# Patient Record
Sex: FEMALE | Race: WHITE | NOT HISPANIC OR LATINO
[De-identification: names, ages, dates, MRNs, and addresses within clinical notes are randomized per-mention and may not be internally consistent; named-entity substitution may affect disease eponyms.]

---

## 2017-02-07 ENCOUNTER — APPOINTMENT (OUTPATIENT)
Dept: ORTHOPEDIC SURGERY | Facility: CLINIC | Age: 62
End: 2017-02-07

## 2017-02-07 VITALS
SYSTOLIC BLOOD PRESSURE: 142 MMHG | HEIGHT: 62 IN | WEIGHT: 250 LBS | BODY MASS INDEX: 46.01 KG/M2 | HEART RATE: 75 BPM | DIASTOLIC BLOOD PRESSURE: 80 MMHG

## 2017-02-07 DIAGNOSIS — M72.2 PLANTAR FASCIAL FIBROMATOSIS: ICD-10-CM

## 2017-02-07 DIAGNOSIS — M76.829 POSTERIOR TIBIAL TENDINITIS, UNSPECIFIED LEG: ICD-10-CM

## 2019-12-31 ENCOUNTER — APPOINTMENT (OUTPATIENT)
Dept: ORTHOPEDIC SURGERY | Facility: CLINIC | Age: 64
End: 2019-12-31
Payer: COMMERCIAL

## 2019-12-31 VITALS
HEIGHT: 62 IN | BODY MASS INDEX: 53.73 KG/M2 | SYSTOLIC BLOOD PRESSURE: 143 MMHG | WEIGHT: 292 LBS | HEART RATE: 78 BPM | DIASTOLIC BLOOD PRESSURE: 74 MMHG

## 2019-12-31 DIAGNOSIS — M16.12 UNILATERAL PRIMARY OSTEOARTHRITIS, LEFT HIP: ICD-10-CM

## 2019-12-31 DIAGNOSIS — M16.11 UNILATERAL PRIMARY OSTEOARTHRITIS, RIGHT HIP: ICD-10-CM

## 2019-12-31 DIAGNOSIS — I87.2 VENOUS INSUFFICIENCY (CHRONIC) (PERIPHERAL): ICD-10-CM

## 2019-12-31 DIAGNOSIS — M25.552 PAIN IN LEFT HIP: ICD-10-CM

## 2019-12-31 PROCEDURE — 73521 X-RAY EXAM HIPS BI 2 VIEWS: CPT

## 2019-12-31 PROCEDURE — 99214 OFFICE O/P EST MOD 30 MIN: CPT

## 2019-12-31 RX ORDER — CALCIUM CARBONATE/VITAMIN D3 600 MG-10
TABLET ORAL
Refills: 0 | Status: ACTIVE | COMMUNITY

## 2019-12-31 RX ORDER — AMLODIPINE BESYLATE 5 MG/1
TABLET ORAL
Refills: 0 | Status: ACTIVE | COMMUNITY

## 2019-12-31 RX ORDER — CHROMIUM 200 MCG
TABLET ORAL
Refills: 0 | Status: ACTIVE | COMMUNITY

## 2020-01-21 ENCOUNTER — OUTPATIENT (OUTPATIENT)
Dept: OUTPATIENT SERVICES | Facility: HOSPITAL | Age: 65
LOS: 1 days | Discharge: HOME | End: 2020-01-21
Payer: COMMERCIAL

## 2020-01-21 ENCOUNTER — OUTPATIENT (OUTPATIENT)
Dept: OUTPATIENT SERVICES | Facility: HOSPITAL | Age: 65
LOS: 1 days | End: 2020-01-21
Payer: COMMERCIAL

## 2020-01-21 VITALS
SYSTOLIC BLOOD PRESSURE: 130 MMHG | DIASTOLIC BLOOD PRESSURE: 70 MMHG | HEART RATE: 58 BPM | WEIGHT: 283.96 LBS | OXYGEN SATURATION: 99 % | RESPIRATION RATE: 14 BRPM | TEMPERATURE: 98 F | HEIGHT: 62 IN

## 2020-01-21 DIAGNOSIS — D24.2 BENIGN NEOPLASM OF LEFT BREAST: Chronic | ICD-10-CM

## 2020-01-21 DIAGNOSIS — Z98.890 OTHER SPECIFIED POSTPROCEDURAL STATES: Chronic | ICD-10-CM

## 2020-01-21 DIAGNOSIS — K11.6 MUCOCELE OF SALIVARY GLAND: Chronic | ICD-10-CM

## 2020-01-21 DIAGNOSIS — M19.90 UNSPECIFIED OSTEOARTHRITIS, UNSPECIFIED SITE: ICD-10-CM

## 2020-01-21 DIAGNOSIS — M16.12 UNILATERAL PRIMARY OSTEOARTHRITIS, LEFT HIP: ICD-10-CM

## 2020-01-21 DIAGNOSIS — Z90.710 ACQUIRED ABSENCE OF BOTH CERVIX AND UTERUS: Chronic | ICD-10-CM

## 2020-01-21 DIAGNOSIS — Z01.818 ENCOUNTER FOR OTHER PREPROCEDURAL EXAMINATION: ICD-10-CM

## 2020-01-21 DIAGNOSIS — S86.119A STRAIN OF OTHER MUSCLE(S) AND TENDON(S) OF POSTERIOR MUSCLE GROUP AT LOWER LEG LEVEL, UNSPECIFIED LEG, INITIAL ENCOUNTER: Chronic | ICD-10-CM

## 2020-01-21 LAB
ALBUMIN SERPL ELPH-MCNC: 3.8 G/DL — SIGNIFICANT CHANGE UP (ref 3.3–5)
ALP SERPL-CCNC: 125 U/L — HIGH (ref 30–120)
ALT FLD-CCNC: 85 U/L DA — HIGH (ref 10–60)
ANION GAP SERPL CALC-SCNC: 4 MMOL/L — LOW (ref 5–17)
APTT BLD: 32.4 SEC — SIGNIFICANT CHANGE UP (ref 28.5–37)
AST SERPL-CCNC: 38 U/L — SIGNIFICANT CHANGE UP (ref 10–40)
BILIRUB SERPL-MCNC: 0.6 MG/DL — SIGNIFICANT CHANGE UP (ref 0.2–1.2)
BLD GP AB SCN SERPL QL: SIGNIFICANT CHANGE UP
BUN SERPL-MCNC: 17 MG/DL — SIGNIFICANT CHANGE UP (ref 7–23)
CALCIUM SERPL-MCNC: 11 MG/DL — HIGH (ref 8.4–10.5)
CHLORIDE SERPL-SCNC: 105 MMOL/L — SIGNIFICANT CHANGE UP (ref 96–108)
CO2 SERPL-SCNC: 32 MMOL/L — HIGH (ref 22–31)
CREAT SERPL-MCNC: 0.75 MG/DL — SIGNIFICANT CHANGE UP (ref 0.5–1.3)
GLUCOSE SERPL-MCNC: 107 MG/DL — HIGH (ref 70–99)
HCT VFR BLD CALC: 42.3 % — SIGNIFICANT CHANGE UP (ref 34.5–45)
HGB BLD-MCNC: 14.1 G/DL — SIGNIFICANT CHANGE UP (ref 11.5–15.5)
INR BLD: 1.1 RATIO — SIGNIFICANT CHANGE UP (ref 0.88–1.16)
MCHC RBC-ENTMCNC: 29.8 PG — SIGNIFICANT CHANGE UP (ref 27–34)
MCHC RBC-ENTMCNC: 33.3 GM/DL — SIGNIFICANT CHANGE UP (ref 32–36)
MCV RBC AUTO: 89.4 FL — SIGNIFICANT CHANGE UP (ref 80–100)
MRSA PCR RESULT.: SIGNIFICANT CHANGE UP
NRBC # BLD: 0 /100 WBCS — SIGNIFICANT CHANGE UP (ref 0–0)
PLATELET # BLD AUTO: 366 K/UL — SIGNIFICANT CHANGE UP (ref 150–400)
POTASSIUM SERPL-MCNC: 4.1 MMOL/L — SIGNIFICANT CHANGE UP (ref 3.5–5.3)
POTASSIUM SERPL-SCNC: 4.1 MMOL/L — SIGNIFICANT CHANGE UP (ref 3.5–5.3)
PROT SERPL-MCNC: 7.6 G/DL — SIGNIFICANT CHANGE UP (ref 6–8.3)
PROTHROM AB SERPL-ACNC: 12.1 SEC — SIGNIFICANT CHANGE UP (ref 10–12.9)
RBC # BLD: 4.73 M/UL — SIGNIFICANT CHANGE UP (ref 3.8–5.2)
RBC # FLD: 13 % — SIGNIFICANT CHANGE UP (ref 10.3–14.5)
S AUREUS DNA NOSE QL NAA+PROBE: SIGNIFICANT CHANGE UP
SODIUM SERPL-SCNC: 141 MMOL/L — SIGNIFICANT CHANGE UP (ref 135–145)
WBC # BLD: 9.08 K/UL — SIGNIFICANT CHANGE UP (ref 3.8–10.5)
WBC # FLD AUTO: 9.08 K/UL — SIGNIFICANT CHANGE UP (ref 3.8–10.5)

## 2020-01-21 PROCEDURE — 77262 THER RADIOLOGY TX PLNG INTRM: CPT

## 2020-01-21 PROCEDURE — G0463: CPT

## 2020-01-21 RX ORDER — IRBESARTAN 75 MG/1
1 TABLET ORAL
Qty: 0 | Refills: 0 | DISCHARGE

## 2020-01-21 NOTE — H&P PST ADULT - HISTORY OF PRESENT ILLNESS
This is a 65 y/o female who presents with 5 year history of progressively worsening bilateral hip pain, clicking , snapping and difficulty ambulation worse in left hip . Reports constant pain and worse pain with walking , and activities pain scale 9/10 ,Ambulates with walker for the last t 2 weeks . MRI was done which showed severe OA in both hips . scheduled for left hip replacement anterior approach on 2/5/20

## 2020-01-21 NOTE — H&P PST ADULT - NSANTHOSAYNRD_GEN_A_CORE
No. JOSE RAUL screening performed.  STOP BANG Legend: 0-2 = LOW Risk; 3-4 = INTERMEDIATE Risk; 5-8 = HIGH Risk

## 2020-01-21 NOTE — H&P PST ADULT - NSICDXPASTSURGICALHX_GEN_ALL_CORE_FT
PAST SURGICAL HISTORY:  Adenoma of left breast excision 1997    Cyst of salivary gland excision 1989    History of pterygium excision left eye 2005    S/P ankle ligament repair left ankle 1985    S/P carpal tunnel release right 2009    S/P excision of neuroma left ankle 1988    S/P hysterectomy 2002    Status post trigger finger release right thumb and middle finger 2011    Tibialis posterior tendon rupture repair and foot surgery  left 2012

## 2020-01-21 NOTE — H&P PST ADULT - NSICDXPASTMEDICALHX_GEN_ALL_CORE_FT
PAST MEDICAL HISTORY:  Allergic rhinitis     Chronic venous insufficiency     GERD (gastroesophageal reflux disease)     Glaucoma     Hashimoto's thyroiditis     HTN (hypertension)     Hyperparathyroidism being monitored by endocrinologist    Migraines     Morbid obesity with BMI of 50.0-59.9, adult BMI 51.9    Osteoarthritis, hip, bilateral     Raynauds disease     Rosacea     Vertigo

## 2020-01-21 NOTE — H&P PST ADULT - DOCUMENT STATUS
Calorie Counts  Intake recorded for: 6/15                  Kcals: 0                       Protein: 0g  # Meals Recorded: no meal ordered from kitchen, no food intake recorded   # Supplements Recorded: none recorded      Authored by Resident/PA/NP

## 2020-01-21 NOTE — H&P PST ADULT - NSICDXFAMILYHX_GEN_ALL_CORE_FT
FAMILY HISTORY:  Family history of breast cancer in sister, HTN  Family history of colon cancer in father, prostate cancer , HTN  Family history of early CAD, CABG father  Family history of pheochromocytoma, sister  FH: HTN (hypertension), sisters  FH: HTN (hypertension), TIA; Mother

## 2020-01-21 NOTE — H&P PST ADULT - RS GEN PE MLT RESP DETAILS PC
respirations non-labored/no rhonchi/no subcutaneous emphysema/clear to auscultation bilaterally/no wheezes/no rales

## 2020-01-21 NOTE — H&P PST ADULT - NSICDXPROBLEM_GEN_ALL_CORE_FT
PROBLEM DIAGNOSES  Problem: Osteoarthritis  Assessment and Plan: left hip replacement   Medical , cardiac , vascular and endocrine clearance   Pre op instructions   Pharmacy consult

## 2020-01-22 ENCOUNTER — APPOINTMENT (OUTPATIENT)
Dept: RADIATION ONCOLOGY | Facility: CLINIC | Age: 65
End: 2020-01-22
Payer: COMMERCIAL

## 2020-01-22 PROBLEM — G43.909 MIGRAINE, UNSPECIFIED, NOT INTRACTABLE, WITHOUT STATUS MIGRAINOSUS: Chronic | Status: ACTIVE | Noted: 2020-01-21

## 2020-01-22 PROBLEM — I10 ESSENTIAL (PRIMARY) HYPERTENSION: Chronic | Status: ACTIVE | Noted: 2020-01-21

## 2020-01-22 PROBLEM — L71.9 ROSACEA, UNSPECIFIED: Chronic | Status: ACTIVE | Noted: 2020-01-21

## 2020-01-22 PROBLEM — J30.9 ALLERGIC RHINITIS, UNSPECIFIED: Chronic | Status: ACTIVE | Noted: 2020-01-21

## 2020-01-22 PROBLEM — I87.2 VENOUS INSUFFICIENCY (CHRONIC) (PERIPHERAL): Chronic | Status: ACTIVE | Noted: 2020-01-21

## 2020-01-22 PROBLEM — K21.9 GASTRO-ESOPHAGEAL REFLUX DISEASE WITHOUT ESOPHAGITIS: Chronic | Status: ACTIVE | Noted: 2020-01-21

## 2020-01-22 PROBLEM — E21.3 HYPERPARATHYROIDISM, UNSPECIFIED: Chronic | Status: ACTIVE | Noted: 2020-01-21

## 2020-01-22 PROBLEM — H40.9 UNSPECIFIED GLAUCOMA: Chronic | Status: ACTIVE | Noted: 2020-01-21

## 2020-01-22 PROBLEM — I73.00 RAYNAUD'S SYNDROME WITHOUT GANGRENE: Chronic | Status: ACTIVE | Noted: 2020-01-21

## 2020-01-22 PROBLEM — E06.3 AUTOIMMUNE THYROIDITIS: Chronic | Status: ACTIVE | Noted: 2020-01-21

## 2020-01-22 PROBLEM — R42 DIZZINESS AND GIDDINESS: Chronic | Status: ACTIVE | Noted: 2020-01-21

## 2020-01-22 PROCEDURE — 99202 OFFICE O/P NEW SF 15 MIN: CPT | Mod: 25

## 2020-01-22 NOTE — REASON FOR VISIT
[Consideration of Therapy for Benign Disease] : consideration of therapy for benign disease [Other: ___] : [unfilled]

## 2020-01-22 NOTE — PHYSICAL EXAM
[Obese] : obese [Normal] : normal heart rate and rhythm, normal S1 and S2, and no murmurs present [de-identified] : walker-dependent [de-identified] : unable to walk with indepenednet weight-bearing, uses 4-point walker- no muscle atrophy, good strength ,marked limitation ROM both hip/thighs

## 2020-01-22 NOTE — HISTORY OF PRESENT ILLNESS
[FreeTextEntry1] : Gretchen Hatfield is a  62 y/o Female who presents prior to planned left THR with Dr. Harris for worsening Bilateral buttock & groin hip pain with walking & daily activities, worsening over 2+ years.  The left hip is worse than the right with imaging demonstrating bone on bone arthritis. \par Imaging: Radiographs of the pelvis and [LEft] hip were performed today in the [place] office.\par Grade 4 bone-on-bone cartilaginous wear in the F- A joint.\par Subchondral sclerosis noted on both sides the F-A joint. AVN with superior collapse of the Left femoral head.\par Superior and inferior rim osteophytes are noted.\par The [ Left ] lesser trochanter aligns with the [ right ] on Shenton's line.\par There is disuse osteopenia of the [ Right ] proximal femur.\par \par MRI report Left Hip 12/17/19  Radiology:\par Advanced right and left hip osteoarthritis with bone-on-bone apposition, morbid joint effusion and synovitis, progressed since September 2019, avascular necrosis of the left femoral head, subchondral sclerosis and cysts.\par She has established severe DJD of both hips. Currently, Left side more painful than Right, 10/10 when standing. She had Work-up for Lower back & bilat. buttock pain with walking, limp, & ADLs, Had Spine W/U Raphael Geronimo MD including LSS MRI, had OA on X-Ray 2 Y ago. Presents with X-ray images & reports for Pelvis,Hip X-ray [facility] Radiology 9/17/2019. Also presents with Left hip MRI report and images  Radiology 12/17/19. She had course of bilat. hip steroid injections early Nov 2019 & 2nd course steroid injections both hips Z Radiology End Mod Nov 2019, both with min - no relief. Had short course OP PT 2017 & 2019 with no relief and unable to tolerate.\par \par Currently walking with cane for more than 15 yrs. Taking Advil 2x/day x 2 months. Had short course Tramadol from Spine MD ended Nov 2019.\par States had Rx NSAID 4 weeks for hip pain 2017 had urinary bleeding and stopped NSAID. Told no NSAID per Urologist at [facility]. Left hip replacement is scheduled for 2/5/2020.\par

## 2020-01-29 NOTE — PHARMACOTHERAPY INTERVENTION NOTE - COMMENTS
Presurgical evaluation for postoperative medication management. Team emailed. Note placed in [place].

## 2020-01-29 NOTE — PROGRESS NOTE ADULT - ASSESSMENT
Presurgical evaluation:  1.	IV TXA  2.	Note allergy to adhesive tape  3.	Acetaminophen for pain management. Please repeat LFTs POD1.  4.	Patient would like to take Celecoxib 100mg q12h x 2-3 weeks only for multimodal pain management.  5.	Patient receiving preop RT for HO prevention  6.	VTE prophylaxis per Caprini score  7.	Nutrition copied for food allergies

## 2020-01-29 NOTE — PROGRESS NOTE ADULT - SUBJECTIVE AND OBJECTIVE BOX
Admission medication reconciliation/Presurgical evaluation:    Allergies:  Cardizem: Rash  Levaquin: Rash  fish: Rash (halibut,sole, lobster)  adhesives: Rash  Flonase: face swelling  NSAIDs: hematuria with chronic use    Home Medications:   · 	Norvasc 5 mg once a day  · 	Toprol- mg 2 times a day AM&PM  · 	latanoprost 0.005% 1 drop to each affected eye once a day (in the evening)  · 	Pataday 0.2% 1 drop to each affected eye once a day, As Needed  · 	Synthroid 50 mcg once a day for 6  days   · 	Synthroid 100 mcg once a week 7th day   · 	Pepcid 20 mg once a day QPM  · 	ProAir HFA 90 mcg/inh 2 puffs inhaled 4 times a day, As Needed  · 	ZyrTEC 10 mg once a day  · 	Avapro 300 mg once a day    PAST MEDICAL HISTORY:  Allergic rhinitis   Chronic venous insufficiency   GERD (gastroesophageal reflux disease)   Glaucoma   Hashimoto's thyroiditis   HTN (hypertension)   Hyperparathyroidism being monitored by endocrinologist  Migraines   Morbid obesity with BMI of 50.0-59.9, adult BMI 51.9  Osteoarthritis, hip, bilateral   Raynauds disease   Rosacea   Vertigo     PAST SURGICAL HISTORY:  Adenoma of left breast excision 1997  Cyst of salivary gland excision 1989  History of pterygium excision left eye 2005  S/P ankle ligament repair left ankle 1985  S/P carpal tunnel release right 2009  S/P excision of neuroma left ankle 1988  S/P hysterectomy 2002  Status post trigger finger release right thumb and middle finger 2011  Tibialis posterior tendon rupture repair and foot surgery  left 2012    PST values of interest:  CO2 32 (­)  Ca 11.0 (­)  Alk phos 125 (­)  ALT 85 (­)  AST 38 (WNL)  SCr 0.75 mg/dL (WNL)

## 2020-02-04 PROCEDURE — 77300 RADIATION THERAPY DOSE PLAN: CPT | Mod: 26

## 2020-02-04 PROCEDURE — 77431 RADIATION THERAPY MANAGEMENT: CPT

## 2020-02-04 PROCEDURE — 77280 THER RAD SIMULAJ FIELD SMPL: CPT | Mod: 26

## 2020-02-05 ENCOUNTER — INPATIENT (INPATIENT)
Facility: HOSPITAL | Age: 65
LOS: 4 days | Discharge: HOME | DRG: 470 | End: 2020-02-10
Attending: ORTHOPAEDIC SURGERY | Admitting: ORTHOPAEDIC SURGERY
Payer: COMMERCIAL

## 2020-02-05 ENCOUNTER — APPOINTMENT (OUTPATIENT)
Dept: ORTHOPEDIC SURGERY | Facility: HOSPITAL | Age: 65
End: 2020-02-05

## 2020-02-05 ENCOUNTER — TRANSCRIPTION ENCOUNTER (OUTPATIENT)
Age: 65
End: 2020-02-05

## 2020-02-05 ENCOUNTER — RESULT REVIEW (OUTPATIENT)
Age: 65
End: 2020-02-05

## 2020-02-05 VITALS
SYSTOLIC BLOOD PRESSURE: 161 MMHG | TEMPERATURE: 99 F | RESPIRATION RATE: 18 BRPM | WEIGHT: 283.73 LBS | HEIGHT: 61.5 IN | HEART RATE: 76 BPM | OXYGEN SATURATION: 95 % | DIASTOLIC BLOOD PRESSURE: 78 MMHG

## 2020-02-05 DIAGNOSIS — S86.119A STRAIN OF OTHER MUSCLE(S) AND TENDON(S) OF POSTERIOR MUSCLE GROUP AT LOWER LEG LEVEL, UNSPECIFIED LEG, INITIAL ENCOUNTER: Chronic | ICD-10-CM

## 2020-02-05 DIAGNOSIS — Z98.890 OTHER SPECIFIED POSTPROCEDURAL STATES: Chronic | ICD-10-CM

## 2020-02-05 DIAGNOSIS — E66.01 MORBID (SEVERE) OBESITY DUE TO EXCESS CALORIES: ICD-10-CM

## 2020-02-05 DIAGNOSIS — Z90.710 ACQUIRED ABSENCE OF BOTH CERVIX AND UTERUS: Chronic | ICD-10-CM

## 2020-02-05 DIAGNOSIS — M16.12 UNILATERAL PRIMARY OSTEOARTHRITIS, LEFT HIP: ICD-10-CM

## 2020-02-05 DIAGNOSIS — D24.2 BENIGN NEOPLASM OF LEFT BREAST: Chronic | ICD-10-CM

## 2020-02-05 DIAGNOSIS — K11.6 MUCOCELE OF SALIVARY GLAND: Chronic | ICD-10-CM

## 2020-02-05 DIAGNOSIS — Z01.818 ENCOUNTER FOR OTHER PREPROCEDURAL EXAMINATION: ICD-10-CM

## 2020-02-05 LAB
ANION GAP SERPL CALC-SCNC: 7 MMOL/L — SIGNIFICANT CHANGE UP (ref 5–17)
BUN SERPL-MCNC: 13 MG/DL — SIGNIFICANT CHANGE UP (ref 7–23)
CALCIUM SERPL-MCNC: 10.5 MG/DL — SIGNIFICANT CHANGE UP (ref 8.4–10.5)
CHLORIDE SERPL-SCNC: 103 MMOL/L — SIGNIFICANT CHANGE UP (ref 96–108)
CO2 SERPL-SCNC: 28 MMOL/L — SIGNIFICANT CHANGE UP (ref 22–31)
CREAT SERPL-MCNC: 0.67 MG/DL — SIGNIFICANT CHANGE UP (ref 0.5–1.3)
GLUCOSE SERPL-MCNC: 159 MG/DL — HIGH (ref 70–99)
HCT VFR BLD CALC: 36.4 % — SIGNIFICANT CHANGE UP (ref 34.5–45)
HGB BLD-MCNC: 12.3 G/DL — SIGNIFICANT CHANGE UP (ref 11.5–15.5)
POTASSIUM SERPL-MCNC: 4.4 MMOL/L — SIGNIFICANT CHANGE UP (ref 3.5–5.3)
POTASSIUM SERPL-SCNC: 4.4 MMOL/L — SIGNIFICANT CHANGE UP (ref 3.5–5.3)
SODIUM SERPL-SCNC: 138 MMOL/L — SIGNIFICANT CHANGE UP (ref 135–145)

## 2020-02-05 PROCEDURE — 99223 1ST HOSP IP/OBS HIGH 75: CPT

## 2020-02-05 PROCEDURE — 88311 DECALCIFY TISSUE: CPT | Mod: 26

## 2020-02-05 PROCEDURE — 27130 TOTAL HIP ARTHROPLASTY: CPT | Mod: LT

## 2020-02-05 PROCEDURE — 88305 TISSUE EXAM BY PATHOLOGIST: CPT | Mod: 26

## 2020-02-05 RX ORDER — HYDROMORPHONE HYDROCHLORIDE 2 MG/ML
0.5 INJECTION INTRAMUSCULAR; INTRAVENOUS; SUBCUTANEOUS
Refills: 0 | Status: DISCONTINUED | OUTPATIENT
Start: 2020-02-05 | End: 2020-02-10

## 2020-02-05 RX ORDER — ALBUTEROL 90 UG/1
2 AEROSOL, METERED ORAL EVERY 4 HOURS
Refills: 0 | Status: DISCONTINUED | OUTPATIENT
Start: 2020-02-05 | End: 2020-02-10

## 2020-02-05 RX ORDER — TRANEXAMIC ACID 100 MG/ML
1000 INJECTION, SOLUTION INTRAVENOUS ONCE
Refills: 0 | Status: COMPLETED | OUTPATIENT
Start: 2020-02-05 | End: 2020-02-05

## 2020-02-05 RX ORDER — AMLODIPINE BESYLATE 2.5 MG/1
5 TABLET ORAL DAILY
Refills: 0 | Status: DISCONTINUED | OUTPATIENT
Start: 2020-02-07 | End: 2020-02-10

## 2020-02-05 RX ORDER — CELECOXIB 200 MG/1
1 CAPSULE ORAL
Qty: 60 | Refills: 0
Start: 2020-02-05 | End: 2020-03-05

## 2020-02-05 RX ORDER — PANTOPRAZOLE SODIUM 20 MG/1
1 TABLET, DELAYED RELEASE ORAL
Qty: 30 | Refills: 1
Start: 2020-02-05 | End: 2020-04-04

## 2020-02-05 RX ORDER — ACETAMINOPHEN 500 MG
1000 TABLET ORAL ONCE
Refills: 0 | Status: COMPLETED | OUTPATIENT
Start: 2020-02-05 | End: 2020-02-05

## 2020-02-05 RX ORDER — LEVOTHYROXINE SODIUM 125 MCG
50 TABLET ORAL
Refills: 0 | Status: DISCONTINUED | OUTPATIENT
Start: 2020-02-05 | End: 2020-02-10

## 2020-02-05 RX ORDER — LORATADINE 10 MG/1
10 TABLET ORAL DAILY
Refills: 0 | Status: DISCONTINUED | OUTPATIENT
Start: 2020-02-05 | End: 2020-02-10

## 2020-02-05 RX ORDER — APIXABAN 2.5 MG/1
2.5 TABLET, FILM COATED ORAL EVERY 12 HOURS
Refills: 0 | Status: DISCONTINUED | OUTPATIENT
Start: 2020-02-06 | End: 2020-02-10

## 2020-02-05 RX ORDER — OXYCODONE HYDROCHLORIDE 5 MG/1
10 TABLET ORAL
Refills: 0 | Status: DISCONTINUED | OUTPATIENT
Start: 2020-02-05 | End: 2020-02-10

## 2020-02-05 RX ORDER — SENNA PLUS 8.6 MG/1
2 TABLET ORAL AT BEDTIME
Refills: 0 | Status: DISCONTINUED | OUTPATIENT
Start: 2020-02-05 | End: 2020-02-10

## 2020-02-05 RX ORDER — CEFAZOLIN SODIUM 1 G
3000 VIAL (EA) INJECTION EVERY 8 HOURS
Refills: 0 | Status: COMPLETED | OUTPATIENT
Start: 2020-02-05 | End: 2020-02-06

## 2020-02-05 RX ORDER — ACETAMINOPHEN 500 MG
1000 TABLET ORAL EVERY 6 HOURS
Refills: 0 | Status: COMPLETED | OUTPATIENT
Start: 2020-02-05 | End: 2020-02-06

## 2020-02-05 RX ORDER — SODIUM CHLORIDE 9 MG/ML
1000 INJECTION, SOLUTION INTRAVENOUS
Refills: 0 | Status: DISCONTINUED | OUTPATIENT
Start: 2020-02-05 | End: 2020-02-05

## 2020-02-05 RX ORDER — APIXABAN 2.5 MG/1
1 TABLET, FILM COATED ORAL
Qty: 7 | Refills: 0
Start: 2020-02-05 | End: 2020-02-08

## 2020-02-05 RX ORDER — ACETAMINOPHEN 500 MG
1000 TABLET ORAL EVERY 8 HOURS
Refills: 0 | Status: DISCONTINUED | OUTPATIENT
Start: 2020-02-06 | End: 2020-02-10

## 2020-02-05 RX ORDER — POLYETHYLENE GLYCOL 3350 17 G/17G
17 POWDER, FOR SOLUTION ORAL
Qty: 0 | Refills: 0 | DISCHARGE
Start: 2020-02-05

## 2020-02-05 RX ORDER — LORATADINE 10 MG/1
1 TABLET ORAL
Qty: 0 | Refills: 0 | DISCHARGE
Start: 2020-02-05

## 2020-02-05 RX ORDER — ONDANSETRON 8 MG/1
4 TABLET, FILM COATED ORAL EVERY 6 HOURS
Refills: 0 | Status: DISCONTINUED | OUTPATIENT
Start: 2020-02-05 | End: 2020-02-10

## 2020-02-05 RX ORDER — APREPITANT 80 MG/1
40 CAPSULE ORAL ONCE
Refills: 0 | Status: COMPLETED | OUTPATIENT
Start: 2020-02-05 | End: 2020-02-05

## 2020-02-05 RX ORDER — CELECOXIB 200 MG/1
100 CAPSULE ORAL EVERY 12 HOURS
Refills: 0 | Status: DISCONTINUED | OUTPATIENT
Start: 2020-02-05 | End: 2020-02-10

## 2020-02-05 RX ORDER — LEVOTHYROXINE SODIUM 125 MCG
100 TABLET ORAL
Refills: 0 | Status: DISCONTINUED | OUTPATIENT
Start: 2020-02-05 | End: 2020-02-10

## 2020-02-05 RX ORDER — CHLORHEXIDINE GLUCONATE 213 G/1000ML
1 SOLUTION TOPICAL ONCE
Refills: 0 | Status: COMPLETED | OUTPATIENT
Start: 2020-02-05 | End: 2020-02-05

## 2020-02-05 RX ORDER — ONDANSETRON 8 MG/1
4 TABLET, FILM COATED ORAL ONCE
Refills: 0 | Status: COMPLETED | OUTPATIENT
Start: 2020-02-05 | End: 2020-02-05

## 2020-02-05 RX ORDER — ACETAMINOPHEN 500 MG
2 TABLET ORAL
Qty: 0 | Refills: 0 | DISCHARGE
Start: 2020-02-05 | End: 2020-02-19

## 2020-02-05 RX ORDER — MAGNESIUM HYDROXIDE 400 MG/1
30 TABLET, CHEWABLE ORAL DAILY
Refills: 0 | Status: DISCONTINUED | OUTPATIENT
Start: 2020-02-05 | End: 2020-02-10

## 2020-02-05 RX ORDER — SODIUM CHLORIDE 9 MG/ML
1000 INJECTION, SOLUTION INTRAVENOUS
Refills: 0 | Status: DISCONTINUED | OUTPATIENT
Start: 2020-02-05 | End: 2020-02-08

## 2020-02-05 RX ORDER — LATANOPROST 0.05 MG/ML
1 SOLUTION/ DROPS OPHTHALMIC; TOPICAL AT BEDTIME
Refills: 0 | Status: DISCONTINUED | OUTPATIENT
Start: 2020-02-05 | End: 2020-02-10

## 2020-02-05 RX ORDER — METOPROLOL TARTRATE 50 MG
100 TABLET ORAL EVERY 12 HOURS
Refills: 0 | Status: DISCONTINUED | OUTPATIENT
Start: 2020-02-05 | End: 2020-02-10

## 2020-02-05 RX ORDER — SENNA PLUS 8.6 MG/1
2 TABLET ORAL
Qty: 0 | Refills: 0 | DISCHARGE
Start: 2020-02-05

## 2020-02-05 RX ORDER — POLYETHYLENE GLYCOL 3350 17 G/17G
17 POWDER, FOR SOLUTION ORAL DAILY
Refills: 0 | Status: DISCONTINUED | OUTPATIENT
Start: 2020-02-05 | End: 2020-02-10

## 2020-02-05 RX ORDER — LOSARTAN POTASSIUM 100 MG/1
100 TABLET, FILM COATED ORAL DAILY
Refills: 0 | Status: DISCONTINUED | OUTPATIENT
Start: 2020-02-07 | End: 2020-02-10

## 2020-02-05 RX ORDER — HYDROMORPHONE HYDROCHLORIDE 2 MG/ML
0.5 INJECTION INTRAMUSCULAR; INTRAVENOUS; SUBCUTANEOUS
Refills: 0 | Status: DISCONTINUED | OUTPATIENT
Start: 2020-02-05 | End: 2020-02-05

## 2020-02-05 RX ORDER — CEFAZOLIN SODIUM 1 G
3000 VIAL (EA) INJECTION ONCE
Refills: 0 | Status: COMPLETED | OUTPATIENT
Start: 2020-02-05 | End: 2020-02-05

## 2020-02-05 RX ORDER — OLOPATADINE HYDROCHLORIDE 1 MG/ML
1 SOLUTION/ DROPS OPHTHALMIC
Qty: 0 | Refills: 0 | DISCHARGE

## 2020-02-05 RX ORDER — APIXABAN 2.5 MG/1
1 TABLET, FILM COATED ORAL
Qty: 60 | Refills: 0
Start: 2020-02-05 | End: 2020-03-05

## 2020-02-05 RX ORDER — PANTOPRAZOLE SODIUM 20 MG/1
40 TABLET, DELAYED RELEASE ORAL
Refills: 0 | Status: DISCONTINUED | OUTPATIENT
Start: 2020-02-05 | End: 2020-02-10

## 2020-02-05 RX ORDER — OXYCODONE HYDROCHLORIDE 5 MG/1
5 TABLET ORAL
Refills: 0 | Status: DISCONTINUED | OUTPATIENT
Start: 2020-02-05 | End: 2020-02-10

## 2020-02-05 RX ADMIN — HYDROMORPHONE HYDROCHLORIDE 0.5 MILLIGRAM(S): 2 INJECTION INTRAMUSCULAR; INTRAVENOUS; SUBCUTANEOUS at 13:00

## 2020-02-05 RX ADMIN — SENNA PLUS 2 TABLET(S): 8.6 TABLET ORAL at 21:21

## 2020-02-05 RX ADMIN — CELECOXIB 100 MILLIGRAM(S): 200 CAPSULE ORAL at 21:21

## 2020-02-05 RX ADMIN — HYDROMORPHONE HYDROCHLORIDE 0.5 MILLIGRAM(S): 2 INJECTION INTRAMUSCULAR; INTRAVENOUS; SUBCUTANEOUS at 14:59

## 2020-02-05 RX ADMIN — HYDROMORPHONE HYDROCHLORIDE 0.5 MILLIGRAM(S): 2 INJECTION INTRAMUSCULAR; INTRAVENOUS; SUBCUTANEOUS at 11:41

## 2020-02-05 RX ADMIN — APREPITANT 40 MILLIGRAM(S): 80 CAPSULE ORAL at 07:17

## 2020-02-05 RX ADMIN — Medication 400 MILLIGRAM(S): at 15:54

## 2020-02-05 RX ADMIN — HYDROMORPHONE HYDROCHLORIDE 0.5 MILLIGRAM(S): 2 INJECTION INTRAMUSCULAR; INTRAVENOUS; SUBCUTANEOUS at 15:15

## 2020-02-05 RX ADMIN — CHLORHEXIDINE GLUCONATE 1 APPLICATION(S): 213 SOLUTION TOPICAL at 07:16

## 2020-02-05 RX ADMIN — Medication 1000 MILLIGRAM(S): at 16:30

## 2020-02-05 RX ADMIN — HYDROMORPHONE HYDROCHLORIDE 0.5 MILLIGRAM(S): 2 INJECTION INTRAMUSCULAR; INTRAVENOUS; SUBCUTANEOUS at 13:15

## 2020-02-05 RX ADMIN — ONDANSETRON 4 MILLIGRAM(S): 8 TABLET, FILM COATED ORAL at 12:00

## 2020-02-05 RX ADMIN — Medication 1000 MILLIGRAM(S): at 21:32

## 2020-02-05 RX ADMIN — Medication 400 MILLIGRAM(S): at 21:21

## 2020-02-05 RX ADMIN — HYDROMORPHONE HYDROCHLORIDE 0.5 MILLIGRAM(S): 2 INJECTION INTRAMUSCULAR; INTRAVENOUS; SUBCUTANEOUS at 11:55

## 2020-02-05 RX ADMIN — Medication 200 MILLIGRAM(S): at 17:48

## 2020-02-05 RX ADMIN — SODIUM CHLORIDE 100 MILLILITER(S): 9 INJECTION, SOLUTION INTRAVENOUS at 11:30

## 2020-02-05 RX ADMIN — CELECOXIB 100 MILLIGRAM(S): 200 CAPSULE ORAL at 21:32

## 2020-02-05 RX ADMIN — SODIUM CHLORIDE 100 MILLILITER(S): 9 INJECTION, SOLUTION INTRAVENOUS at 15:54

## 2020-02-05 RX ADMIN — LATANOPROST 1 DROP(S): 0.05 SOLUTION/ DROPS OPHTHALMIC; TOPICAL at 21:22

## 2020-02-05 NOTE — BRIEF OPERATIVE NOTE - NSICDXBRIEFPOSTOP_GEN_ALL_CORE_FT
POST-OP DIAGNOSIS:  Primary localized osteoarthritis of left hip 05-Feb-2020 10:43:06  Gurjit Mccormick

## 2020-02-05 NOTE — DISCHARGE NOTE PROVIDER - INSTRUCTIONS
DASH diet  Pain medicine has been prescribed for you, as needed, and it often causes constipation.  Take docusate sodium (Colace) 100mg 3x daily, while taking narcotic pain medication.   For Constipation :   • Increase your water intake. Drink at least 8 glasses of water daily.  • Try adding fiber to your diet by eating fruits, vegetables and foods that are rich in grains.  • If you do experience constipation, you may take an over-the-counter laxative such as, Senokot, Miralax or  Milk of Magnesia.

## 2020-02-05 NOTE — OCCUPATIONAL THERAPY INITIAL EVALUATION ADULT - PERSONAL SAFETY AND JUDGMENT, REHAB EVAL
Obtained ABG and gave results to Dr. Cristi Barrios. No changes at this time. Will continue to monitor. intact

## 2020-02-05 NOTE — PROGRESS NOTE ADULT - SUBJECTIVE AND OBJECTIVE BOX
ORTHOPEDIC PA PROGRESS NOTE  HARIS NOEL      64y Female                                                                                                                               POD # 0  Post Op check    STATUS POST:               Pre-Op Dx: Primary localized osteoarthritis of left hip    Post-Op Dx:  Primary localized osteoarthritis of left hip    Procedure: Total hip arthroplasty: Left , Anterior                                                Pain (0-10): 4  Current Pain Management:  [ ] PCA   [ x] Po Analgesics [x ] IM /IV Anagesics   c/o left hip pain(just medicated by PACU nurse)  Denies SOB, Chest pain, N/V  T(F): 98.3  HR: 56  BP: 143/71  RR: 20  SpO2: 93%                          Physical Exam :    -   Dressing C/D/I.  NUBIA in place  -   Distal Neurvascular status intact grossly.   -   Warm well perfused; capillary refill <3 seconds   -   (+)EHL/FHL 5/5  -   (+) Sensation to light touch  -   (-) Calf tenderness Bilaterally    A/P: 64y Female s/p Primary localized osteoarthritis of left hip    -   Ortho Stable  -   Pain control   -   Medicine to follow  -   DVT ppx:     [x ]SCDs     [ ] ASA     [ ] Eliquis     [ ] Lovenox  -   Weight bearing status:  WBAT [x ]        PWB    [ ]     TTWB  [ ]      NWB  [ ]   -  Dispo:     Home [ ]     Acute Rehab [ ]     COURTNEY [ ]     TBD [x ]

## 2020-02-05 NOTE — DISCHARGE NOTE PROVIDER - NSDCCPTREATMENT_GEN_ALL_CORE_FT
PRINCIPAL PROCEDURE  Procedure: Total replacement of left hip joint by anterior approach  Findings and Treatment: Severe DJD left hip

## 2020-02-05 NOTE — BRIEF OPERATIVE NOTE - NSICDXBRIEFPREOP_GEN_ALL_CORE_FT
PRE-OP DIAGNOSIS:  Primary localized osteoarthritis of left hip 05-Feb-2020 10:42:48  Gurjit Mccormick

## 2020-02-05 NOTE — DISCHARGE NOTE PROVIDER - CARE PROVIDERS DIRECT ADDRESSES
,[email or web address]

## 2020-02-05 NOTE — DISCHARGE NOTE PROVIDER - NSDCMRMEDTOKEN_GEN_ALL_CORE_FT
Avapro 300 mg oral tablet: 1 tab(s) orally once a day  latanoprost 0.005% ophthalmic solution: 1 drop(s) to each affected eye once a day (in the evening)  Norvasc 5 mg oral tablet: 1 tab(s) orally once a day  Pepcid 20 mg oral tablet: orally once a day  ProAir HFA 90 mcg/inh inhalation aerosol: 2 puff(s) inhaled 4 times a day, As Needed  Synthroid 100 mcg (0.1 mg) oral tablet: 1 tab(s) orally once a week 7th day   Synthroid 50 mcg (0.05 mg) oral tablet: 1 tab(s) orally once a day for 6  days   Toprol- mg oral tablet, extended release: orally 2 times a day  ZyrTEC 10 mg oral tablet: 1 tab(s) orally once a day acetaminophen 500 mg oral tablet: 2 tab(s) orally every 8 hours  apixaban 2.5 mg oral tablet: 1 tab orally every 12 hours  apixaban 2.5 mg oral tablet: 1 tab orally every 12 hours  Avapro 300 mg oral tablet: 1 tab(s) orally once a day  celecoxib 100 mg oral capsule: 1 cap orally every 12 hours  latanoprost 0.005% ophthalmic solution: 1 drop(s) to each affected eye once a day (in the evening)  loratadine 10 mg oral tablet: 1 tab(s) orally once a day  Norvasc 5 mg oral tablet: 1 tab(s) orally once a day  pantoprazole 40 mg oral delayed release tablet: 1 tab(s) orally once a day (before a meal)  Pepcid 20 mg oral tablet: orally once a day  polyethylene glycol 3350 oral powder for reconstitution: 17 gram(s) orally once a day  ProAir HFA 90 mcg/inh inhalation aerosol: 2 puff(s) inhaled 4 times a day, As Needed  senna oral tablet: 2 tab(s) orally once a day (at bedtime)  Synthroid 100 mcg (0.1 mg) oral tablet: 1 tab(s) orally once a week 7th day   Synthroid 50 mcg (0.05 mg) oral tablet: 1 tab(s) orally once a day for 6  days   Toprol- mg oral tablet, extended release: orally 2 times a day  ZyrTEC 10 mg oral tablet: 1 tab(s) orally once a day 3:1 Commode: To assist with ADLs s/p left anterior total hip replacement  acetaminophen 500 mg oral tablet: 2 tab(s) orally every 8 hours  apixaban 2.5 mg oral tablet: 1 tab orally every 12 hours  apixaban 2.5 mg oral tablet: 1 tab orally every 12 hours  Avapro 300 mg oral tablet: 1 tab(s) orally once a day  celecoxib 100 mg oral capsule: 1 cap orally every 12 hours  latanoprost 0.005% ophthalmic solution: 1 drop(s) to each affected eye once a day (in the evening)  Norvasc 5 mg oral tablet: 1 tab(s) orally once a day  oxyCODONE 5 mg oral tablet: 1 tab(s) orally every 4 hours, As Needed -Mild Pain (1 - 3) MDD:6  pantoprazole 40 mg oral delayed release tablet: 1 tab(s) orally once a day (before a meal)  polyethylene glycol 3350 oral powder for reconstitution: 17 gram(s) orally once a day  ProAir HFA 90 mcg/inh inhalation aerosol: 2 puff(s) inhaled 4 times a day, As Needed  Rolling Walker with 5 inch Wheels: To assist with ambulation s/p left anterior total hip replacement  senna oral tablet: 2 tab(s) orally once a day (at bedtime)  Synthroid 100 mcg (0.1 mg) oral tablet: 1 tab(s) orally once a week 7th day   Synthroid 50 mcg (0.05 mg) oral tablet: 1 tab(s) orally once a day for 6  days   Toprol- mg oral tablet, extended release: orally 2 times a day  ZyrTEC 10 mg oral tablet: 1 tab(s) orally once a day 3:1 Commode: To assist with ADLs s/p left anterior total hip replacement  acetaminophen 500 mg oral tablet: 2 tab(s) orally every 8 hours  apixaban 2.5 mg oral tablet: 1 tab orally every 12 hours  apixaban 2.5 mg oral tablet: 1 tab orally every 12 hours  Avapro 300 mg oral tablet: 1 tab(s) orally once a day  celecoxib 100 mg oral capsule: 1 cap orally every 12 hours  latanoprost 0.005% ophthalmic solution: 1 drop(s) to each affected eye once a day (in the evening)  Norvasc 5 mg oral tablet: 1 tab(s) orally once a day  oxyCODONE 5 mg oral tablet: 1 tab(s) orally every 4 hours, As Needed -Mild Pain (1 - 3) MDD:6  pantoprazole 40 mg oral delayed release tablet: 1 tab(s) orally once a day (before a meal)  polyethylene glycol 3350 oral powder for reconstitution: 17 gram(s) orally once a day  ProAir HFA 90 mcg/inh inhalation aerosol: 2 puff(s) inhaled 4 times a day, As Needed  Rolling Walker with 5 inch Wheels: To assist with ambulation s/p left anterior total hip replacement  senna oral tablet: 2 tab(s) orally once a day (at bedtime)  sulfamethoxazole-trimethoprim 800 mg-160 mg oral tablet: 1 tab orally every 12 hours.  take with probiotics  Synthroid 100 mcg (0.1 mg) oral tablet: 1 tab(s) orally once a week 7th day   Synthroid 50 mcg (0.05 mg) oral tablet: 1 tab(s) orally once a day for 6  days   Toprol- mg oral tablet, extended release: orally 2 times a day  ZyrTEC 10 mg oral tablet: 1 tab(s) orally once a day

## 2020-02-05 NOTE — PHYSICAL THERAPY INITIAL EVALUATION ADULT - ADDITIONAL COMMENTS
Lives in house with adult children, 3 steps to enter no handrail. Will stay on 1st floor. Bed/bath with tub on 1st floor. Owns a RW and cane. Adult children to assist pt upon d/c.

## 2020-02-05 NOTE — DISCHARGE NOTE PROVIDER - NSDCHHNEEDSERVICE_GEN_ALL_CORE
Rehabilitation services/Medication teaching and assessment/Observation and assessment/Teaching and training/Wound care and assessment

## 2020-02-05 NOTE — DISCHARGE NOTE PROVIDER - NSDCFUSCHEDAPPT_GEN_ALL_CORE_FT
HARIS COHEN ; 02/21/2020 ; [address]  HARIS COHEN ; 04/14/2020 ; [facility] [address] [place] [address] HARIS COHEN ; 02/21/2020 ; [address]  HARIS COHEN ; 04/14/2020 ; [facility] [address] [place] [address] HARIS COHEN ; 02/21/2020 ; [address]  HARIS COHEN ; 04/14/2020 ; [facility] [address] [place] [address] HARIS COHEN ; 02/21/2020 ; [address]  HARIS COHEN ; 04/14/2020 ; [facility] Marty [address] [place] [address] HARIS COHEN ; 02/21/2020 ; [address]  HARIS COHEN ; 04/14/2020 ; \[facility]"" Marty [address] [place] [address] HARIS COHEN ; 02/21/2020 ; [address]  HARIS COHEN ; 04/14/2020 ; [facility] Marty [address] [place] [address] HARIS COHEN ; 02/21/2020 ; [address]  HARIS COHEN ; 04/14/2020 ; [facility] [address] [place] [address] HARIS COHEN ; 02/21/2020 ; [address]  HARIS COHEN ; 04/14/2020 ; \[facility]"" Marty [address] [place] [address] HARIS COHEN ; 02/21/2020 ; [address]  HARIS COHEN ; 04/14/2020 ; [facility] [address] [place] [address] HARIS COHEN ; 02/21/2020 ; [address]  HARIS COHEN ; 04/14/2020 ; [facility] Marty [address] [place] [address]

## 2020-02-05 NOTE — CONSULT NOTE ADULT - SUBJECTIVE AND OBJECTIVE BOX
Date/Time Patient Seen:  		  Referring MD:   Data Reviewed	       Patient is a 64y old  Female who presents with a chief complaint of left hip pain (05 Feb 2020 11:19)      Subjective/HPI    in bed  seen and examined  vs and meds reviewed    Pre-Op Diagnosis, Post-Op Diagnosis and Procedure:    Pre-Op, Post-Op and Procedure Selector:  ·  PRE-OP DIAGNOSIS:  Primary localized osteoarthritis of left hip 05-Feb-2020 10:42:48  Gurjit Mccormick  ·  POST-OP DIAGNOSIS:  Primary localized osteoarthritis of left hip 05-Feb-2020 10:43:06  Gurjit Mccormick  ·  PROCEDURES:  Total hip arthroplasty 05-Feb-2020 10:42:18 Left , Anterior Gurjit Mccormick    63yo F admitted s/p LEFT THR today.  Has had approx 5 years of progressively worsening b/l hip pain L>>R, accompanied by difficulty with ambulation requiring walker recently, constant pain, sharp, radiation into lower back, tried outpatient PT with minimal relief, tried NSAIDS with some relief but developed hematuria in 2017 and NSAIDS were stopped.  Pain is 0-4/10 at rest, up to 8/10 with activity.    SOCIAL HISTORY:  Lives: with sone  Smoking Hx: none  ETOH consumption: none  Illicit Drug Use:  None    Allergies    adhesives (Rash)  Cardizem (Rash)  fish (Rash)  Flonase (Swelling)  Levaquin (Rash)    · Primary Care Provider	Dr Alexandrea Eduardo    Dr Hernadez vascular   · Care Providers for Follow up (PCP/Outpatient Provider)	Dr Kaba  cardiologist, Dr Perlman ( endocrinologist) [phone],    Social History:  · Marital Status	  · Occupation	Dietician at [employer]  · Notes	son     Substance Use History:  · Substance Use	caffeine  · Caffeine Type	coffee  · Caffeine Amount/Frequency	1-2 cups/cans per day     Alcohol Use History:  · Have you ever consumed alcohol	never     Tobacco Usage:  · Tobacco Usage: Never smoker     Passive Smoke Exposure:  · Passive Smoke Exposure	No    Presurgical Screening:    Cardiovascular:  · Activity	limited due to pain ,Ambulates with  walker  · Energy expenditure (mets)	4 mets  · Symptoms	none     Cardiac Tests:  · Last Echocardiogram	1/2020  · Last Stress Test	2019 No ischemia      FAMILY HX - HTN ASHD OBESITY       PAST MEDICAL & SURGICAL HISTORY:  Morbid obesity with BMI of 50.0-59.9, adult: BMI 51.9  Chronic venous insufficiency  Glaucoma  Hyperparathyroidism: being monitored by endocrinologist  Rosacea  Migraines  Allergic rhinitis  GERD (gastroesophageal reflux disease)  Raynauds disease  Hashimoto's thyroiditis  Vertigo  Hypothyroidism  HTN (hypertension)  Osteoarthritis, hip, bilateral  Osteoarthritis: bila  History of pterygium excision: left eye 2005  Tibialis posterior tendon rupture: repair and foot surgery  left 2012  S/P hysterectomy: 2002  Status post trigger finger release: right thumb and middle finger 2011  S/P carpal tunnel release: right 2009  Cyst of salivary gland: excision 1989  Adenoma of left breast: excision 1997  S/P excision of neuroma: left ankle 1988  S/P ankle ligament repair: left ankle 1985        Medication list         MEDICATIONS  (STANDING):  acetaminophen  IVPB .. 1000 milliGRAM(s) IV Intermittent every 6 hours  lactated ringers. 1000 milliLiter(s) (100 mL/Hr) IV Continuous <Continuous>    MEDICATIONS  (PRN):  HYDROmorphone  Injectable 0.5 milliGRAM(s) IV Push every 10 minutes PRN Moderate Pain (4 - 6)         Vitals log        ICU Vital Signs Last 24 Hrs  T(C): 36.8 (05 Feb 2020 11:06), Max: 37.1 (05 Feb 2020 07:11)  T(F): 98.3 (05 Feb 2020 11:06), Max: 98.7 (05 Feb 2020 07:11)  HR: 55 (05 Feb 2020 13:30) (55 - 76)  BP: 125/54 (05 Feb 2020 13:30) (113/59 - 161/78)  BP(mean): --  ABP: --  ABP(mean): --  RR: 18 (05 Feb 2020 13:30) (16 - 22)  SpO2: 92% (05 Feb 2020 13:30) (92% - 96%)           Input and Output:  I&O's Detail    05 Feb 2020 07:01  -  05 Feb 2020 14:00  --------------------------------------------------------  IN:    lactated ringers.: 1200 mL  Total IN: 1200 mL    OUT:    Estimated Blood Loss: 250 mL  Total OUT: 250 mL    Total NET: 950 mL          Lab Data                  Review of Systems	  snores at night time -     Objective     Physical Examination    head at  heart s1s2  lung dc bs  abd dec BS  obese  head nc  cn grossly int      Pertinent Lab findings & Imaging      Deandra:  NO   Adequate UO     I&O's Detail    05 Feb 2020 07:01  -  05 Feb 2020 14:00  --------------------------------------------------------  IN:    lactated ringers.: 1200 mL  Total IN: 1200 mL    OUT:    Estimated Blood Loss: 250 mL  Total OUT: 250 mL    Total NET: 950 mL               Discussed with:     Cultures:	        Radiology          EXAM:  FLOURO IN O.R. 1HR [id number]                                  PROCEDURE DATE:  02/05/2020          INTERPRETATION:  Imaging guidance was provided by the Department of Radiology for a procedure performed by a physician from another clinical department. This study was performed and will be interpreted by that physician and therefore the department of radiology will not render an interpretation of these images.    This report was generated by an  in the department of radiology.                  DEPARTMENT RADIOLOGY   This document has been electronically signed. Feb 5 2020 11:11AM

## 2020-02-05 NOTE — OCCUPATIONAL THERAPY INITIAL EVALUATION ADULT - ADDITIONAL COMMENTS
Lives with adult children. Was using a RW/Cane past few weeks and ambulating short distances. Has 3 steps to enter. 13 inside. Tub with showerchair however states its broken. No toilet DME in place at this time

## 2020-02-05 NOTE — DISCHARGE NOTE PROVIDER - NSDCCPCAREPLAN_GEN_ALL_CORE_FT
PRINCIPAL DISCHARGE DIAGNOSIS  Diagnosis: Primary osteoarthritis of left hip  Assessment and Plan of Treatment: You have had an Anterior Total Hip Replacement. Follow instructions given to you by your surgeon.   PT/OT Total Hip Protocol; Ambulation, transfers, stairs, & ADLs  Full weight bearing both legs; Walker/cane use as instructed by PT/OT  Anterior THR precautions for 4 weeks: No straight leg raise; No external rotation of hip when extended-standing or lying flat; No hyperextension of hip when standing (kickback)  Ice packs to hip  Suture removal on/near after Post Op Day # 14.  See PCP in office approximately 2-3 weeks from discharge for exam/labwork. PRINCIPAL DISCHARGE DIAGNOSIS  Diagnosis: Primary osteoarthritis of left hip  Assessment and Plan of Treatment: You have had an Anterior Total Hip Replacement. Follow instructions given to you by your surgeon.   PT/OT Total Hip Protocol; Ambulation, transfers, stairs, & ADLs  Full weight bearing both legs; Walker/cane use as instructed by PT/OT  Anterior THR precautions for 4 weeks: No straight leg raise; No external rotation of hip when extended-standing or lying flat; No hyperextension of hip when standing (kickback)  Ice packs to hip  Suture removal on/near after Post Op Day # 14.  See PCP in office approximately 2-3 weeks from discharge for exam/labwork.  Keep NUBIA dressing clean/dry/intact.  You may shower with NUBIA dressing on, disconnect battery pack prior to going in the shower and reconnect after.    Keep NUBIA dressing on for a week, after approx one week battery will die, at this time you may remove NUBIA dressing.  If wound is still draining or if skin fold is causing incision to rub on itself you may recover with clean sterile dressing. yes

## 2020-02-05 NOTE — CONSULT NOTE ADULT - PROBLEM SELECTOR RECOMMENDATION 9
post op care  I sanchez  remote tele monitoring  julieta suspected - morbid obesity  will check TSH and VBG  empiric setting CPAP night time - 6 cm water pressure - nasal pillows mask -   discussed plan of care with patient   dvt p  I sanchez  pain regimen - caution with Opioids -   bowel regimen  will follow  plan of care discussed with RT and RN and Family and Patient

## 2020-02-05 NOTE — DISCHARGE NOTE PROVIDER - HOSPITAL COURSE
This patient was admitted to [facility] with a history of severe degenerative joint disease of the left hip.  Patient went to Pre-Surgical Testing at [facility] and was medically cleared to undergo elective procedure. Patient underwent Left Anterior THR by Dr. Mari Harris on 2/5/20. Procedure was well tolerated.  No operative or serge-operative complications arose during patients hospital course.  Patient received antibiotic according to SCIP guidelines for infection prevention.  Eliquis 2.5mg q 12h was given for DVT prophylaxis, in addition to the use of SCDs.  Anesthesia, Medical Hospitalist, Physical Therapy and Occupational Therapy were consulted. Patient is stable for discharge with a good prognosis.  Appropriate discharge instructions and medications are provided in this document. This patient was admitted to [facility] with a history of severe degenerative joint disease of the left hip.  Patient went to Pre-Surgical Testing at [facility] and was medically cleared to undergo elective procedure. Patient underwent Left Anterior THR by Dr. Mari Harris on 2/5/20. Procedure was well tolerated.  No operative or serge-operative complications arose during patients hospital course.  Patient received antibiotic according to SCIP guidelines for infection prevention.  Eliquis 2.5mg q 12h was given for DVT prophylaxis, in addition to the use of SCDs.  Anesthesia, Medical Hospitalist, Physical Therapy and Occupational Therapy were consulted. Patient is stable for discharge with a good prognosis.  Appropriate discharge instructions and medications are provided in this document.    possible JOSE RAUL , started on CPAP after SX. needs follow up with Dr Whatley pulmonalogist as outpatient for sleep study test. This patient was admitted to [facility] with a history of severe degenerative joint disease of the left hip.  Patient went to Pre-Surgical Testing at [facility] and was medically cleared to undergo elective procedure. Patient underwent Left Anterior THR by Dr. Mari Harris on 2/5/20. Procedure was well tolerated.  No operative or serge-operative complications arose during patients hospital course.  Patient received antibiotic according to SCIP guidelines for infection prevention.  Eliquis 2.5mg q 12h was given for DVT prophylaxis, in addition to the use of SCDs.  Anesthesia, Medical Hospitalist, Physical Therapy and Occupational Therapy were consulted. Patient with mild erythema and swelling left lower extremity on 2/10/20. She was therefore put on Bactrim prophylactically per Dr. Harris, because of prior h/o cellulitis.  Patient is stable for discharge with a good prognosis.  Appropriate discharge instructions and medications are provided in this document.    possible JOSE RAUL , started on CPAP after SX. needs follow up with Dr Whatley pulmonalogist as outpatient for sleep study test. This patient was admitted to [facility] with a history of severe degenerative joint disease of the left hip.  Patient went to Pre-Surgical Testing at [facility] and was medically cleared to undergo elective procedure. Patient underwent Left Anterior THR by Dr. Mari Harris on 2/5/20. Procedure was well tolerated.  No operative or serge-operative complications arose during patients hospital course.  Patient received antibiotic according to SCIP guidelines for infection prevention.  Eliquis 2.5mg q 12h was given for DVT prophylaxis, in addition to the use of SCDs.  Anesthesia, Medical Hospitalist, Physical Therapy and Occupational Therapy were consulted. Patient with mild erythema and swelling left lower extremity on 2/10/20. She was therefore put on Bactrim prophylactically per Dr. Harris, because of prior h/o cellulitis.  Patient is stable for discharge with a good prognosis.  Appropriate discharge instructions and medications are provided in this document.    possible JOSE RAUL , started on CPAP after SX.

## 2020-02-05 NOTE — CONSULT NOTE ADULT - SUBJECTIVE AND OBJECTIVE BOX
Information Obtained from:  EHR, Physical Chart, Patient at bedside (relevant EHR and Chart information verified with patient)    HPI:  65yo F admitted s/p LEFT THR today.  Has had approx 5 years of progressively worsening b/l hip pain L>>R, accompanied by difficulty with ambulation requiring walker recently, constant pain, sharp, radiation into lower back, tried outpatient PT with minimal relief, tried NSAIDS with some relief but developed hematuria in 2017 and NSAIDS were stopped.  Pain is 0-4/10 at rest, up to 8/10 with activity.       REVIEW OF SYSTEMS:  CONSTITUTIONAL: No fever, weight loss, or fatigue  EYES: No eye pain, visual disturbances, or discharge  ENMT:  No difficulty hearing, tinnitus, vertigo; No sinus or throat pain  NECK: No pain or stiffness  BREASTS: No pain, masses, or nipple discharge  RESPIRATORY: No cough, wheezing, chills or hemoptysis; No shortness of breath  CARDIOVASCULAR: No chest pain, palpitations, dizziness, or leg swelling  GASTROINTESTINAL: No abdominal or epigastric pain. No nausea, vomiting, or hematemesis; No diarrhea or constipation. No melena or hematochezia.  GENITOURINARY: No dysuria, frequency, hematuria, or incontinence  NEUROLOGICAL: No headaches, memory loss, loss of strength, numbness, or tremors  SKIN: No itching, burning, rashes, or lesions   LYMPH NODES: No enlarged glands  ENDOCRINE: No heat or cold intolerance; No hair loss  MUSCULOSKELETAL: No myalgias  PSYCHIATRIC: No depression, anxiety, mood swings, or difficulty sleeping  HEME/LYMPH: No easy bruising, or bleeding gums  ALLERGY AND IMMUNOLOGIC: No hives or eczema    PAST MEDICAL & SURGICAL HISTORY:  Morbid obesity with BMI of 50.0-59.9, adult: BMI 51.9  Chronic venous insufficiency  Glaucoma  Hyperparathyroidism: being monitored by endocrinologist  Rosacea  Migraines  Allergic rhinitis  GERD (gastroesophageal reflux disease)  Raynauds disease  Hashimoto's thyroiditis  Vertigo  HTN (hypertension)  Osteoarthritis, hip, bilateral  History of pterygium excision: left eye 2005  Tibialis posterior tendon rupture: repair and foot surgery  left 2012  S/P hysterectomy: 2002  Status post trigger finger release: right thumb and middle finger 2011  S/P carpal tunnel release: right 2009  Cyst of salivary gland: excision 1989  Adenoma of left breast: excision 1997  S/P excision of neuroma: left ankle 1988  S/P ankle ligament repair: left ankle 1985    SOCIAL HISTORY:  Lives: with sone  Smoking Hx: none  ETOH consumption: none  Illicit Drug Use:  None    Allergies    adhesives (Rash)  Cardizem (Rash)  fish (Rash)  Flonase (Swelling)  Levaquin (Rash)    Intolerances    Home Medications:  Avapro 300 mg oral tablet: 1 tab(s) orally once a day (05 Feb 2020 07:09)  latanoprost 0.005% ophthalmic solution: 1 drop(s) to each affected eye once a day (in the evening) (05 Feb 2020 07:09)  Norvasc 5 mg oral tablet: 1 tab(s) orally once a day (05 Feb 2020 07:09)  Pepcid 20 mg oral tablet: orally once a day (05 Feb 2020 07:09)  ProAir HFA 90 mcg/inh inhalation aerosol: 2 puff(s) inhaled 4 times a day, As Needed (05 Feb 2020 07:09)  Synthroid 100 mcg (0.1 mg) oral tablet: 1 tab(s) orally once a week 7th day  (05 Feb 2020 07:09)  Synthroid 50 mcg (0.05 mg) oral tablet: 1 tab(s) orally once a day for 6  days  (05 Feb 2020 07:09)  Toprol- mg oral tablet, extended release: orally 2 times a day (05 Feb 2020 07:09)  ZyrTEC 10 mg oral tablet: 1 tab(s) orally once a day (05 Feb 2020 07:09)    FAMILY HISTORY:  FH: HTN (hypertension): sisters  Family history of pheochromocytoma: sister  Family history of breast cancer in sister: HTN  FH: HTN (hypertension): TIA; Mother  Family history of colon cancer in father: prostate cancer , HTN  Family history of early CAD: CABG father    PHYSICAL EXAM:  Vital Signs Last 24 Hrs  T(C): 37.1 (05 Feb 2020 07:11), Max: 37.1 (05 Feb 2020 07:11)  T(F): 98.7 (05 Feb 2020 07:11), Max: 98.7 (05 Feb 2020 07:11)  HR: 76 (05 Feb 2020 07:11) (76 - 76)  BP: 161/78 (05 Feb 2020 07:11) (161/78 - 161/78)  BP(mean): --  RR: 18 (05 Feb 2020 07:11) (18 - 18)  SpO2: 95% (05 Feb 2020 07:11) (95% - 95%)    GENERAL: NAD, well-groomed, well-developed, awake, alert, oriented x 3, fluent and coherent speech  EYES: EOMI, PERRLA, conjunctiva and sclera clear  ENMT: No tonsillar erythema, exudates, or enlargement; Moist mucous membranes, Good dentition, No lesions  NECK: Supple, No JVD, No Cervical LAD, No thyromegaly, No thyroid nodules  NERVOUS SYSTEM:  Good concentration;  No facial droop  CHEST/LUNG: Clear to auscultation bilaterally; No rales, rhonchi, wheezing, or rubs  HEART: Regular rate and rhythm; No murmurs, rubs, or gallops  ABDOMEN: Soft, Nontender, Nondistended, Bowel sounds present, No palpable masses or organomegaly, No bruits  EXTREMITIES:  2+ Peripheral Pulses, No clubbing, cyanosis, or edema  INCISION:  Dressing clean/dry/intact       EKG (was personally reviewed): Information Obtained from:  EHR, Physical Chart, Patient at bedside (relevant EHR and Chart information verified with patient)    HPI:  65yo F admitted s/p LEFT THR today.  Has had approx 5 years of progressively worsening b/l hip pain L>>R, accompanied by difficulty with ambulation requiring walker recently, constant pain, sharp, radiation into lower back, tried outpatient PT with minimal relief, tried NSAIDS with some relief but developed hematuria in 2017 and NSAIDS were stopped.  Pain is 0-4/10 at rest, up to 8/10 with activity.       REVIEW OF SYSTEMS:  CONSTITUTIONAL: No fever, weight loss, or fatigue  EYES: No eye pain, visual disturbances, or discharge  ENMT:  No difficulty hearing, tinnitus, vertigo; No sinus or throat pain  NECK: No pain or stiffness  BREASTS: No pain, masses, or nipple discharge  RESPIRATORY: No cough, wheezing, chills or hemoptysis; No shortness of breath  CARDIOVASCULAR: No chest pain, palpitations, dizziness, or leg swelling  GASTROINTESTINAL: No abdominal or epigastric pain. No nausea, vomiting, or hematemesis; No diarrhea or constipation. No melena or hematochezia.  GENITOURINARY: No dysuria, frequency, hematuria, or incontinence  NEUROLOGICAL: No headaches, memory loss, loss of strength, numbness, or tremors  SKIN: No itching, burning, rashes, or lesions   LYMPH NODES: No enlarged glands  ENDOCRINE: No heat or cold intolerance; No hair loss  MUSCULOSKELETAL: No myalgias  PSYCHIATRIC: No depression, anxiety, mood swings, or difficulty sleeping  HEME/LYMPH: No easy bruising, or bleeding gums  ALLERGY AND IMMUNOLOGIC: No hives or eczema    PAST MEDICAL & SURGICAL HISTORY:  Morbid obesity with BMI of 50.0-59.9, adult: BMI 51.9  Chronic venous insufficiency  Glaucoma  Hyperparathyroidism: being monitored by endocrinologist  Rosacea  Migraines  Allergic rhinitis  GERD (gastroesophageal reflux disease)  Raynauds disease  Hashimoto's thyroiditis  Vertigo  HTN (hypertension)  Osteoarthritis, hip, bilateral  History of pterygium excision: left eye 2005  Tibialis posterior tendon rupture: repair and foot surgery  left 2012  S/P hysterectomy: 2002  Status post trigger finger release: right thumb and middle finger 2011  S/P carpal tunnel release: right 2009  Cyst of salivary gland: excision 1989  Adenoma of left breast: excision 1997  S/P excision of neuroma: left ankle 1988  S/P ankle ligament repair: left ankle 1985    SOCIAL HISTORY:  Lives: with sone  Smoking Hx: none  ETOH consumption: none  Illicit Drug Use:  None    Allergies    adhesives (Rash)  Cardizem (Rash)  fish (Rash)  Flonase (Swelling)  Levaquin (Rash)    Intolerances    Home Medications:  Avapro 300 mg oral tablet: 1 tab(s) orally once a day (05 Feb 2020 07:09)  latanoprost 0.005% ophthalmic solution: 1 drop(s) to each affected eye once a day (in the evening) (05 Feb 2020 07:09)  Norvasc 5 mg oral tablet: 1 tab(s) orally once a day (05 Feb 2020 07:09)  Pepcid 20 mg oral tablet: orally once a day (05 Feb 2020 07:09)  ProAir HFA 90 mcg/inh inhalation aerosol: 2 puff(s) inhaled 4 times a day, As Needed (05 Feb 2020 07:09)  Synthroid 100 mcg (0.1 mg) oral tablet: 1 tab(s) orally once a week 7th day  (05 Feb 2020 07:09)  Synthroid 50 mcg (0.05 mg) oral tablet: 1 tab(s) orally once a day for 6  days  (05 Feb 2020 07:09)  Toprol- mg oral tablet, extended release: orally 2 times a day (05 Feb 2020 07:09)  ZyrTEC 10 mg oral tablet: 1 tab(s) orally once a day (05 Feb 2020 07:09)    FAMILY HISTORY:  FH: HTN (hypertension): sisters  Family history of pheochromocytoma: sister  Family history of breast cancer in sister: HTN  FH: HTN (hypertension): TIA; Mother  Family history of colon cancer in father: prostate cancer , HTN  Family history of early CAD: CABG father    PHYSICAL EXAM:  Vital Signs Last 24 Hrs  T(C): 37.1 (05 Feb 2020 07:11), Max: 37.1 (05 Feb 2020 07:11)  T(F): 98.7 (05 Feb 2020 07:11), Max: 98.7 (05 Feb 2020 07:11)  HR: 76 (05 Feb 2020 07:11) (76 - 76)  BP: 161/78 (05 Feb 2020 07:11) (161/78 - 161/78)  BP(mean): --  RR: 18 (05 Feb 2020 07:11) (18 - 18)  SpO2: 95% (05 Feb 2020 07:11) (95% - 95%)    GENERAL: NAD, well-groomed, well-developed, awake, alert, oriented x 3, fluent and coherent speech  EYES: EOMI, PERRLA, conjunctiva and sclera clear  ENMT: No tonsillar erythema, exudates, or enlargement; Moist mucous membranes, Good dentition, No lesions  NECK: Supple, No JVD, No Cervical LAD, No thyromegaly, No thyroid nodules  NERVOUS SYSTEM:  Good concentration;  No facial droop  CHEST/LUNG: Clear to auscultation bilaterally; No rales, rhonchi, wheezing, or rubs  HEART: Regular rate and rhythm; No murmurs, rubs, or gallops  ABDOMEN: Soft, Nontender, obese, Bowel sounds present, No palpable masses or organomegaly, No bruits  EXTREMITIES:  2+ Peripheral Pulses, No clubbing, cyanosis, +1 edema b/l LE  INCISION:  Dressing clean/dry/intact       EKG (was personally reviewed):

## 2020-02-05 NOTE — PHYSICAL THERAPY INITIAL EVALUATION ADULT - GAIT TRAINING, PT EVAL
Pt will ambulate 150 feet with RW independently in 1-2 days . Negotiate 15 steps with 1 handrail and cane independently WBAT  LE in 1-2 days.

## 2020-02-05 NOTE — PHYSICAL THERAPY INITIAL EVALUATION ADULT - ACTIVE RANGE OF MOTION EXAMINATION, REHAB EVAL
Left Hip decreased due to sx. Left knee and ankle WNL/RLE Active ROM was WNL (within normal limits)/deficits as listed below/baron. upper extremity Active ROM was WNL (within normal limits)

## 2020-02-05 NOTE — DISCHARGE NOTE PROVIDER - NSDCFUADDAPPT_GEN_ALL_CORE_FT
It is advisable to follow up with your primary care provider within the next 2-3 weeks to ensure your medications are appropriate and there are no underlying problems after your procedure. 2/21/2020 @10:30  Mari Harris MD  [address] [facility],   [address]  [place] [address]  Tel: [phone]  Fax: [phone]     It is advisable to follow up with your primary care provider within the next 2-3 weeks to ensure your medications are appropriate and there are no underlying problems after your procedure.

## 2020-02-05 NOTE — DISCHARGE NOTE PROVIDER - NSDCACTIVITY_GEN_ALL_CORE
Walking - Outdoors allowed/Walking - Indoors allowed/No heavy lifting/straining/Stairs allowed/Do not drive or operate machinery

## 2020-02-05 NOTE — DISCHARGE NOTE PROVIDER - CARE PROVIDER_API CALL
Mari Harris)  Orthopedics  [address] [facility], [address]  [place] [address]  Phone: [phone]  Fax: [phone]  Established Patient  Scheduled Appointment: 02/21/2020 10:30 AM Mari Harris)  Orthopedics  [address] [facility] [address], [address]  [place] [address]  Phone: [phone]  Fax: [phone]  Established Patient  Scheduled Appointment: 02/21/2020 10:30 AM    Yandel Whatley)  Critical Care Medicine; Internal Medicine; Pulmonary Disease  [address] [place] [address]  Phone: [phone]  Fax: [phone]  Follow Up Time: Mari Harris)  Orthopedics  [address] [facility] [address]  [place] [address]  Phone: [phone]  Fax: [phone]  Established Patient  Scheduled Appointment: 02/21/2020 10:30 AM    Page Barroso)  Critical Care Medicine; Internal Medicine; Pulmonary Disease  [address] [facility], [address]  [place] [address]  Phone: [phone]  Fax: [phone]  Follow Up Time:

## 2020-02-05 NOTE — DISCHARGE NOTE PROVIDER - PROVIDER TOKENS
PROVIDER:[[id number]],SCHEDULEDAPPT:[02/21/2020],SCHEDULEDAPPTTIME:[10:30 AM],ESTABLISHEDPATIENT:[T]] PROVIDER:[[id number]],SCHEDULEDAPPT:[02/21/2020],SCHEDULEDAPPTTIME:[10:30 AM],ESTABLISHEDPATIENT:[T]],PROVIDER:[TOKEN:[[id number]]] PROVIDER:[TOKEN:[[id number]],SCHEDULEDAPPT:[02/21/2020],SCHEDULEDAPPTTIME:[10:30 AM],ESTABLISHEDPATIENT:[T]],PROVIDER:[TOKEN:[[id number]]]

## 2020-02-05 NOTE — CONSULT NOTE ADULT - ASSESSMENT
POD#0 s/p LEFT THR  - Pain control  - Bowel regimen  - PT/OT  - VTE PPx - Eliquis    HTN  -     Allergic Rhinitis  -     Glaucoma  - continue latanoprost    Hyperparathyroidism  - followed by Dr. Perlman - Endocrinologist  - explains mild elevation in Calcium on pre-op labs    Mild LFT Elev on preop labs  - repeat in AM POD#0 s/p LEFT THR  - Pain control  - Bowel regimen  - PT/OT  - VTE PPx - Eliquis    HTN  - continue toprol  - resume norvasc on POD#2  - resume ARB on POD#2    Allergic Rhinitis  - continue proair PRN  - can use loratadine instead of zyrtec    Glaucoma  - continue latanoprost    Hyperparathyroidism, subcentimeter thyroid nodules  - followed by Dr. Perlman - Endocrinologist  - explains mild elevation in Calcium on pre-op labs  - being managed conservatively for now    Mild LFT Elev on preop labs  - repeat in AM    Likely has undiagnosed JOSE RAUL  - reports loud snoring and unrestful sleep  - never tested for JOSE RAUL  - had desaturations down to 78% during surgery and in PACU  - monitor on tele  - Pulm Eval requested, may need CPAP starting tonight    B/L LE Edema  - cardiac clearance reviewed in chart  - echo showed normal EF, abnormal relaxation  - vascular clearance reviewed - has venous HTN in b/l legs, had LE dopplers on 1/20/20 which were neg for DVT

## 2020-02-06 ENCOUNTER — TRANSCRIPTION ENCOUNTER (OUTPATIENT)
Age: 65
End: 2020-02-06

## 2020-02-06 LAB
ALBUMIN SERPL ELPH-MCNC: 2.6 G/DL — LOW (ref 3.3–5)
ALP SERPL-CCNC: 80 U/L — SIGNIFICANT CHANGE UP (ref 30–120)
ALT FLD-CCNC: 44 U/L DA — SIGNIFICANT CHANGE UP (ref 10–60)
ANION GAP SERPL CALC-SCNC: 6 MMOL/L — SIGNIFICANT CHANGE UP (ref 5–17)
AST SERPL-CCNC: 21 U/L — SIGNIFICANT CHANGE UP (ref 10–40)
BASE EXCESS BLDV CALC-SCNC: 5.1 MMOL/L — HIGH (ref -2–2)
BILIRUB DIRECT SERPL-MCNC: 0.1 MG/DL — SIGNIFICANT CHANGE UP (ref 0–0.2)
BILIRUB INDIRECT FLD-MCNC: 0.4 MG/DL — SIGNIFICANT CHANGE UP (ref 0.2–1)
BILIRUB SERPL-MCNC: 0.5 MG/DL — SIGNIFICANT CHANGE UP (ref 0.2–1.2)
BUN SERPL-MCNC: 12 MG/DL — SIGNIFICANT CHANGE UP (ref 7–23)
CALCIUM SERPL-MCNC: 10.5 MG/DL — SIGNIFICANT CHANGE UP (ref 8.4–10.5)
CHLORIDE SERPL-SCNC: 104 MMOL/L — SIGNIFICANT CHANGE UP (ref 96–108)
CO2 SERPL-SCNC: 29 MMOL/L — SIGNIFICANT CHANGE UP (ref 22–31)
CREAT SERPL-MCNC: 0.65 MG/DL — SIGNIFICANT CHANGE UP (ref 0.5–1.3)
GAS PNL BLDV: SIGNIFICANT CHANGE UP
GLUCOSE SERPL-MCNC: 113 MG/DL — HIGH (ref 70–99)
HCO3 BLDV-SCNC: 28 MMOL/L — SIGNIFICANT CHANGE UP (ref 21–29)
HCT VFR BLD CALC: 33.7 % — LOW (ref 34.5–45)
HGB BLD-MCNC: 11 G/DL — LOW (ref 11.5–15.5)
HOROWITZ INDEX BLDV+IHG-RTO: 21 — SIGNIFICANT CHANGE UP
MCHC RBC-ENTMCNC: 29.8 PG — SIGNIFICANT CHANGE UP (ref 27–34)
MCHC RBC-ENTMCNC: 32.6 GM/DL — SIGNIFICANT CHANGE UP (ref 32–36)
MCV RBC AUTO: 91.3 FL — SIGNIFICANT CHANGE UP (ref 80–100)
NRBC # BLD: 0 /100 WBCS — SIGNIFICANT CHANGE UP (ref 0–0)
PCO2 BLDV: 53 MMHG — HIGH (ref 35–50)
PH BLDV: 7.37 — SIGNIFICANT CHANGE UP (ref 7.35–7.45)
PLATELET # BLD AUTO: 310 K/UL — SIGNIFICANT CHANGE UP (ref 150–400)
PO2 BLDV: 39 MMHG — SIGNIFICANT CHANGE UP (ref 25–45)
POTASSIUM SERPL-MCNC: 4.1 MMOL/L — SIGNIFICANT CHANGE UP (ref 3.5–5.3)
POTASSIUM SERPL-SCNC: 4.1 MMOL/L — SIGNIFICANT CHANGE UP (ref 3.5–5.3)
PROT SERPL-MCNC: 5.7 G/DL — LOW (ref 6–8.3)
RBC # BLD: 3.69 M/UL — LOW (ref 3.8–5.2)
RBC # FLD: 12.9 % — SIGNIFICANT CHANGE UP (ref 10.3–14.5)
SAO2 % BLDV: 70 % — SIGNIFICANT CHANGE UP (ref 67–88)
SODIUM SERPL-SCNC: 139 MMOL/L — SIGNIFICANT CHANGE UP (ref 135–145)
TSH SERPL-MCNC: 1.04 UIU/ML — SIGNIFICANT CHANGE UP (ref 0.27–4.2)
WBC # BLD: 11.97 K/UL — HIGH (ref 3.8–10.5)
WBC # FLD AUTO: 11.97 K/UL — HIGH (ref 3.8–10.5)

## 2020-02-06 PROCEDURE — 99233 SBSQ HOSP IP/OBS HIGH 50: CPT

## 2020-02-06 RX ADMIN — OXYCODONE HYDROCHLORIDE 5 MILLIGRAM(S): 5 TABLET ORAL at 21:21

## 2020-02-06 RX ADMIN — SODIUM CHLORIDE 100 MILLILITER(S): 9 INJECTION, SOLUTION INTRAVENOUS at 01:07

## 2020-02-06 RX ADMIN — OXYCODONE HYDROCHLORIDE 5 MILLIGRAM(S): 5 TABLET ORAL at 09:00

## 2020-02-06 RX ADMIN — OXYCODONE HYDROCHLORIDE 5 MILLIGRAM(S): 5 TABLET ORAL at 17:22

## 2020-02-06 RX ADMIN — OXYCODONE HYDROCHLORIDE 5 MILLIGRAM(S): 5 TABLET ORAL at 04:49

## 2020-02-06 RX ADMIN — Medication 400 MILLIGRAM(S): at 03:53

## 2020-02-06 RX ADMIN — SENNA PLUS 2 TABLET(S): 8.6 TABLET ORAL at 21:21

## 2020-02-06 RX ADMIN — Medication 1000 MILLIGRAM(S): at 08:25

## 2020-02-06 RX ADMIN — OXYCODONE HYDROCHLORIDE 5 MILLIGRAM(S): 5 TABLET ORAL at 08:24

## 2020-02-06 RX ADMIN — APIXABAN 2.5 MILLIGRAM(S): 2.5 TABLET, FILM COATED ORAL at 21:21

## 2020-02-06 RX ADMIN — OXYCODONE HYDROCHLORIDE 5 MILLIGRAM(S): 5 TABLET ORAL at 21:51

## 2020-02-06 RX ADMIN — Medication 1000 MILLIGRAM(S): at 17:20

## 2020-02-06 RX ADMIN — Medication 50 MICROGRAM(S): at 10:35

## 2020-02-06 RX ADMIN — OXYCODONE HYDROCHLORIDE 5 MILLIGRAM(S): 5 TABLET ORAL at 18:00

## 2020-02-06 RX ADMIN — CELECOXIB 100 MILLIGRAM(S): 200 CAPSULE ORAL at 08:25

## 2020-02-06 RX ADMIN — Medication 100 MILLIGRAM(S): at 05:36

## 2020-02-06 RX ADMIN — POLYETHYLENE GLYCOL 3350 17 GRAM(S): 17 POWDER, FOR SOLUTION ORAL at 08:28

## 2020-02-06 RX ADMIN — OXYCODONE HYDROCHLORIDE 5 MILLIGRAM(S): 5 TABLET ORAL at 04:19

## 2020-02-06 RX ADMIN — Medication 1000 MILLIGRAM(S): at 09:00

## 2020-02-06 RX ADMIN — OXYCODONE HYDROCHLORIDE 5 MILLIGRAM(S): 5 TABLET ORAL at 12:46

## 2020-02-06 RX ADMIN — OXYCODONE HYDROCHLORIDE 5 MILLIGRAM(S): 5 TABLET ORAL at 13:20

## 2020-02-06 RX ADMIN — Medication 1000 MILLIGRAM(S): at 04:00

## 2020-02-06 RX ADMIN — Medication 1000 MILLIGRAM(S): at 17:23

## 2020-02-06 RX ADMIN — CELECOXIB 100 MILLIGRAM(S): 200 CAPSULE ORAL at 22:00

## 2020-02-06 RX ADMIN — LATANOPROST 1 DROP(S): 0.05 SOLUTION/ DROPS OPHTHALMIC; TOPICAL at 21:21

## 2020-02-06 RX ADMIN — APIXABAN 2.5 MILLIGRAM(S): 2.5 TABLET, FILM COATED ORAL at 08:26

## 2020-02-06 RX ADMIN — CELECOXIB 100 MILLIGRAM(S): 200 CAPSULE ORAL at 21:21

## 2020-02-06 RX ADMIN — PANTOPRAZOLE SODIUM 40 MILLIGRAM(S): 20 TABLET, DELAYED RELEASE ORAL at 05:36

## 2020-02-06 RX ADMIN — CELECOXIB 100 MILLIGRAM(S): 200 CAPSULE ORAL at 08:27

## 2020-02-06 RX ADMIN — Medication 200 MILLIGRAM(S): at 01:06

## 2020-02-06 NOTE — PROGRESS NOTE ADULT - SUBJECTIVE AND OBJECTIVE BOX
INTERVAL HPI/OVERNIGHT EVENTS:   Patient seen and examined.  Tolerated CPAP overnight.  No fevers, chills, sweats, dizziness, HA, changes in vision, cp, palpitations, sob, persistent cough, n/v/d, abd pain, dysuria, focal weakness, or calf pain.   Eating, voiding, no BM yet.      REVIEW OF SYSTEMS:  See HPI,  all others negative    PHYSICAL EXAM:  Vital Signs Last 24 Hrs  T(C): 37.2 (2020 07:41), Max: 37.2 (2020 07:41)  T(F): 99 (2020 07:41), Max: 99 (2020 07:41)  HR: 54 (2020 07:41) (52 - 74)  BP: 137/65 (2020 07:41) (113/59 - 149/76)  BP(mean): --  RR: 17 (2020 07:41) (12 - 22)  SpO2: 92% (2020 07:41) (92% - 98%)    GENERAL: NAD, well-groomed, well-developed, awake, alert, oriented x 3, fluent and coherent speech  EYES: EOMI, PERRLA, conjunctiva and sclera clear  ENMT: No tonsillar erythema, exudates, or enlargement; Moist mucous membranes   NECK: Supple, No JVD, No Cervical LAD, No thyromegaly, No thyroid nodules felt  NERVOUS SYSTEM:  Good concentration; Moving all 4 extremities against gravity and resistance; No gross sensory deficits, No facial droop  CHEST/LUNG: Clear to auscultation bilaterally; No rales, rhonchi, wheezing, or rubs  HEART: Regular rate and rhythm; No murmurs, rubs, or gallops  ABDOMEN: Soft, Nontender, obese, Bowel sounds present, No palpable masses or organomegaly (though limited exam due to body habitus), No bruits  EXTREMITIES:  2+ Peripheral Pulses, No clubbing, cyanosis, trace b/l LE edema, no calf tenderness in either leg       Diagnostic Testin.0   11.97 )-----------( 310      ( 2020 07:22 )             33.7     2020 07:18    139    |  104    |  12     ----------------------------<  113    4.1     |  29     |  0.65     Ca    10.5       2020 07:18    TPro  5.7    /  Alb  2.6    /  TBili  0.5    /  DBili  0.1    /  AST  21     /  ALT  44     /  AlkPhos  80     2020 07:18

## 2020-02-06 NOTE — DISCHARGE NOTE NURSING/CASE MANAGEMENT/SOCIAL WORK - PATIENT PORTAL LINK FT
You can access the FollowMyHealth Patient Portal offered by [facility] by registering at the following website: [email or web address]. By joining [employer]’s FollowMyHealth portal, you will also be able to view your health information using other applications (apps) compatible with our system.

## 2020-02-06 NOTE — PROGRESS NOTE ADULT - SUBJECTIVE AND OBJECTIVE BOX
Date/Time Patient Seen:  		  Referring MD:   Data Reviewed	       Patient is a 64y old  Female who presents with a chief complaint of Left Anterior THR for severe left hip OA (05 Feb 2020 16:12)      Subjective/HPI     PAST MEDICAL & SURGICAL HISTORY:  Morbid obesity with BMI of 50.0-59.9, adult: BMI 51.9  Chronic venous insufficiency  Glaucoma  Hyperparathyroidism: being monitored by endocrinologist  Rosacea  Migraines  Allergic rhinitis  GERD (gastroesophageal reflux disease)  Raynauds disease  Hashimoto's thyroiditis  Vertigo  Hypothyroidism  HTN (hypertension)  Osteoarthritis, hip, bilateral  Osteoarthritis: bila  History of pterygium excision: left eye 2005  Tibialis posterior tendon rupture: repair and foot surgery  left 2012  S/P hysterectomy: 2002  Status post trigger finger release: right thumb and middle finger 2011  S/P carpal tunnel release: right 2009  Cyst of salivary gland: excision 1989  Adenoma of left breast: excision 1997  S/P excision of neuroma: left ankle 1988  S/P ankle ligament repair: left ankle 1985        Medication list         MEDICATIONS  (STANDING):  acetaminophen   Tablet .. 1000 milliGRAM(s) Oral every 8 hours  apixaban 2.5 milliGRAM(s) Oral every 12 hours  celecoxib 100 milliGRAM(s) Oral every 12 hours  lactated ringers. 1000 milliLiter(s) (100 mL/Hr) IV Continuous <Continuous>  latanoprost 0.005% Ophthalmic Solution 1 Drop(s) Both EYES at bedtime  levothyroxine 50 MICROGram(s) Oral <User Schedule>  levothyroxine 100 MICROGram(s) Oral <User Schedule>  loratadine 10 milliGRAM(s) Oral daily  metoprolol succinate  milliGRAM(s) Oral every 12 hours  pantoprazole    Tablet 40 milliGRAM(s) Oral before breakfast  polyethylene glycol 3350 17 Gram(s) Oral daily  senna 2 Tablet(s) Oral at bedtime    MEDICATIONS  (PRN):  ALBUTerol    90 MICROgram(s) HFA Inhaler 2 Puff(s) Inhalation every 4 hours PRN Shortness of Breath and/or Wheezing  aluminum hydroxide/magnesium hydroxide/simethicone Suspension 30 milliLiter(s) Oral four times a day PRN Indigestion  HYDROmorphone  Injectable 0.5 milliGRAM(s) IV Push every 3 hours PRN Severe Pain (7 - 10)  magnesium hydroxide Suspension 30 milliLiter(s) Oral daily PRN Constipation  ondansetron Injectable 4 milliGRAM(s) IV Push every 6 hours PRN Nausea and/or Vomiting  oxyCODONE    IR 5 milliGRAM(s) Oral every 3 hours PRN Mild Pain (1 - 3)  oxyCODONE    IR 10 milliGRAM(s) Oral every 3 hours PRN Moderate Pain (4 - 6)         Vitals log        ICU Vital Signs Last 24 Hrs  T(C): 37.2 (06 Feb 2020 07:41), Max: 37.2 (06 Feb 2020 07:41)  T(F): 99 (06 Feb 2020 07:41), Max: 99 (06 Feb 2020 07:41)  HR: 54 (06 Feb 2020 07:41) (52 - 74)  BP: 137/65 (06 Feb 2020 07:41) (113/59 - 149/76)  BP(mean): --  ABP: --  ABP(mean): --  RR: 17 (06 Feb 2020 07:41) (12 - 22)  SpO2: 92% (06 Feb 2020 07:41) (92% - 98%)           Input and Output:  I&O's Detail    05 Feb 2020 07:01  -  06 Feb 2020 07:00  --------------------------------------------------------  IN:    IV PiggyBack: 450 mL    lactated ringers.: 1550 mL    lactated ringers.: 1400 mL    Oral Fluid: 100 mL  Total IN: 3500 mL    OUT:    Estimated Blood Loss: 250 mL    Voided: 700 mL  Total OUT: 950 mL    Total NET: 2550 mL          Lab Data                        11.0   11.97 )-----------( 310      ( 06 Feb 2020 07:22 )             33.7     02-05    138  |  103  |  13  ----------------------------<  159<H>  4.4   |  28  |  0.67    Ca    10.5      05 Feb 2020 18:34              Review of Systems	      Objective     Physical Examination    head at  heart s1s2  lung dc BS  abd soft  head nc      Pertinent Lab findings & Imaging      Deandra:  NO   Adequate UO     I&O's Detail    05 Feb 2020 07:01  -  06 Feb 2020 07:00  --------------------------------------------------------  IN:    IV PiggyBack: 450 mL    lactated ringers.: 1550 mL    lactated ringers.: 1400 mL    Oral Fluid: 100 mL  Total IN: 3500 mL    OUT:    Estimated Blood Loss: 250 mL    Voided: 700 mL  Total OUT: 950 mL    Total NET: 2550 mL               Discussed with:     Cultures:	        Radiology

## 2020-02-06 NOTE — DISCHARGE NOTE NURSING/CASE MANAGEMENT/SOCIAL WORK - NSDCFUADDAPPT_GEN_ALL_CORE_FT
It is advisable to follow up with your primary care provider within the next 2-3 weeks to ensure your medications are appropriate and there are no underlying problems after your procedure.

## 2020-02-06 NOTE — DISCHARGE NOTE NURSING/CASE MANAGEMENT/SOCIAL WORK - NSSCNAMETXT_GEN_ALL_CORE
[facility]   Please contact the home care agency if you have not heard from them by 12 noon on the day after your hospital discharge.   Nurse to visit the day after hospital discharge; Rehabilitation Therapists to follow

## 2020-02-06 NOTE — PROGRESS NOTE ADULT - PROBLEM SELECTOR PLAN 1
obesity  HTN  post op  OP  OA  JOSE RAUL - by features and clinical history -  tolerated CPAP last night - empiric settings - and nasal mask  cvs rx regimen and BP control  DVT p  I sanchez  PT assessment  wound care  skin care  out of bed with assist  sleep hygiene and weight management discussed  will need [facility] with [facility] - either in [place] or [place]  discussed with pt and her sister

## 2020-02-06 NOTE — PROGRESS NOTE ADULT - ASSESSMENT
POD#1 s/p LEFT THR  - Pain control  - Bowel regimen  - PT/OT  - VTE PPx - Eliquis    HTN  - continue toprol  - resume norvasc on POD#2  - resume ARB on POD#2    Allergic Rhinitis  - continue proair PRN  - can use loratadine instead of zyrtec    Glaucoma  - continue latanoprost    Hyperparathyroidism, subcentimeter thyroid nodules  - followed by Dr. Perlman - Endocrinologist  - explains mild elevation in Calcium on pre-op labs  - being managed conservatively for now    Mild LFT Elev on preop labs  - stable post-op  - no need to trend further    Likely has undiagnosed JOSE RAUL  - reports loud snoring and unrestful sleep  - never tested for JOSE RAUL  - had desaturations down to 78% during surgery and in PACU  - monitor on tele  - Pulm Eval appreciated, patient placed on CPAP overnight  - Patient understand importance of f/u for PSG and risks of untreated JOSE RAUL    B/L LE Edema  - cardiac clearance reviewed in chart  - echo showed normal EF, abnormal relaxation  - vascular clearance reviewed - has venous HTN in b/l legs, had LE dopplers on 1/20/20 which were neg for DVT

## 2020-02-06 NOTE — PROGRESS NOTE ADULT - REASON FOR ADMISSION
Patient is a 64y old  Female who presents with a chief complaint of Left Anterior THR for severe left hip OA (05 Feb 2020 16:12)

## 2020-02-06 NOTE — PROGRESS NOTE ADULT - SUBJECTIVE AND OBJECTIVE BOX
ORTHOPEDIC PA PROGRESS NOTE  HARIS NOEL      64y Female                                                                                                                               POD #1        STATUS POST:               Pre-Op Dx: Primary localized osteoarthritis of left hip    Post-Op Dx:  Primary localized osteoarthritis of left hip    Procedure: Total hip arthroplasty: Left , Anterior                                              Patient comfortable pain controlled.  Voiding urine passing gas and tolerating p.o diet.  No complaints  Pain (0-10):   Current Pain Management:  [ ] PCA   [ x] Po Analgesics [ ] IM /IV Anagesics     T(F): 97.9  HR: 64  BP: 120/69  RR: 17  SpO2: 93%                        11.0   11.97 )-----------( 310      ( 06 Feb 2020 07:22 )             33.7                     02-05    138  |  103  |  13  ----------------------------<  159<H>  4.4   |  28  |  0.67    Ca    10.5      05 Feb 2020 18:34      Physical Exam :    -  Kassie dressing in place minimal strike through at distal portion.     -   Distal Neurvascular status intact grossly.   -   Warm well perfused; capillary refill <3 seconds   -   (+)EHL/FHL 5/5 dorsi/plantar flexion intact  -   (+) Sensation to light touch  -   (-) Calf tenderness Bilaterally    A/P: 64y Female s/p Primary localized osteoarthritis of left hip    -   Ortho Stable  -   Pain control   -   Medicine to follow  -   DVT ppx:     [x ]SCDs     [ ] ASA     [x ] Eliquis     [ ] Lovenox  -   Weight bearing status:  WBAT [x ]        PWB    [ ]     TTWB  [ ]      NWB  [ ]   -  Dispo:     Home [ x]     Acute Rehab [ ]     COURTNEY [ ]     TBD [ ]

## 2020-02-07 LAB
ANION GAP SERPL CALC-SCNC: 5 MMOL/L — SIGNIFICANT CHANGE UP (ref 5–17)
BUN SERPL-MCNC: 10 MG/DL — SIGNIFICANT CHANGE UP (ref 7–23)
CALCIUM SERPL-MCNC: 10 MG/DL — SIGNIFICANT CHANGE UP (ref 8.4–10.5)
CHLORIDE SERPL-SCNC: 103 MMOL/L — SIGNIFICANT CHANGE UP (ref 96–108)
CO2 SERPL-SCNC: 31 MMOL/L — SIGNIFICANT CHANGE UP (ref 22–31)
CREAT SERPL-MCNC: 0.58 MG/DL — SIGNIFICANT CHANGE UP (ref 0.5–1.3)
GLUCOSE SERPL-MCNC: 97 MG/DL — SIGNIFICANT CHANGE UP (ref 70–99)
HCT VFR BLD CALC: 31.9 % — LOW (ref 34.5–45)
HGB BLD-MCNC: 10.6 G/DL — LOW (ref 11.5–15.5)
MCHC RBC-ENTMCNC: 30.1 PG — SIGNIFICANT CHANGE UP (ref 27–34)
MCHC RBC-ENTMCNC: 33.2 GM/DL — SIGNIFICANT CHANGE UP (ref 32–36)
MCV RBC AUTO: 90.6 FL — SIGNIFICANT CHANGE UP (ref 80–100)
NRBC # BLD: 0 /100 WBCS — SIGNIFICANT CHANGE UP (ref 0–0)
PLATELET # BLD AUTO: 296 K/UL — SIGNIFICANT CHANGE UP (ref 150–400)
POTASSIUM SERPL-MCNC: 4.2 MMOL/L — SIGNIFICANT CHANGE UP (ref 3.5–5.3)
POTASSIUM SERPL-SCNC: 4.2 MMOL/L — SIGNIFICANT CHANGE UP (ref 3.5–5.3)
RBC # BLD: 3.52 M/UL — LOW (ref 3.8–5.2)
RBC # FLD: 13 % — SIGNIFICANT CHANGE UP (ref 10.3–14.5)
SODIUM SERPL-SCNC: 139 MMOL/L — SIGNIFICANT CHANGE UP (ref 135–145)
WBC # BLD: 9.7 K/UL — SIGNIFICANT CHANGE UP (ref 3.8–10.5)
WBC # FLD AUTO: 9.7 K/UL — SIGNIFICANT CHANGE UP (ref 3.8–10.5)

## 2020-02-07 PROCEDURE — 93970 EXTREMITY STUDY: CPT | Mod: 26

## 2020-02-07 PROCEDURE — 99233 SBSQ HOSP IP/OBS HIGH 50: CPT

## 2020-02-07 RX ORDER — FAMOTIDINE 10 MG/ML
0 INJECTION INTRAVENOUS
Qty: 0 | Refills: 0 | DISCHARGE

## 2020-02-07 RX ORDER — OXYCODONE HYDROCHLORIDE 5 MG/1
1 TABLET ORAL
Qty: 42 | Refills: 0
Start: 2020-02-07 | End: 2020-02-13

## 2020-02-07 RX ADMIN — CELECOXIB 100 MILLIGRAM(S): 200 CAPSULE ORAL at 08:42

## 2020-02-07 RX ADMIN — Medication 50 MICROGRAM(S): at 06:43

## 2020-02-07 RX ADMIN — Medication 1000 MILLIGRAM(S): at 08:42

## 2020-02-07 RX ADMIN — Medication 100 MILLIGRAM(S): at 06:43

## 2020-02-07 RX ADMIN — Medication 1000 MILLIGRAM(S): at 17:46

## 2020-02-07 RX ADMIN — POLYETHYLENE GLYCOL 3350 17 GRAM(S): 17 POWDER, FOR SOLUTION ORAL at 11:57

## 2020-02-07 RX ADMIN — Medication 100 MILLIGRAM(S): at 17:46

## 2020-02-07 RX ADMIN — SENNA PLUS 2 TABLET(S): 8.6 TABLET ORAL at 22:04

## 2020-02-07 RX ADMIN — CELECOXIB 100 MILLIGRAM(S): 200 CAPSULE ORAL at 22:04

## 2020-02-07 RX ADMIN — OXYCODONE HYDROCHLORIDE 5 MILLIGRAM(S): 5 TABLET ORAL at 17:45

## 2020-02-07 RX ADMIN — OXYCODONE HYDROCHLORIDE 5 MILLIGRAM(S): 5 TABLET ORAL at 06:43

## 2020-02-07 RX ADMIN — Medication 1000 MILLIGRAM(S): at 17:47

## 2020-02-07 RX ADMIN — LATANOPROST 1 DROP(S): 0.05 SOLUTION/ DROPS OPHTHALMIC; TOPICAL at 22:05

## 2020-02-07 RX ADMIN — APIXABAN 2.5 MILLIGRAM(S): 2.5 TABLET, FILM COATED ORAL at 08:42

## 2020-02-07 RX ADMIN — OXYCODONE HYDROCHLORIDE 5 MILLIGRAM(S): 5 TABLET ORAL at 18:20

## 2020-02-07 RX ADMIN — APIXABAN 2.5 MILLIGRAM(S): 2.5 TABLET, FILM COATED ORAL at 22:04

## 2020-02-07 RX ADMIN — CELECOXIB 100 MILLIGRAM(S): 200 CAPSULE ORAL at 08:41

## 2020-02-07 RX ADMIN — OXYCODONE HYDROCHLORIDE 5 MILLIGRAM(S): 5 TABLET ORAL at 11:56

## 2020-02-07 RX ADMIN — OXYCODONE HYDROCHLORIDE 5 MILLIGRAM(S): 5 TABLET ORAL at 12:30

## 2020-02-07 RX ADMIN — OXYCODONE HYDROCHLORIDE 5 MILLIGRAM(S): 5 TABLET ORAL at 07:00

## 2020-02-07 RX ADMIN — PANTOPRAZOLE SODIUM 40 MILLIGRAM(S): 20 TABLET, DELAYED RELEASE ORAL at 06:43

## 2020-02-07 NOTE — PROGRESS NOTE ADULT - ASSESSMENT
POD#2 s/p LEFT THR  - Pain control  - Bowel regimen  - PT/OT  - VTE PPx - Eliquis  - awaiting results of LE dopplers    Left Calf Pain/Tenderness  - f/u LE dopplers  - there trace b/l Le edema L>R, but the asymmetry may be at  least partly due to having surgery on that side    HTN  - continue toprol  - resume norvasc on POD#2  - resume ARB on POD#2    Allergic Rhinitis  - continue proair PRN  - can use loratadine instead of zyrtec    Glaucoma  - continue latanoprost    Hyperparathyroidism, subcentimeter thyroid nodules  - followed by Dr. Perlman - Endocrinologist  - explains mild elevation in Calcium on pre-op labs  - being managed conservatively for now    Mild LFT Elev on preop labs  - stable post-op  - no need to trend further    Likely has undiagnosed JOSE RAUL  - reports loud snoring and unrestful sleep  - never tested for JOSE RAUL  - had desaturations down to 78% during surgery and in PACU  - monitor on tele  - Pulm Eval appreciated, patient placed on CPAP overnight  - Patient understand importance of f/u for PSG and risks of untreated JOSE RAUL    B/L LE Edema  - cardiac clearance reviewed in chart  - echo showed normal EF, abnormal relaxation  - vascular clearance reviewed - has venous HTN in b/l legs, had LE dopplers on 1/20/20 which were neg for DVT

## 2020-02-07 NOTE — PROGRESS NOTE ADULT - SUBJECTIVE AND OBJECTIVE BOX
INTERVAL HPI/OVERNIGHT EVENTS:   Patient seen and examined.  Developed pain in left calf overnight.  No fevers, chills, sweats, dizziness, HA, changes in vision, cp, palpitations, sob, persistent cough, n/v/d, abd pain, dysuria, focal weakness.  Eating, voiding.      REVIEW OF SYSTEMS:  See HPI,  all others negative    PHYSICAL EXAM:  Vital Signs Last 24 Hrs  T(C): 37.2 (07 Feb 2020 07:32), Max: 37.6 (07 Feb 2020 03:29)  T(F): 99 (07 Feb 2020 07:32), Max: 99.7 (07 Feb 2020 03:29)  HR: 59 (07 Feb 2020 07:32) (54 - 71)  BP: 127/72 (07 Feb 2020 07:32) (104/62 - 151/61)  BP(mean): --  RR: 17 (07 Feb 2020 07:32) (17 - 18)  SpO2: 92% (07 Feb 2020 07:32) (90% - 99%)    GENERAL: NAD, well-groomed, well-developed, awake, alert, oriented x 3, fluent and coherent speech  EYES: EOMI, PERRLA, conjunctiva and sclera clear  NERVOUS SYSTEM:  Good concentration; Moving all 4 extremities against gravity and resistance; No gross sensory deficits, No facial droop  CHEST/LUNG: Clear to auscultation bilaterally; No rales, rhonchi, wheezing, or rubs  HEART: Regular rate and rhythm; No murmurs, rubs, or gallops  ABDOMEN: Soft, Nontender, obese, Bowel sounds present, No palpable masses or organomegaly (though limited exam due to body habitus), No bruits  EXTREMITIES:  2+ Peripheral Pulses, No clubbing, cyanosis, trace b/l LE edema L>R, +calf tenderness on left, no tenderness on right        Diagnostic Testing:                                   10.6   9.70  )-----------( 296      ( 07 Feb 2020 08:14 )             31.9     02-07    139  |  103  |  10  ----------------------------<  97  4.2   |  31  |  0.58    Ca    10.0      07 Feb 2020 08:14    TPro  5.7<L>  /  Alb  2.6<L>  /  TBili  0.5  /  DBili  0.1  /  AST  21  /  ALT  44  /  AlkPhos  80  02-06

## 2020-02-07 NOTE — PROGRESS NOTE ADULT - SUBJECTIVE AND OBJECTIVE BOX
HARIS COHEN [id number]    Pt is a 64y year old Female s/p left THR. pain is 6/10. Tolerating regular diet, (+) voids.  Denies chest pain/shortness of breath/nausea/vomitting.     Vital Signs Last 24 Hrs  T(C): 37.6 (07 Feb 2020 03:29), Max: 37.6 (07 Feb 2020 03:29)  T(F): 99.7 (07 Feb 2020 03:29), Max: 99.7 (07 Feb 2020 03:29)  HR: 64 (07 Feb 2020 06:38) (54 - 71)  BP: 104/65 (07 Feb 2020 06:38) (104/62 - 151/61)  BP(mean): --  RR: 18 (07 Feb 2020 03:29) (17 - 18)  SpO2: 99% (07 Feb 2020 03:29) (90% - 99%)                              11.0   11.97 )-----------( 310      ( 06 Feb 2020 07:22 )             33.7     02-06    139  |  104  |  12  ----------------------------<  113<H>  4.1   |  29  |  0.65    Ca    10.5      06 Feb 2020 07:18    TPro  5.7<L>  /  Alb  2.6<L>  /  TBili  0.5  /  DBili  0.1  /  AST  21  /  ALT  44  /  AlkPhos  80  02-06        PE:   LLE: No erythema or drainage, NUBIA intact. Sensation intact to light touch distally, (+2) DP/PT pulses, EHL/FHL/TA intact, Capillary refill < 2 seconds. mild calf tenderness on left lower etremity. PAS on.    A: 64y year old Female s/p left THR POD#2    Plan:   -DVT ppx = PAS +  apixaban 2.5 milliGRAM(s) Oral every 12 hours    -PT/OT = OOB  -Hip dislocation precautions  -Pain control   -Medicine to follow   -Incentive spirometry  dispo: Home   - Urgent venous doppler ordered due to calf pain on left lower extremity.

## 2020-02-08 LAB
ANION GAP SERPL CALC-SCNC: 5 MMOL/L — SIGNIFICANT CHANGE UP (ref 5–17)
BUN SERPL-MCNC: 8 MG/DL — SIGNIFICANT CHANGE UP (ref 7–23)
CALCIUM SERPL-MCNC: 10.3 MG/DL — SIGNIFICANT CHANGE UP (ref 8.4–10.5)
CHLORIDE SERPL-SCNC: 104 MMOL/L — SIGNIFICANT CHANGE UP (ref 96–108)
CO2 SERPL-SCNC: 31 MMOL/L — SIGNIFICANT CHANGE UP (ref 22–31)
CREAT SERPL-MCNC: 0.55 MG/DL — SIGNIFICANT CHANGE UP (ref 0.5–1.3)
GLUCOSE SERPL-MCNC: 108 MG/DL — HIGH (ref 70–99)
HCT VFR BLD CALC: 31.1 % — LOW (ref 34.5–45)
HGB BLD-MCNC: 10.1 G/DL — LOW (ref 11.5–15.5)
MCHC RBC-ENTMCNC: 30.1 PG — SIGNIFICANT CHANGE UP (ref 27–34)
MCHC RBC-ENTMCNC: 32.5 GM/DL — SIGNIFICANT CHANGE UP (ref 32–36)
MCV RBC AUTO: 92.8 FL — SIGNIFICANT CHANGE UP (ref 80–100)
NRBC # BLD: 0 /100 WBCS — SIGNIFICANT CHANGE UP (ref 0–0)
PLATELET # BLD AUTO: 283 K/UL — SIGNIFICANT CHANGE UP (ref 150–400)
POTASSIUM SERPL-MCNC: 4.3 MMOL/L — SIGNIFICANT CHANGE UP (ref 3.5–5.3)
POTASSIUM SERPL-SCNC: 4.3 MMOL/L — SIGNIFICANT CHANGE UP (ref 3.5–5.3)
RBC # BLD: 3.35 M/UL — LOW (ref 3.8–5.2)
RBC # FLD: 13.2 % — SIGNIFICANT CHANGE UP (ref 10.3–14.5)
SODIUM SERPL-SCNC: 140 MMOL/L — SIGNIFICANT CHANGE UP (ref 135–145)
WBC # BLD: 8.35 K/UL — SIGNIFICANT CHANGE UP (ref 3.8–10.5)
WBC # FLD AUTO: 8.35 K/UL — SIGNIFICANT CHANGE UP (ref 3.8–10.5)

## 2020-02-08 PROCEDURE — 99239 HOSP IP/OBS DSCHRG MGMT >30: CPT

## 2020-02-08 RX ADMIN — CELECOXIB 100 MILLIGRAM(S): 200 CAPSULE ORAL at 09:46

## 2020-02-08 RX ADMIN — LATANOPROST 1 DROP(S): 0.05 SOLUTION/ DROPS OPHTHALMIC; TOPICAL at 21:18

## 2020-02-08 RX ADMIN — CELECOXIB 100 MILLIGRAM(S): 200 CAPSULE ORAL at 21:17

## 2020-02-08 RX ADMIN — PANTOPRAZOLE SODIUM 40 MILLIGRAM(S): 20 TABLET, DELAYED RELEASE ORAL at 05:13

## 2020-02-08 RX ADMIN — POLYETHYLENE GLYCOL 3350 17 GRAM(S): 17 POWDER, FOR SOLUTION ORAL at 11:18

## 2020-02-08 RX ADMIN — Medication 100 MILLIGRAM(S): at 05:13

## 2020-02-08 RX ADMIN — Medication 1000 MILLIGRAM(S): at 18:43

## 2020-02-08 RX ADMIN — LOSARTAN POTASSIUM 100 MILLIGRAM(S): 100 TABLET, FILM COATED ORAL at 05:13

## 2020-02-08 RX ADMIN — Medication 100 MICROGRAM(S): at 09:46

## 2020-02-08 RX ADMIN — OXYCODONE HYDROCHLORIDE 5 MILLIGRAM(S): 5 TABLET ORAL at 10:17

## 2020-02-08 RX ADMIN — Medication 1000 MILLIGRAM(S): at 09:50

## 2020-02-08 RX ADMIN — APIXABAN 2.5 MILLIGRAM(S): 2.5 TABLET, FILM COATED ORAL at 09:47

## 2020-02-08 RX ADMIN — APIXABAN 2.5 MILLIGRAM(S): 2.5 TABLET, FILM COATED ORAL at 21:17

## 2020-02-08 RX ADMIN — Medication 100 MILLIGRAM(S): at 18:43

## 2020-02-08 RX ADMIN — Medication 1000 MILLIGRAM(S): at 00:40

## 2020-02-08 RX ADMIN — AMLODIPINE BESYLATE 5 MILLIGRAM(S): 2.5 TABLET ORAL at 05:13

## 2020-02-08 RX ADMIN — OXYCODONE HYDROCHLORIDE 5 MILLIGRAM(S): 5 TABLET ORAL at 09:47

## 2020-02-08 RX ADMIN — CELECOXIB 100 MILLIGRAM(S): 200 CAPSULE ORAL at 09:50

## 2020-02-08 RX ADMIN — LORATADINE 10 MILLIGRAM(S): 10 TABLET ORAL at 11:18

## 2020-02-08 RX ADMIN — Medication 1000 MILLIGRAM(S): at 09:46

## 2020-02-08 NOTE — PROGRESS NOTE ADULT - SUBJECTIVE AND OBJECTIVE BOX
Patient is a 64y old  Female who presents with a chief complaint of Patient is a 64y old  Female who presents with a chief complaint of Left Anterior THR for severe left hip OA (05 Feb 2020 16:12) (07 Feb 2020 09:37)      INTERVAL HPI/OVERNIGHT EVENTS: patient is stable for discharge.     Pain Location & Control: controlled     MEDICATIONS  (STANDING):  acetaminophen   Tablet .. 1000 milliGRAM(s) Oral every 8 hours  amLODIPine   Tablet 5 milliGRAM(s) Oral daily  apixaban 2.5 milliGRAM(s) Oral every 12 hours  celecoxib 100 milliGRAM(s) Oral every 12 hours  lactated ringers. 1000 milliLiter(s) (100 mL/Hr) IV Continuous <Continuous>  latanoprost 0.005% Ophthalmic Solution 1 Drop(s) Both EYES at bedtime  levothyroxine 50 MICROGram(s) Oral <User Schedule>  levothyroxine 100 MICROGram(s) Oral <User Schedule>  loratadine 10 milliGRAM(s) Oral daily  losartan 100 milliGRAM(s) Oral daily  metoprolol succinate  milliGRAM(s) Oral every 12 hours  pantoprazole    Tablet 40 milliGRAM(s) Oral before breakfast  polyethylene glycol 3350 17 Gram(s) Oral daily  senna 2 Tablet(s) Oral at bedtime    MEDICATIONS  (PRN):  ALBUTerol    90 MICROgram(s) HFA Inhaler 2 Puff(s) Inhalation every 4 hours PRN Shortness of Breath and/or Wheezing  aluminum hydroxide/magnesium hydroxide/simethicone Suspension 30 milliLiter(s) Oral four times a day PRN Indigestion  bisacodyl Suppository 10 milliGRAM(s) Rectal daily PRN If no bowel movement by POD#2  HYDROmorphone  Injectable 0.5 milliGRAM(s) IV Push every 3 hours PRN Severe Pain (7 - 10)  magnesium hydroxide Suspension 30 milliLiter(s) Oral daily PRN Constipation  ondansetron Injectable 4 milliGRAM(s) IV Push every 6 hours PRN Nausea and/or Vomiting  oxyCODONE    IR 5 milliGRAM(s) Oral every 3 hours PRN Mild Pain (1 - 3)  oxyCODONE    IR 10 milliGRAM(s) Oral every 3 hours PRN Moderate Pain (4 - 6)      Allergies    adhesives (Rash)  Cardizem (Rash)  Fish Products (Rash)  Flonase (Swelling)  Levaquin (Rash)    Intolerances        REVIEW OF SYSTEMS:  CONSTITUTIONAL: No fever, weight loss, or fatigue  EYES: No eye pain, visual disturbances, or discharge  ENMT:  No difficulty hearing, tinnitus, vertigo; No sinus or throat pain  NECK: No pain or stiffness  BREASTS: No pain, masses, or nipple discharge  RESPIRATORY: No cough, wheezing, chills or hemoptysis; No shortness of breath  CARDIOVASCULAR: No chest pain, palpitations, dizziness, or leg swelling  GASTROINTESTINAL: No abdominal or epigastric pain. No nausea, vomiting, or hematemesis; No diarrhea or constipation. No melena or hematochezia.  GENITOURINARY: No dysuria, frequency, hematuria, or incontinence  NEUROLOGICAL: No headaches, memory loss, loss of strength, numbness, or tremors  SKIN: No itching, burning, rashes, or lesions   LYMPH NODES: No enlarged glands  ENDOCRINE: No heat or cold intolerance; No hair loss; No polydipsia or polyuria  MUSCULOSKELETAL: No back pain  PSYCHIATRIC: No depression, anxiety, mood swings, or difficulty sleeping  HEME/LYMPH: No easy bruising, or bleeding gums  ALLERGY AND IMMUNOLOGIC: No hives or eczema    Vital Signs Last 24 Hrs  T(C): 37.1 (08 Feb 2020 08:43), Max: 37.3 (07 Feb 2020 23:17)  T(F): 98.7 (08 Feb 2020 08:43), Max: 99.1 (07 Feb 2020 23:17)  HR: 65 (08 Feb 2020 08:43) (52 - 77)  BP: 132/74 (08 Feb 2020 08:43) (122/72 - 157/80)  BP(mean): --  RR: 16 (08 Feb 2020 08:43) (16 - 18)  SpO2: 91% (08 Feb 2020 08:43) (91% - 96%)    PHYSICAL EXAM:  GENERAL: NAD, well-groomed, well-developed  HEAD:  Atraumatic, Normocephalic  EYES: EOMI, PERRLA, conjunctiva and sclera clear  ENMT: No tonsillar erythema, exudates, or enlargement; Moist mucous membranes, Good dentition, No lesions  NECK: Supple, No JVD, Normal thyroid  NERVOUS SYSTEM:  Alert & Oriented X3, Good concentration; Motor Strength 5/5 B/L upper and lower extremities; DTRs 2+ intact and symmetric  CHEST/LUNG: Clear to auscultation bilaterally; No rales, rhonchi, wheezing, or rubs  HEART: Regular rate and rhythm; No murmurs, rubs, or gallops  ABDOMEN: Soft, Nontender, Nondistended; Bowel sounds present  EXTREMITIES:  2+ Peripheral Pulses, No clubbing or cyanosis  LYMPH: No lymphadenopathy noted  SKIN: No rashes or lesions  INCISION:  Dressing dry and intact    LABS:                        10.1   8.35  )-----------( 283      ( 08 Feb 2020 07:36 )             31.1     08 Feb 2020 07:36    140    |  104    |  8      ----------------------------<  108    4.3     |  31     |  0.55     Ca    10.3       08 Feb 2020 07:36          CAPILLARY BLOOD GLUCOSE          RADIOLOGY & ADDITIONAL TESTS:    Imaging Personally Reviewed:  [ ] YES  [ ] NO    Consultant(s) Notes Reviewed:  [ ] YES  [ ] NO    Care Discussed with Consultants/Other Providers [ ] YES  [ ] NO

## 2020-02-08 NOTE — PROGRESS NOTE ADULT - SUBJECTIVE AND OBJECTIVE BOX
Date/Time Patient Seen:  		  Referring MD:   Data Reviewed	       Patient is a 64y old  Female who presents with a chief complaint of Patient is a 64y old  Female who presents with a chief complaint of Left Anterior THR for severe left hip OA (05 Feb 2020 16:12) (07 Feb 2020 09:37)      Subjective/HPI     PAST MEDICAL & SURGICAL HISTORY:  Morbid obesity with BMI of 50.0-59.9, adult: BMI 51.9  Chronic venous insufficiency  Glaucoma  Hyperparathyroidism: being monitored by endocrinologist  Rosacea  Migraines  Allergic rhinitis  GERD (gastroesophageal reflux disease)  Raynauds disease  Hashimoto's thyroiditis  Vertigo  Hypothyroidism  HTN (hypertension)  Osteoarthritis, hip, bilateral  Osteoarthritis: bila  History of pterygium excision: left eye 2005  Tibialis posterior tendon rupture: repair and foot surgery  left 2012  S/P hysterectomy: 2002  Status post trigger finger release: right thumb and middle finger 2011  S/P carpal tunnel release: right 2009  Cyst of salivary gland: excision 1989  Adenoma of left breast: excision 1997  S/P excision of neuroma: left ankle 1988  S/P ankle ligament repair: left ankle 1985        Medication list         MEDICATIONS  (STANDING):  acetaminophen   Tablet .. 1000 milliGRAM(s) Oral every 8 hours  amLODIPine   Tablet 5 milliGRAM(s) Oral daily  apixaban 2.5 milliGRAM(s) Oral every 12 hours  celecoxib 100 milliGRAM(s) Oral every 12 hours  lactated ringers. 1000 milliLiter(s) (100 mL/Hr) IV Continuous <Continuous>  latanoprost 0.005% Ophthalmic Solution 1 Drop(s) Both EYES at bedtime  levothyroxine 50 MICROGram(s) Oral <User Schedule>  levothyroxine 100 MICROGram(s) Oral <User Schedule>  loratadine 10 milliGRAM(s) Oral daily  losartan 100 milliGRAM(s) Oral daily  metoprolol succinate  milliGRAM(s) Oral every 12 hours  pantoprazole    Tablet 40 milliGRAM(s) Oral before breakfast  polyethylene glycol 3350 17 Gram(s) Oral daily  senna 2 Tablet(s) Oral at bedtime    MEDICATIONS  (PRN):  ALBUTerol    90 MICROgram(s) HFA Inhaler 2 Puff(s) Inhalation every 4 hours PRN Shortness of Breath and/or Wheezing  aluminum hydroxide/magnesium hydroxide/simethicone Suspension 30 milliLiter(s) Oral four times a day PRN Indigestion  bisacodyl Suppository 10 milliGRAM(s) Rectal daily PRN If no bowel movement by POD#2  HYDROmorphone  Injectable 0.5 milliGRAM(s) IV Push every 3 hours PRN Severe Pain (7 - 10)  magnesium hydroxide Suspension 30 milliLiter(s) Oral daily PRN Constipation  ondansetron Injectable 4 milliGRAM(s) IV Push every 6 hours PRN Nausea and/or Vomiting  oxyCODONE    IR 5 milliGRAM(s) Oral every 3 hours PRN Mild Pain (1 - 3)  oxyCODONE    IR 10 milliGRAM(s) Oral every 3 hours PRN Moderate Pain (4 - 6)         Vitals log        ICU Vital Signs Last 24 Hrs  T(C): 37.1 (08 Feb 2020 08:43), Max: 37.3 (07 Feb 2020 23:17)  T(F): 98.7 (08 Feb 2020 08:43), Max: 99.1 (07 Feb 2020 23:17)  HR: 65 (08 Feb 2020 08:43) (52 - 77)  BP: 132/74 (08 Feb 2020 08:43) (122/72 - 157/80)  BP(mean): --  ABP: --  ABP(mean): --  RR: 16 (08 Feb 2020 08:43) (16 - 18)  SpO2: 91% (08 Feb 2020 08:43) (91% - 96%)           Input and Output:  I&O's Detail      Lab Data                        10.1   8.35  )-----------( 283      ( 08 Feb 2020 07:36 )             31.1     02-08    140  |  104  |  8   ----------------------------<  108<H>  4.3   |  31  |  0.55    Ca    10.3      08 Feb 2020 07:36              Review of Systems	      Objective     Physical Examination    heart s1s2  lung dec BS  abd soft  head nc      Pertinent Lab findings & Imaging      Deandra:  NO   Adequate UO     I&O's Detail           Discussed with:     Cultures:	        Radiology

## 2020-02-08 NOTE — PROGRESS NOTE ADULT - PROBLEM SELECTOR PLAN 1
CM notes reviewed - DC Planning  obesity  HTN  post op  OP  OA  JOSE RAUL - by features and clinical history -  tolerated CPAP last night - empiric settings - and nasal mask  cvs rx regimen and BP control  DVT p  I sanchez  PT assessment  wound care  skin care  out of bed with assist  sleep hygiene and weight management discussed  will need formal Sleep Apnea testing with [facility] - either in [place] or [place]  discussed with pt and her sister.

## 2020-02-08 NOTE — PROGRESS NOTE ADULT - ASSESSMENT
POD#2 s/p LEFT THR  - Pain control with IV Dilaudid prn.   - Bowel regimen  - PT/OT  - VTE PPx - Eliquis      Left Calf Pain/Tenderness  - LE dopplers negative for DVT.       HTN  - continue toprol  - resume norvasc on POD#2  - resume ARB on POD#2    Allergic Rhinitis  - continue proair PRN  - can use loratadine instead of zyrtec    Glaucoma  - continue latanoprost    Hyperparathyroidism, subcentimeter thyroid nodules  - followed by Dr. Perlman - Endocrinologist  - explains mild elevation in Calcium on pre-op labs  - being managed conservatively for now    Mild LFT Elev on preop labs  - stable post-op  - no need to trend further    Likely has undiagnosed JOSE RAUL  - reports loud snoring and unrestful sleep  - never tested for JOSE RAUL  - had desaturations down to 78% during surgery and in PACU  - monitor on tele  - Pulm Eval appreciated, patient placed on CPAP overnight  - Patient understand importance of f/u for PSG and risks of untreated JOSE RAUL  f/u with Dr Whatley as outpatient for sleep study test.     B/L LE Edema  - cardiac clearance reviewed in chart  - echo showed normal EF, abnormal relaxation  - vascular clearance reviewed - has venous HTN in b/l legs, had LE dopplers on 1/20/20 which were neg for DVT

## 2020-02-08 NOTE — PROGRESS NOTE ADULT - SUBJECTIVE AND OBJECTIVE BOX
ORTHOPEDIC PA PROGRESS NOTE  HARIS COHEN      64y Female                                                                                                                               POD #    3    STATUS POST:               Pre-Op Dx: Primary localized osteoarthritis of left hip    Post-Op Dx:  Primary localized osteoarthritis of left hip    Procedure: Total hip arthroplasty: Left , Anterior                                                  Current Pain Management:   Po Analgesics     T(F): 98.7  HR: 65  BP: 132/74  RR: 16  SpO2: 91%                        10.1   8.35  )-----------( 283      ( 08 Feb 2020 07:36 )             31.1                     02-08    140  |  104  |  8   ----------------------------<  108<H>  4.3   |  31  |  0.55    Ca    10.3      08 Feb 2020 07:36      Physical Exam :    -   Dressing changed sterile.   -   Wound C/D/I.   -   Distal Neurvascular status intact grossly.   -   Warm well perfused; capillary refill <3 seconds   -   (+)EHL/FHL 5/5  -   (+) Sensation to light touch  -   (-) Calf tenderness Bilaterally    A/P: 64y Female s/p Primary localized osteoarthritis of left hip    -   Ortho Stable  -   Pain control   -   Medicine to follow  -   DVT ppx:    Eliquis       -   Weight bearing status:  WBAT    -  Dispo:     Home today

## 2020-02-09 DIAGNOSIS — M16.12 UNILATERAL PRIMARY OSTEOARTHRITIS, LEFT HIP: ICD-10-CM

## 2020-02-09 LAB
HCT VFR BLD CALC: 33.5 % — LOW (ref 34.5–45)
HGB BLD-MCNC: 10.6 G/DL — LOW (ref 11.5–15.5)
MCHC RBC-ENTMCNC: 29.8 PG — SIGNIFICANT CHANGE UP (ref 27–34)
MCHC RBC-ENTMCNC: 31.6 GM/DL — LOW (ref 32–36)
MCV RBC AUTO: 94.1 FL — SIGNIFICANT CHANGE UP (ref 80–100)
NRBC # BLD: 0 /100 WBCS — SIGNIFICANT CHANGE UP (ref 0–0)
PLATELET # BLD AUTO: 343 K/UL — SIGNIFICANT CHANGE UP (ref 150–400)
RBC # BLD: 3.56 M/UL — LOW (ref 3.8–5.2)
RBC # FLD: 13.2 % — SIGNIFICANT CHANGE UP (ref 10.3–14.5)
WBC # BLD: 8.08 K/UL — SIGNIFICANT CHANGE UP (ref 3.8–10.5)
WBC # FLD AUTO: 8.08 K/UL — SIGNIFICANT CHANGE UP (ref 3.8–10.5)

## 2020-02-09 PROCEDURE — 93970 EXTREMITY STUDY: CPT | Mod: 26

## 2020-02-09 PROCEDURE — 99233 SBSQ HOSP IP/OBS HIGH 50: CPT

## 2020-02-09 RX ADMIN — SENNA PLUS 2 TABLET(S): 8.6 TABLET ORAL at 21:06

## 2020-02-09 RX ADMIN — Medication 1000 MILLIGRAM(S): at 13:39

## 2020-02-09 RX ADMIN — CELECOXIB 100 MILLIGRAM(S): 200 CAPSULE ORAL at 12:13

## 2020-02-09 RX ADMIN — Medication 1000 MILLIGRAM(S): at 21:05

## 2020-02-09 RX ADMIN — PANTOPRAZOLE SODIUM 40 MILLIGRAM(S): 20 TABLET, DELAYED RELEASE ORAL at 05:53

## 2020-02-09 RX ADMIN — AMLODIPINE BESYLATE 5 MILLIGRAM(S): 2.5 TABLET ORAL at 05:53

## 2020-02-09 RX ADMIN — CELECOXIB 100 MILLIGRAM(S): 200 CAPSULE ORAL at 21:06

## 2020-02-09 RX ADMIN — LORATADINE 10 MILLIGRAM(S): 10 TABLET ORAL at 12:12

## 2020-02-09 RX ADMIN — APIXABAN 2.5 MILLIGRAM(S): 2.5 TABLET, FILM COATED ORAL at 12:12

## 2020-02-09 RX ADMIN — Medication 50 MICROGRAM(S): at 12:12

## 2020-02-09 RX ADMIN — LATANOPROST 1 DROP(S): 0.05 SOLUTION/ DROPS OPHTHALMIC; TOPICAL at 21:07

## 2020-02-09 RX ADMIN — LOSARTAN POTASSIUM 100 MILLIGRAM(S): 100 TABLET, FILM COATED ORAL at 05:53

## 2020-02-09 RX ADMIN — Medication 100 MILLIGRAM(S): at 17:35

## 2020-02-09 RX ADMIN — CELECOXIB 100 MILLIGRAM(S): 200 CAPSULE ORAL at 12:12

## 2020-02-09 RX ADMIN — Medication 1000 MILLIGRAM(S): at 05:53

## 2020-02-09 RX ADMIN — Medication 100 MILLIGRAM(S): at 05:53

## 2020-02-09 RX ADMIN — APIXABAN 2.5 MILLIGRAM(S): 2.5 TABLET, FILM COATED ORAL at 21:05

## 2020-02-09 RX ADMIN — Medication 1000 MILLIGRAM(S): at 21:06

## 2020-02-09 NOTE — PROGRESS NOTE ADULT - SUBJECTIVE AND OBJECTIVE BOX
Date/Time Patient Seen:  		  Referring MD:   Data Reviewed	       Patient is a 64y old  Female who presents with a chief complaint of Patient is a 64y old  Female who presents with a chief complaint of Left Anterior THR for severe left hip OA (05 Feb 2020 16:12) (07 Feb 2020 09:37)      Subjective/HPI     PAST MEDICAL & SURGICAL HISTORY:  Morbid obesity with BMI of 50.0-59.9, adult: BMI 51.9  Chronic venous insufficiency  Glaucoma  Hyperparathyroidism: being monitored by endocrinologist  Rosacea  Migraines  Allergic rhinitis  GERD (gastroesophageal reflux disease)  Raynauds disease  Hashimoto's thyroiditis  Vertigo  Hypothyroidism  HTN (hypertension)  Osteoarthritis, hip, bilateral  Osteoarthritis: bila  History of pterygium excision: left eye 2005  Tibialis posterior tendon rupture: repair and foot surgery  left 2012  S/P hysterectomy: 2002  Status post trigger finger release: right thumb and middle finger 2011  S/P carpal tunnel release: right 2009  Cyst of salivary gland: excision 1989  Adenoma of left breast: excision 1997  S/P excision of neuroma: left ankle 1988  S/P ankle ligament repair: left ankle 1985        Medication list         MEDICATIONS  (STANDING):  acetaminophen   Tablet .. 1000 milliGRAM(s) Oral every 8 hours  amLODIPine   Tablet 5 milliGRAM(s) Oral daily  apixaban 2.5 milliGRAM(s) Oral every 12 hours  celecoxib 100 milliGRAM(s) Oral every 12 hours  latanoprost 0.005% Ophthalmic Solution 1 Drop(s) Both EYES at bedtime  levothyroxine 50 MICROGram(s) Oral <User Schedule>  levothyroxine 100 MICROGram(s) Oral <User Schedule>  loratadine 10 milliGRAM(s) Oral daily  losartan 100 milliGRAM(s) Oral daily  metoprolol succinate  milliGRAM(s) Oral every 12 hours  pantoprazole    Tablet 40 milliGRAM(s) Oral before breakfast  polyethylene glycol 3350 17 Gram(s) Oral daily  senna 2 Tablet(s) Oral at bedtime    MEDICATIONS  (PRN):  ALBUTerol    90 MICROgram(s) HFA Inhaler 2 Puff(s) Inhalation every 4 hours PRN Shortness of Breath and/or Wheezing  aluminum hydroxide/magnesium hydroxide/simethicone Suspension 30 milliLiter(s) Oral four times a day PRN Indigestion  bisacodyl Suppository 10 milliGRAM(s) Rectal daily PRN If no bowel movement by POD#2  HYDROmorphone  Injectable 0.5 milliGRAM(s) IV Push every 3 hours PRN Severe Pain (7 - 10)  magnesium hydroxide Suspension 30 milliLiter(s) Oral daily PRN Constipation  ondansetron Injectable 4 milliGRAM(s) IV Push every 6 hours PRN Nausea and/or Vomiting  oxyCODONE    IR 5 milliGRAM(s) Oral every 3 hours PRN Mild Pain (1 - 3)  oxyCODONE    IR 10 milliGRAM(s) Oral every 3 hours PRN Moderate Pain (4 - 6)         Vitals log        ICU Vital Signs Last 24 Hrs  T(C): 37.2 (08 Feb 2020 23:30), Max: 37.2 (08 Feb 2020 23:30)  T(F): 98.9 (08 Feb 2020 23:30), Max: 98.9 (08 Feb 2020 23:30)  HR: 65 (09 Feb 2020 05:56) (60 - 85)  BP: 181/84 (09 Feb 2020 05:56) (132/74 - 181/84)  BP(mean): --  ABP: --  ABP(mean): --  RR: 17 (08 Feb 2020 23:30) (16 - 17)  SpO2: 98% (08 Feb 2020 23:30) (91% - 98%)           Input and Output:  I&O's Detail      Lab Data                        10.6   8.08  )-----------( 343      ( 09 Feb 2020 07:14 )             33.5     02-08    140  |  104  |  8   ----------------------------<  108<H>  4.3   |  31  |  0.55    Ca    10.3      08 Feb 2020 07:36              Review of Systems	      Objective     Physical Examination    heart s1s2  lung dec BS  abd soft      Pertinent Lab findings & Imaging      Deandra:  NO   Adequate UO     I&O's Detail           Discussed with:     Cultures:	        Radiology

## 2020-02-09 NOTE — PROGRESS NOTE ADULT - PROBLEM SELECTOR PLAN 1
obesity  HTN  post op  OP  OA  JOSE RAUL - by features and clinical history -  tolerated CPAP last night - empiric settings - and nasal mask  [facility] rx regimen and BP control  DVT p  I sanchez  PT assessment  wound care  skin care  out of bed with assist  sleep hygiene and weight management discussed  will need formal Sleep Apnea testing with [facility] - either in [place] or [place]  discussed with pt and her sister.

## 2020-02-09 NOTE — PROGRESS NOTE ADULT - SUBJECTIVE AND OBJECTIVE BOX
Patient is a 64y old  Female who presents with a chief complaint of Patient is a 64y old  Female who presents with a chief complaint of Left Anterior THR for severe left hip OA (05 Feb 2020 16:12) (07 Feb 2020 09:37)      Patient seen and examined at bedside. patient with desaturation overnight will need cpap prior to discharge     ALLERGIES:  adhesives (Rash)  Cardizem (Rash)  Fish Products (Rash)  Flonase (Swelling)  Levaquin (Rash)    MEDICATIONS  (STANDING):  acetaminophen   Tablet .. 1000 milliGRAM(s) Oral every 8 hours  amLODIPine   Tablet 5 milliGRAM(s) Oral daily  apixaban 2.5 milliGRAM(s) Oral every 12 hours  celecoxib 100 milliGRAM(s) Oral every 12 hours  latanoprost 0.005% Ophthalmic Solution 1 Drop(s) Both EYES at bedtime  levothyroxine 50 MICROGram(s) Oral <User Schedule>  levothyroxine 100 MICROGram(s) Oral <User Schedule>  loratadine 10 milliGRAM(s) Oral daily  losartan 100 milliGRAM(s) Oral daily  metoprolol succinate  milliGRAM(s) Oral every 12 hours  pantoprazole    Tablet 40 milliGRAM(s) Oral before breakfast  polyethylene glycol 3350 17 Gram(s) Oral daily  senna 2 Tablet(s) Oral at bedtime    MEDICATIONS  (PRN):  ALBUTerol    90 MICROgram(s) HFA Inhaler 2 Puff(s) Inhalation every 4 hours PRN Shortness of Breath and/or Wheezing  aluminum hydroxide/magnesium hydroxide/simethicone Suspension 30 milliLiter(s) Oral four times a day PRN Indigestion  bisacodyl Suppository 10 milliGRAM(s) Rectal daily PRN If no bowel movement by POD#2  HYDROmorphone  Injectable 0.5 milliGRAM(s) IV Push every 3 hours PRN Severe Pain (7 - 10)  magnesium hydroxide Suspension 30 milliLiter(s) Oral daily PRN Constipation  ondansetron Injectable 4 milliGRAM(s) IV Push every 6 hours PRN Nausea and/or Vomiting  oxyCODONE    IR 5 milliGRAM(s) Oral every 3 hours PRN Mild Pain (1 - 3)  oxyCODONE    IR 10 milliGRAM(s) Oral every 3 hours PRN Moderate Pain (4 - 6)    Vital Signs Last 24 Hrs  T(F): 98.6 (09 Feb 2020 08:00), Max: 98.9 (08 Feb 2020 23:30)  HR: 60 (09 Feb 2020 08:00) (60 - 85)  BP: 154/68 (09 Feb 2020 08:00) (134/80 - 181/84)  RR: 17 (09 Feb 2020 08:00) (17 - 17)  SpO2: 98% (09 Feb 2020 08:00) (91% - 98%)  I&O's Summary    PHYSICAL EXAM:  General: NAD, A/O x 3; obese  ENT: MMM, no thrush  Neck: Supple, No JVD  Lungs: Clear to auscultation bilaterally, good air entry, non-labored breathing  Cardio: +s1/s2  Abdomen: Soft, Nontender, Nondistended; Bowel sounds present  Extremities: No calf tenderness    LABS:                        10.6   8.08  )-----------( 343      ( 09 Feb 2020 07:14 )             33.5     02-08    140  |  104  |  8   ----------------------------<  108  4.3   |  31  |  0.55    Ca    10.3      08 Feb 2020 07:36    eGFR if Non African American: 98 mL/min/1.73M2 (02-08-20 @ 07:36)  eGFR if : 113 mL/min/1.73M2 (02-08-20 @ 07:36)    TSH 1.04   TSH with FT4 reflex --  Total T3 --    RADIOLOGY & ADDITIONAL TESTS:  no new tests Patient is a 64y old  Female who presents with a chief complaint of Patient is a 64y old  Female who presents with a chief complaint of Left Anterior THR for severe left hip OA (05 Feb 2020 16:12) (07 Feb 2020 09:37)      Patient seen and examined at bedside. patient with desaturation overnight will need cpap prior to discharge ; continue to monitor pulse ox     ALLERGIES:  adhesives (Rash)  Cardizem (Rash)  Fish Products (Rash)  Flonase (Swelling)  Levaquin (Rash)    MEDICATIONS  (STANDING):  acetaminophen   Tablet .. 1000 milliGRAM(s) Oral every 8 hours  amLODIPine   Tablet 5 milliGRAM(s) Oral daily  apixaban 2.5 milliGRAM(s) Oral every 12 hours  celecoxib 100 milliGRAM(s) Oral every 12 hours  latanoprost 0.005% Ophthalmic Solution 1 Drop(s) Both EYES at bedtime  levothyroxine 50 MICROGram(s) Oral <User Schedule>  levothyroxine 100 MICROGram(s) Oral <User Schedule>  loratadine 10 milliGRAM(s) Oral daily  losartan 100 milliGRAM(s) Oral daily  metoprolol succinate  milliGRAM(s) Oral every 12 hours  pantoprazole    Tablet 40 milliGRAM(s) Oral before breakfast  polyethylene glycol 3350 17 Gram(s) Oral daily  senna 2 Tablet(s) Oral at bedtime    MEDICATIONS  (PRN):  ALBUTerol    90 MICROgram(s) HFA Inhaler 2 Puff(s) Inhalation every 4 hours PRN Shortness of Breath and/or Wheezing  aluminum hydroxide/magnesium hydroxide/simethicone Suspension 30 milliLiter(s) Oral four times a day PRN Indigestion  bisacodyl Suppository 10 milliGRAM(s) Rectal daily PRN If no bowel movement by POD#2  HYDROmorphone  Injectable 0.5 milliGRAM(s) IV Push every 3 hours PRN Severe Pain (7 - 10)  magnesium hydroxide Suspension 30 milliLiter(s) Oral daily PRN Constipation  ondansetron Injectable 4 milliGRAM(s) IV Push every 6 hours PRN Nausea and/or Vomiting  oxyCODONE    IR 5 milliGRAM(s) Oral every 3 hours PRN Mild Pain (1 - 3)  oxyCODONE    IR 10 milliGRAM(s) Oral every 3 hours PRN Moderate Pain (4 - 6)    Vital Signs Last 24 Hrs  T(F): 98.6 (09 Feb 2020 08:00), Max: 98.9 (08 Feb 2020 23:30)  HR: 60 (09 Feb 2020 08:00) (60 - 85)  BP: 154/68 (09 Feb 2020 08:00) (134/80 - 181/84)  RR: 17 (09 Feb 2020 08:00) (17 - 17)  SpO2: 98% (09 Feb 2020 08:00) (91% - 98%)  I&O's Summary    PHYSICAL EXAM:  General: NAD, A/O x 3; obese  ENT: MMM, no thrush  Neck: Supple, No JVD  Lungs: Clear to auscultation bilaterally, good air entry, non-labored breathing  Cardio: +s1/s2  Abdomen: Soft, Nontender, Nondistended; Bowel sounds present  Extremities: No calf tenderness    LABS:                        10.6   8.08  )-----------( 343      ( 09 Feb 2020 07:14 )             33.5     02-08    140  |  104  |  8   ----------------------------<  108  4.3   |  31  |  0.55    Ca    10.3      08 Feb 2020 07:36    eGFR if Non African American: 98 mL/min/1.73M2 (02-08-20 @ 07:36)  eGFR if : 113 mL/min/1.73M2 (02-08-20 @ 07:36)    TSH 1.04   TSH with FT4 reflex --  Total T3 --    RADIOLOGY & ADDITIONAL TESTS:  no new tests

## 2020-02-09 NOTE — CHART NOTE - NSCHARTNOTEFT_GEN_A_CORE
Called to bedside to evaluate LLE.  Looks swollen and mildly erythematous.  Pt said she has a hx of cellulitis in that leg in the past.  Pt is s/p Left Hip surgery POD #4.  Pt denies any fevers or chills.  Per nursing, she has been afebrile.     Vital Signs Last 24 Hrs  T(C): 36.9 (09 Feb 2020 15:04), Max: 37.2 (08 Feb 2020 23:30)  T(F): 98.5 (09 Feb 2020 15:04), Max: 98.9 (08 Feb 2020 23:30)  HR: 71 (09 Feb 2020 15:04) (60 - 75)  BP: 162/82 (09 Feb 2020 15:04) (135/82 - 181/84)  BP(mean): --  RR: 17 (09 Feb 2020 15:04) (17 - 17)  SpO2: 96% (09 Feb 2020 15:04) (93% - 98%)    GENERAL: NAD with NC  CHEST/LUNG: Clear to percussion bilaterally; No rales, rhonchi, wheezing, or rubs  HEART: Regular rate and rhythm; No murmurs, rubs, or gallops  ABDOMEN: Soft, Nontender, Nondistended; Bowel sounds present  EXTREMITIES: LLE is swelling and mildly erythematous.       A/P  LLE Swelling  obtain venous dopplers b/l.   nocturnist will follow up results.   If negative, perhaps work up for cellulitis.

## 2020-02-09 NOTE — PROGRESS NOTE ADULT - SUBJECTIVE AND OBJECTIVE BOX
ORTHOPEDIC PA PROGRESS NOTE  HARIS NOEL      64y Female                                 SY 2WST 221 02                                                                                                                           POD #    4d    STATUS POST:       Procedure: Total hip arthroplasty: Left , Anterior             Patient seen and examined at bedside.      Current Pain Management:    acetaminophen   Tablet .. 1000 milliGRAM(s) Oral every 8 hours  celecoxib 100 milliGRAM(s) Oral every 12 hours  HYDROmorphone  Injectable 0.5 milliGRAM(s) IV Push every 3 hours PRN  ondansetron Injectable 4 milliGRAM(s) IV Push every 6 hours PRN  oxyCODONE    IR 5 milliGRAM(s) Oral every 3 hours PRN  oxyCODONE    IR 10 milliGRAM(s) Oral every 3 hours PRN      T(F): 98.6  HR: 60  BP: 154/68  RR: 17  SpO2: 98%                         10.6   8.08  )-----------( 343      ( 09 Feb 2020 07:14 )             33.5               Physical Exam :    -   Dressing C/D/I.violet   -   Distal Neurvascular status intact grossly.   -   Warm well perfused; capillary refill <3 seconds   -   (+)EHL/FHL   -   (+) Sensation to light touch  -   (-) Calf tenderness Bilaterally      A/P: 64y Female s/p Total hip arthroplasty: Left , Anterior     -   Ortho Stable  -   Pain control:  acetaminophen   Tablet .. 1000 milliGRAM(s) Oral every 8 hours  celecoxib 100 milliGRAM(s) Oral every 12 hours  HYDROmorphone  Injectable 0.5 milliGRAM(s) IV Push every 3 hours PRN  ondansetron Injectable 4 milliGRAM(s) IV Push every 6 hours PRN  oxyCODONE    IR 5 milliGRAM(s) Oral every 3 hours PRN  oxyCODONE    IR 10 milliGRAM(s) Oral every 3 hours PRN    -   Medicine to follow  -   DVT ppx:    PAS +  apixaban: 2.5 milliGRAM(s) Oral, apixaban: 2.5 milliGRAM(s) Oral  -   PT/OT OOB,  Weight bearing status: WBAT   -  Dispo:  Home Sunday/monday  -   Prescribed Medications:  3:1 Commode: To assist with ADLs s/p left anterior total hip replacement  apixaban 2.5 mg oral tablet: 1 tab orally every 12 hours  apixaban 2.5 mg oral tablet: 1 tab orally every 12 hours  celecoxib 100 mg oral capsule: 1 cap orally every 12 hours  oxyCODONE 5 mg oral tablet: 1 tab(s) orally every 4 hours, As Needed -Mild Pain (1 - 3) MDD:6  pantoprazole 40 mg oral delayed release tablet: 1 tab(s) orally once a day (before a meal)  Rolling Walker with 5 inch Wheels: To assist with ambulation s/p left anterior total hip replacement

## 2020-02-09 NOTE — PROGRESS NOTE ADULT - ASSESSMENT
#s/p LEFT THR  - pain management    - Bowel regimen  - PT  - VTE PPx - Eliquis    #Left Calf Pain/Tenderness  - LE dopplers negative for DVT 2/7       #HTN  - toprol, norvasc, losartan  - monitor blood pressure     #Glaucoma  - latanoprost    #Hyperparathyroidism, subcentimeter thyroid nodules  - followed by Dr. Perlman - Endocrinologist  - explains mild elevation in Calcium on pre-op labs  - being managed conservatively for now    Mild LFT Elev on preop labs  - stable post-op  - no need to trend further    #Likely has undiagnosed JOSE RAUL  - reports loud snoring and unrestful sleep  - never tested for JOSE RAUL  - had desaturations down to 78% during surgery and in PACU; also had desaturations overnight  - will need formal testing once d/c  - pulm consult appreciated  - will need cpap prior to d/c   - monitor pulse ox    #B/L LE Edema  - cardiac clearance reviewed in chart  - echo showed normal EF, abnormal relaxation  - vascular clearance reviewed - has venous HTN in b/l legs, had LE dopplers on 1/20/20 which were neg for DVT    #DVT prophylaxis  - eliquis #s/p LEFT THR  - pain management    - Bowel regimen  - PT  - VTE PPx - Eliquis    #HTN  - toprol, norvasc, losartan  - monitor blood pressure     #Glaucoma  - latanoprost    #Hyperparathyroidism, subcentimeter thyroid nodules  - followed by Dr. Perlman - Endocrinologist  - explains mild elevation in Calcium on pre-op labs  - being managed conservatively for now    Mild LFT Elev on preop labs  - stable post-op  - no need to trend further    #Likely has undiagnosed JOSE RAUL  - reports loud snoring and unrestful sleep  - never tested for JOSE RAUL  - had desaturations down to 78% during surgery and in PACU; also had desaturations overnight  - will need formal testing once d/c  - pulm consult appreciated  - will need cpap prior to d/c   - monitor pulse ox    #B/L LE Edema  - cardiac clearance reviewed in chart  - echo showed normal EF, abnormal relaxation  - vascular clearance reviewed - has venous HTN in b/l legs, had LE dopplers on 1/20/20 which were neg for DVT    #DVT prophylaxis  - eliquis

## 2020-02-10 VITALS
OXYGEN SATURATION: 95 % | SYSTOLIC BLOOD PRESSURE: 163 MMHG | TEMPERATURE: 98 F | HEART RATE: 72 BPM | RESPIRATION RATE: 17 BRPM | DIASTOLIC BLOOD PRESSURE: 76 MMHG

## 2020-02-10 PROCEDURE — 85018 HEMOGLOBIN: CPT

## 2020-02-10 PROCEDURE — 94660 CPAP INITIATION&MGMT: CPT

## 2020-02-10 PROCEDURE — 82803 BLOOD GASES ANY COMBINATION: CPT

## 2020-02-10 PROCEDURE — 76000 FLUOROSCOPY <1 HR PHYS/QHP: CPT

## 2020-02-10 PROCEDURE — 80076 HEPATIC FUNCTION PANEL: CPT

## 2020-02-10 PROCEDURE — 85027 COMPLETE CBC AUTOMATED: CPT

## 2020-02-10 PROCEDURE — C1776: CPT

## 2020-02-10 PROCEDURE — 88311 DECALCIFY TISSUE: CPT

## 2020-02-10 PROCEDURE — 93970 EXTREMITY STUDY: CPT

## 2020-02-10 PROCEDURE — 85014 HEMATOCRIT: CPT

## 2020-02-10 PROCEDURE — 97161 PT EVAL LOW COMPLEX 20 MIN: CPT

## 2020-02-10 PROCEDURE — 88305 TISSUE EXAM BY PATHOLOGIST: CPT

## 2020-02-10 PROCEDURE — 80048 BASIC METABOLIC PNL TOTAL CA: CPT

## 2020-02-10 PROCEDURE — 97530 THERAPEUTIC ACTIVITIES: CPT

## 2020-02-10 PROCEDURE — 36415 COLL VENOUS BLD VENIPUNCTURE: CPT

## 2020-02-10 PROCEDURE — 84443 ASSAY THYROID STIM HORMONE: CPT

## 2020-02-10 PROCEDURE — C1713: CPT

## 2020-02-10 PROCEDURE — 97116 GAIT TRAINING THERAPY: CPT

## 2020-02-10 PROCEDURE — 97535 SELF CARE MNGMENT TRAINING: CPT

## 2020-02-10 PROCEDURE — 97110 THERAPEUTIC EXERCISES: CPT

## 2020-02-10 PROCEDURE — 99238 HOSP IP/OBS DSCHRG MGMT 30/<: CPT

## 2020-02-10 PROCEDURE — 97165 OT EVAL LOW COMPLEX 30 MIN: CPT

## 2020-02-10 RX ADMIN — Medication 1000 MILLIGRAM(S): at 06:33

## 2020-02-10 RX ADMIN — LOSARTAN POTASSIUM 100 MILLIGRAM(S): 100 TABLET, FILM COATED ORAL at 06:33

## 2020-02-10 RX ADMIN — Medication 1000 MILLIGRAM(S): at 14:27

## 2020-02-10 RX ADMIN — Medication 100 MILLIGRAM(S): at 06:33

## 2020-02-10 RX ADMIN — Medication 1000 MILLIGRAM(S): at 14:26

## 2020-02-10 RX ADMIN — LORATADINE 10 MILLIGRAM(S): 10 TABLET ORAL at 14:26

## 2020-02-10 RX ADMIN — Medication 50 MICROGRAM(S): at 09:11

## 2020-02-10 RX ADMIN — APIXABAN 2.5 MILLIGRAM(S): 2.5 TABLET, FILM COATED ORAL at 09:11

## 2020-02-10 RX ADMIN — AMLODIPINE BESYLATE 5 MILLIGRAM(S): 2.5 TABLET ORAL at 06:33

## 2020-02-10 RX ADMIN — CELECOXIB 100 MILLIGRAM(S): 200 CAPSULE ORAL at 09:11

## 2020-02-10 RX ADMIN — PANTOPRAZOLE SODIUM 40 MILLIGRAM(S): 20 TABLET, DELAYED RELEASE ORAL at 06:33

## 2020-02-10 NOTE — PROGRESS NOTE ADULT - ASSESSMENT
64y old  Female who presents with a chief complaint of Left Anterior THR for severe left hip , now admitted to  for aftercare following surgery      Osteoarthritis of left HIP now #s/p LEFT THR  - pain management    - Bowel regimen  - PT  - VTE PPx - Eliquis    Intermittment periods of desaturation likely due to JOSE RAUL  never tested for JOSE RAUL  reports loud snoring and unrestful sleep  initially patient had desaturations down to 78% during surgery and in PACU; also had desaturations overnight  now on room air  patient does not like  CPAP  pulm consult appreciated  Medical Team has given patient referral  to outpatient pulmonlogist for sleep referral  can discharge patient without CPAP    left lower extremity swelling - concern for cellulitis  Doppler negative  patient started empirically on bactrim  leg is non tender, non erthyamateous.   don't think this is cellulitis. although with hx . safe to start empirically resume    #HTN  - toprol, norvasc, losartan    #Glaucoma  - latanoprost    #Hyperparathyroidism, subcentimeter thyroid nodules  - followed by Dr. Perlman - Endocrinologist  - explains mild elevation in Calcium on pre-op labs  - being managed conservatively for now    Mild LFT Elev on preop labs  - stable post-op  - no need to trend further      #DVT prophylaxis  - eliquis      At this time patient is medically stable for discharge. She is refusing cpap. On room air. ambulating well. she can be discharged with outpatient pulmonary followup

## 2020-02-10 NOTE — PROGRESS NOTE ADULT - SUBJECTIVE AND OBJECTIVE BOX
Date/Time Patient Seen:  		  Referring MD:   Data Reviewed	       Patient is a 64y old  Female who presents with a chief complaint of Total hip replacement, left anterior (10 Feb 2020 07:13)      Subjective/HPI     PAST MEDICAL & SURGICAL HISTORY:  Morbid obesity with BMI of 50.0-59.9, adult: BMI 51.9  Chronic venous insufficiency  Glaucoma  Hyperparathyroidism: being monitored by endocrinologist  Rosacea  Migraines  Allergic rhinitis  GERD (gastroesophageal reflux disease)  Raynauds disease  Hashimoto's thyroiditis  Vertigo  Hypothyroidism  HTN (hypertension)  Osteoarthritis, hip, bilateral  Osteoarthritis: bila  History of pterygium excision: left eye 2005  Tibialis posterior tendon rupture: repair and foot surgery  left 2012  S/P hysterectomy: 2002  Status post trigger finger release: right thumb and middle finger 2011  S/P carpal tunnel release: right 2009  Cyst of salivary gland: excision 1989  Adenoma of left breast: excision 1997  S/P excision of neuroma: left ankle 1988  S/P ankle ligament repair: left ankle 1985        Medication list         MEDICATIONS  (STANDING):  acetaminophen   Tablet .. 1000 milliGRAM(s) Oral every 8 hours  amLODIPine   Tablet 5 milliGRAM(s) Oral daily  apixaban 2.5 milliGRAM(s) Oral every 12 hours  celecoxib 100 milliGRAM(s) Oral every 12 hours  latanoprost 0.005% Ophthalmic Solution 1 Drop(s) Both EYES at bedtime  levothyroxine 50 MICROGram(s) Oral <User Schedule>  levothyroxine 100 MICROGram(s) Oral <User Schedule>  loratadine 10 milliGRAM(s) Oral daily  losartan 100 milliGRAM(s) Oral daily  metoprolol succinate  milliGRAM(s) Oral every 12 hours  pantoprazole    Tablet 40 milliGRAM(s) Oral before breakfast  polyethylene glycol 3350 17 Gram(s) Oral daily  senna 2 Tablet(s) Oral at bedtime    MEDICATIONS  (PRN):  ALBUTerol    90 MICROgram(s) HFA Inhaler 2 Puff(s) Inhalation every 4 hours PRN Shortness of Breath and/or Wheezing  aluminum hydroxide/magnesium hydroxide/simethicone Suspension 30 milliLiter(s) Oral four times a day PRN Indigestion  bisacodyl Suppository 10 milliGRAM(s) Rectal daily PRN If no bowel movement by POD#2  HYDROmorphone  Injectable 0.5 milliGRAM(s) IV Push every 3 hours PRN Severe Pain (7 - 10)  magnesium hydroxide Suspension 30 milliLiter(s) Oral daily PRN Constipation  ondansetron Injectable 4 milliGRAM(s) IV Push every 6 hours PRN Nausea and/or Vomiting  oxyCODONE    IR 5 milliGRAM(s) Oral every 3 hours PRN Mild Pain (1 - 3)  oxyCODONE    IR 10 milliGRAM(s) Oral every 3 hours PRN Moderate Pain (4 - 6)         Vitals log        ICU Vital Signs Last 24 Hrs  T(C): 36.8 (10 Feb 2020 07:32), Max: 36.9 (09 Feb 2020 15:04)  T(F): 98.3 (10 Feb 2020 07:32), Max: 98.5 (09 Feb 2020 15:04)  HR: 62 (10 Feb 2020 07:32) (60 - 71)  BP: 169/82 (10 Feb 2020 07:32) (146/78 - 169/82)  BP(mean): --  ABP: --  ABP(mean): --  RR: 16 (10 Feb 2020 07:32) (16 - 17)  SpO2: 93% (10 Feb 2020 07:32) (93% - 97%)           Input and Output:  I&O's Detail      Lab Data                        10.6   8.08  )-----------( 343      ( 09 Feb 2020 07:14 )             33.5                   Review of Systems	      Objective     Physical Examination    heart s1s2  lung dc BS  abd soft      Pertinent Lab findings & Imaging      Liriano:  NO   Adequate UO     I&O's Detail           Discussed with:     Cultures:	        Radiology

## 2020-02-10 NOTE — PROGRESS NOTE ADULT - SUBJECTIVE AND OBJECTIVE BOX
As per Dr. Harris, due to patients history of cellulitis of the left lower extremity we will prophylactically place her on Bactrim  BID x10 days.  If there are any worsening symptoms of cellulitis while patient is at home she has been instructed to call the office immediately.  I have discussed signs and symptoms of cellulitis with the patient

## 2020-02-10 NOTE — PROGRESS NOTE ADULT - SUBJECTIVE AND OBJECTIVE BOX
Patient seen and examined at bedside. reports she feels well. denies chest pain shortness of breath cough.   reports she does not like the cpap machine. feels like she is suffocating. She also reports her left leg still is swollen compared to the right  on bactrim for empiric therapy for LEFT Lower extremity cellulitis    Review of Systems:  General:denies fever chills, headache, weakness  HEENT: denies blurry vision,diffculty swallowing, difficulty hearing, tinnitus  Cardiovascular: denies chest pain  ,palpitations  Pulmonary:denies shortness of breath, cough, wheezing, hemoptysis  Gastrointestinal: denies abdominal pain, constipation, diarrhea,nausea , vomiting, hematochezia  : denies hematuria, dysuria, or incontinence  Neurological: denies weakness, numbness , tingling, dizziness, tremors  MSK: denies muscle pain, difficulty ambulating, ++++left leg swelling, back pain  skin: denies skin rash, itching, burning, or  skin lesions  Psychiatrical: denies mood disturbances, anxierty, feeling depressed, depression , or difficulty sleeping    Objective:  Vitals  T(C): 36.9 (02-10-20 @ 15:23), Max: 36.9 (02-10-20 @ 15:23)  HR: 72 (02-10-20 @ 15:23) (60 - 72)  BP: 163/76 (02-10-20 @ 15:23) (146/78 - 169/82)  RR: 17 (02-10-20 @ 15:23) (16 - 17)  SpO2: 95% (02-10-20 @ 15:23) (93% - 97%)    Physical Exam:  General: comfortable, no acute distress, well nourished  HEENT: Atraumatic, no LAD, trachea midline, PERRLA  Cardiovascular: normal s1s2, no murmurs, gallops or fricition rubs  Pulmonary: decreased, no wheezing , rhonchi  Gastrointestinal: soft non tender non distended, no masses felt, no organomegally  Muscloskeletal: no lower extremity edema, intact bilateral lower extremity pulses  Neurological: CN II-12 intact. No focal weakness  Psychiatrical: normal mood, cooperative  SKIN: no rash, lesions or ulcers

## 2020-02-10 NOTE — PROGRESS NOTE ADULT - SUBJECTIVE AND OBJECTIVE BOX
HARIS COHEN [id number]    Pt is a 64y year old Female s/p left THR. pain is 3/10. Tolerating regular diet, (+) voids.  Denies chest pain/shortness of breath/nausea/vomitting.     Vital Signs Last 24 Hrs  T(C): 36.7 (09 Feb 2020 23:44), Max: 37 (09 Feb 2020 08:00)  T(F): 98 (09 Feb 2020 23:44), Max: 98.6 (09 Feb 2020 08:00)  HR: 61 (10 Feb 2020 06:31) (60 - 71)  BP: 152/75 (10 Feb 2020 06:31) (146/78 - 162/82)  BP(mean): --  RR: 16 (10 Feb 2020 06:31) (16 - 17)  SpO2: 97% (10 Feb 2020 06:31) (96% - 98%)                              10.6   8.08  )-----------( 343      ( 09 Feb 2020 07:14 )             33.5     02-08    140  |  104  |  8   ----------------------------<  108<H>  4.3   |  31  |  0.55    Ca    10.3      08 Feb 2020 07:36          PE:   LLE: NUBIA intact. Sensation intact to light touch distally, (+2) DP/PT pulses, EHL/FHL/TA intact, Capillary refill < 2 seconds. negative calf tenderness. PAS on.  Moderate swelling to left lower extremity, mild amount of erythema on lower leg distal to patella.      A: 64y year old Female s/p left THR POD#5    Plan:   -DVT ppx = PAS +  apixaban 2.5 milliGRAM(s) Oral every 12 hours    -PT/OT=OOB  -Anterior Hip dislocation precautions  -Pain control   -Medicine to follow   -Incentive spirometry  -Doppler was done on Friday, results negative.  Doppler done yesterday evening is waiting for formal reading but preliminary reading is negative.  Patient is concerned about positive cellulitis as she has a history of cellulitis on this extremity.  She is currently afebrile with No WBC elevation.  Will discuss with Doctor Steven today.  dispo: Home pending PT/Medical clearance

## 2020-02-10 NOTE — PROGRESS NOTE ADULT - PROBLEM SELECTOR PLAN 1
LE dopplers - NEG  obesity  HTN  post op  OP  OA  JOSE RAUL - by features and clinical history -  tolerated CPAP last night - empiric settings - and nasal mask  [facility] rx regimen and BP control  DVT p  I sanchez  PT assessment  wound care  skin care  out of bed with assist  sleep hygiene and weight management discussed  will need formal Sleep Apnea testing with [facility] - either in [place] or [place]  discussed with pt and her sister.

## 2020-02-21 ENCOUNTER — APPOINTMENT (OUTPATIENT)
Dept: ORTHOPEDIC SURGERY | Facility: CLINIC | Age: 65
End: 2020-02-21
Payer: COMMERCIAL

## 2020-02-21 VITALS — TEMPERATURE: 98.9 F | HEART RATE: 62 BPM | DIASTOLIC BLOOD PRESSURE: 76 MMHG | SYSTOLIC BLOOD PRESSURE: 147 MMHG

## 2020-02-21 PROCEDURE — 99024 POSTOP FOLLOW-UP VISIT: CPT

## 2020-02-21 PROCEDURE — 73502 X-RAY EXAM HIP UNI 2-3 VIEWS: CPT | Mod: LT

## 2020-02-21 RX ORDER — ACETAMINOPHEN 500 MG/1
500 TABLET ORAL
Refills: 0 | Status: ACTIVE | COMMUNITY

## 2020-03-12 ENCOUNTER — OUTPATIENT (OUTPATIENT)
Dept: OUTPATIENT SERVICES | Facility: HOSPITAL | Age: 65
LOS: 1 days | End: 2020-03-12
Payer: COMMERCIAL

## 2020-03-12 VITALS
RESPIRATION RATE: 15 BRPM | DIASTOLIC BLOOD PRESSURE: 82 MMHG | TEMPERATURE: 98 F | OXYGEN SATURATION: 95 % | SYSTOLIC BLOOD PRESSURE: 160 MMHG | HEART RATE: 56 BPM | HEIGHT: 62 IN | WEIGHT: 285.94 LBS

## 2020-03-12 DIAGNOSIS — Z96.642 PRESENCE OF LEFT ARTIFICIAL HIP JOINT: Chronic | ICD-10-CM

## 2020-03-12 DIAGNOSIS — Z98.890 OTHER SPECIFIED POSTPROCEDURAL STATES: Chronic | ICD-10-CM

## 2020-03-12 DIAGNOSIS — M16.11 UNILATERAL PRIMARY OSTEOARTHRITIS, RIGHT HIP: ICD-10-CM

## 2020-03-12 DIAGNOSIS — S86.119A STRAIN OF OTHER MUSCLE(S) AND TENDON(S) OF POSTERIOR MUSCLE GROUP AT LOWER LEG LEVEL, UNSPECIFIED LEG, INITIAL ENCOUNTER: Chronic | ICD-10-CM

## 2020-03-12 DIAGNOSIS — Z01.818 ENCOUNTER FOR OTHER PREPROCEDURAL EXAMINATION: ICD-10-CM

## 2020-03-12 DIAGNOSIS — K11.6 MUCOCELE OF SALIVARY GLAND: Chronic | ICD-10-CM

## 2020-03-12 DIAGNOSIS — D24.2 BENIGN NEOPLASM OF LEFT BREAST: Chronic | ICD-10-CM

## 2020-03-12 DIAGNOSIS — Z90.710 ACQUIRED ABSENCE OF BOTH CERVIX AND UTERUS: Chronic | ICD-10-CM

## 2020-03-12 LAB
ALBUMIN SERPL ELPH-MCNC: 3.8 G/DL — SIGNIFICANT CHANGE UP (ref 3.3–5)
ALP SERPL-CCNC: 138 U/L — HIGH (ref 30–120)
ALT FLD-CCNC: 54 U/L DA — SIGNIFICANT CHANGE UP (ref 10–60)
ANION GAP SERPL CALC-SCNC: 6 MMOL/L — SIGNIFICANT CHANGE UP (ref 5–17)
APTT BLD: 38.6 SEC — HIGH (ref 28.5–37)
AST SERPL-CCNC: 29 U/L — SIGNIFICANT CHANGE UP (ref 10–40)
BILIRUB SERPL-MCNC: 0.6 MG/DL — SIGNIFICANT CHANGE UP (ref 0.2–1.2)
BLD GP AB SCN SERPL QL: SIGNIFICANT CHANGE UP
BUN SERPL-MCNC: 13 MG/DL — SIGNIFICANT CHANGE UP (ref 7–23)
CALCIUM SERPL-MCNC: 10.7 MG/DL — HIGH (ref 8.4–10.5)
CHLORIDE SERPL-SCNC: 103 MMOL/L — SIGNIFICANT CHANGE UP (ref 96–108)
CO2 SERPL-SCNC: 29 MMOL/L — SIGNIFICANT CHANGE UP (ref 22–31)
CREAT SERPL-MCNC: 0.81 MG/DL — SIGNIFICANT CHANGE UP (ref 0.5–1.3)
GLUCOSE SERPL-MCNC: 114 MG/DL — HIGH (ref 70–99)
HCT VFR BLD CALC: 41.8 % — SIGNIFICANT CHANGE UP (ref 34.5–45)
HGB BLD-MCNC: 13.5 G/DL — SIGNIFICANT CHANGE UP (ref 11.5–15.5)
INR BLD: 1.27 RATIO — HIGH (ref 0.88–1.16)
MCHC RBC-ENTMCNC: 30 PG — SIGNIFICANT CHANGE UP (ref 27–34)
MCHC RBC-ENTMCNC: 32.3 GM/DL — SIGNIFICANT CHANGE UP (ref 32–36)
MCV RBC AUTO: 92.9 FL — SIGNIFICANT CHANGE UP (ref 80–100)
MRSA PCR RESULT.: SIGNIFICANT CHANGE UP
NRBC # BLD: 0 /100 WBCS — SIGNIFICANT CHANGE UP (ref 0–0)
PLATELET # BLD AUTO: 310 K/UL — SIGNIFICANT CHANGE UP (ref 150–400)
POTASSIUM SERPL-MCNC: 4.4 MMOL/L — SIGNIFICANT CHANGE UP (ref 3.5–5.3)
POTASSIUM SERPL-SCNC: 4.4 MMOL/L — SIGNIFICANT CHANGE UP (ref 3.5–5.3)
PROT SERPL-MCNC: 7.7 G/DL — SIGNIFICANT CHANGE UP (ref 6–8.3)
PROTHROM AB SERPL-ACNC: 14.2 SEC — HIGH (ref 10–12.9)
RBC # BLD: 4.5 M/UL — SIGNIFICANT CHANGE UP (ref 3.8–5.2)
RBC # FLD: 13.3 % — SIGNIFICANT CHANGE UP (ref 10.3–14.5)
S AUREUS DNA NOSE QL NAA+PROBE: SIGNIFICANT CHANGE UP
SODIUM SERPL-SCNC: 138 MMOL/L — SIGNIFICANT CHANGE UP (ref 135–145)
WBC # BLD: 8.18 K/UL — SIGNIFICANT CHANGE UP (ref 3.8–10.5)
WBC # FLD AUTO: 8.18 K/UL — SIGNIFICANT CHANGE UP (ref 3.8–10.5)

## 2020-03-12 PROCEDURE — 85610 PROTHROMBIN TIME: CPT

## 2020-03-12 PROCEDURE — 86850 RBC ANTIBODY SCREEN: CPT

## 2020-03-12 PROCEDURE — 93005 ELECTROCARDIOGRAM TRACING: CPT

## 2020-03-12 PROCEDURE — G0463: CPT

## 2020-03-12 PROCEDURE — 85027 COMPLETE CBC AUTOMATED: CPT

## 2020-03-12 PROCEDURE — 85730 THROMBOPLASTIN TIME PARTIAL: CPT

## 2020-03-12 PROCEDURE — 86900 BLOOD TYPING SEROLOGIC ABO: CPT

## 2020-03-12 PROCEDURE — 86901 BLOOD TYPING SEROLOGIC RH(D): CPT

## 2020-03-12 PROCEDURE — 87640 STAPH A DNA AMP PROBE: CPT

## 2020-03-12 PROCEDURE — 93010 ELECTROCARDIOGRAM REPORT: CPT

## 2020-03-12 PROCEDURE — 87641 MR-STAPH DNA AMP PROBE: CPT

## 2020-03-12 PROCEDURE — 80053 COMPREHEN METABOLIC PANEL: CPT

## 2020-03-12 PROCEDURE — 36415 COLL VENOUS BLD VENIPUNCTURE: CPT

## 2020-03-12 RX ORDER — AMLODIPINE BESYLATE 2.5 MG/1
1 TABLET ORAL
Qty: 0 | Refills: 0 | DISCHARGE

## 2020-03-12 RX ORDER — METOPROLOL TARTRATE 50 MG
0 TABLET ORAL
Qty: 0 | Refills: 0 | DISCHARGE

## 2020-03-12 NOTE — H&P PST ADULT - MUSCULOSKELETAL
details… detailed exam decreased ROM due to pain/no joint warmth/no joint erythema/no calf tenderness

## 2020-03-12 NOTE — H&P PST ADULT - NSICDXPASTSURGICALHX_GEN_ALL_CORE_FT
PAST SURGICAL HISTORY:  Adenoma of left breast excision 1997    Cyst of salivary gland excision 1989    History of pterygium excision left eye 2005    History of total hip replacement, left 2020    S/P ankle ligament repair left ankle 1985    S/P carpal tunnel release right 2009    S/P excision of neuroma left ankle 1988    S/P hysterectomy 2002    Status post trigger finger release right thumb and middle finger 2011    Tibialis posterior tendon rupture repair and foot surgery  left 2012

## 2020-03-12 NOTE — H&P PST ADULT - HISTORY OF PRESENT ILLNESS
63 yo female is scheduled for right total hip replacement *anterior approach* on 4/2/2020 with Dr Harris at [facility].  Right hip pain since 2017 with radiation to lower back and groin and front right leg for which she has tried physical therapy without relief. History of left total hip replacement 2/2020 with good results.    Using walker for ambulation; rates pain 10/10.

## 2020-03-12 NOTE — H&P PST ADULT - ASSESSMENT
63 yo female is scheduled for RIGHT total hip replacement *anterior approach* on 4/2/2020 with Dr Harris at [facility].

## 2020-03-12 NOTE — H&P PST ADULT - NSICDXPASTMEDICALHX_GEN_ALL_CORE_FT
PAST MEDICAL HISTORY:  Allergic rhinitis     At risk for sleep apnea     Chronic venous insufficiency     GERD (gastroesophageal reflux disease)     Glaucoma     Hashimoto's thyroiditis     HTN (hypertension)     Hyperparathyroidism being monitored by endocrinologist    Migraines     Morbid obesity with BMI of 50.0-59.9, adult BMI 52.3    Osteoarthritis of right hip     Raynauds disease     Rosacea     Vertigo

## 2020-03-12 NOTE — H&P PST ADULT - NSANTHOSAYNRD_GEN_A_CORE
No. JOSE RAUL screening performed.  STOP BANG Legend: 0-2 = LOW Risk; 3-4 = INTERMEDIATE Risk; 5-8 = HIGH Risk/home tested 3/11/2020

## 2020-03-16 PROCEDURE — 77262 THER RADIOLOGY TX PLNG INTRM: CPT

## 2020-04-14 ENCOUNTER — APPOINTMENT (OUTPATIENT)
Dept: ORTHOPEDIC SURGERY | Facility: CLINIC | Age: 65
End: 2020-04-14

## 2020-04-25 ENCOUNTER — MESSAGE (OUTPATIENT)
Age: 65
End: 2020-04-25

## 2020-04-28 ENCOUNTER — APPOINTMENT (OUTPATIENT)
Dept: ORTHOPEDIC SURGERY | Facility: CLINIC | Age: 65
End: 2020-04-28
Payer: COMMERCIAL

## 2020-04-28 VITALS
DIASTOLIC BLOOD PRESSURE: 64 MMHG | HEART RATE: 75 BPM | BODY MASS INDEX: 53.73 KG/M2 | HEIGHT: 62 IN | SYSTOLIC BLOOD PRESSURE: 174 MMHG | WEIGHT: 292 LBS

## 2020-04-28 VITALS — TEMPERATURE: 97.9 F

## 2020-04-28 DIAGNOSIS — M25.551 PAIN IN RIGHT HIP: ICD-10-CM

## 2020-04-28 PROCEDURE — 73502 X-RAY EXAM HIP UNI 2-3 VIEWS: CPT | Mod: LT

## 2020-04-28 PROCEDURE — 99024 POSTOP FOLLOW-UP VISIT: CPT

## 2020-05-05 ENCOUNTER — FORM ENCOUNTER (OUTPATIENT)
Age: 65
End: 2020-05-05

## 2020-05-12 RX ORDER — MUPIROCIN 20 MG/G
1 OINTMENT TOPICAL
Qty: 1 | Refills: 0
Start: 2020-05-12 | End: 2020-05-16

## 2020-05-13 ENCOUNTER — APPOINTMENT (OUTPATIENT)
Dept: ORTHOPEDIC SURGERY | Facility: HOSPITAL | Age: 65
End: 2020-05-13

## 2020-05-15 VITALS
WEIGHT: 291.45 LBS | HEART RATE: 72 BPM | RESPIRATION RATE: 16 BRPM | OXYGEN SATURATION: 97 % | HEIGHT: 62 IN | DIASTOLIC BLOOD PRESSURE: 80 MMHG | TEMPERATURE: 98 F | SYSTOLIC BLOOD PRESSURE: 175 MMHG

## 2020-05-15 RX ORDER — AMLODIPINE BESYLATE 2.5 MG/1
1 TABLET ORAL
Qty: 0 | Refills: 0 | DISCHARGE

## 2020-05-15 RX ORDER — LEVOTHYROXINE SODIUM 125 MCG
1 TABLET ORAL
Qty: 0 | Refills: 0 | DISCHARGE

## 2020-05-15 NOTE — H&P PST ADULT - NSICDXPASTMEDICALHX_GEN_ALL_CORE_FT
PAST MEDICAL HISTORY:  Allergic rhinitis     Chronic venous insufficiency     GERD (gastroesophageal reflux disease)     Glaucoma     Hashimoto's thyroiditis     HTN (hypertension)     Hyperparathyroidism being monitored by endocrinologist    Migraines     Morbid obesity with BMI of 50.0-59.9, adult BMI 52.3    JOSE RAUL (obstructive sleep apnea) in process of getting cpap machine    Osteoarthritis of right hip     Raynauds disease     Rosacea     Vertigo PAST MEDICAL HISTORY:  Allergic rhinitis     Chronic venous insufficiency     GERD (gastroesophageal reflux disease)     Glaucoma     Hashimoto's thyroiditis     HTN (hypertension)     Hyperparathyroidism being monitored by endocrinologist    Migraines     Morbid obesity with BMI of 50.0-59.9, adult BMI 52.3    JOSE RAUL (obstructive sleep apnea) has not yet started CPAP    Osteoarthritis of right hip     Raynauds disease     Rosacea     Vertigo

## 2020-05-15 NOTE — H&P PST ADULT - NSICDXPROBLEM_GEN_ALL_CORE_FT
PROBLEM DIAGNOSES  Problem: Osteoarthritis  Assessment and Plan: Right total hip replacement   pt will obtain medical , cardaic, and pulmonary clearance   Endocrinology clearance in chart   Labs will be done by PCP  on 5/15/20 and will fax to hospital with clearance   COVID testing on 5/19/20   T&S on admit

## 2020-05-15 NOTE — H&P PST ADULT - ASSESSMENT
65 yo female is scheduled for RIGHT total hip replacement *anterior approach* on 5/20/20 with Dr Harris at [facility].  COVID testing on 5/19/20

## 2020-05-15 NOTE — H&P PST ADULT - RESPIRATORY AND THORAX COMMENTS
h/o bronchitis in the past  3 yrs. ago , , JOSE RAUL recently diagnosed ; in process of getting CPAP machine - continue aspirin, statin, beta blocker

## 2020-05-15 NOTE — H&P PST ADULT - HISTORY OF PRESENT ILLNESS
63 yo female with 3 year history of progressively worsening right hip pain , difficulty walking . Reports constant pain with radiation to lower back/ groin and right leg . Pain is worsened by walking , sitting , getting up from sitting position with pain scale 10/10.  Using walker for ambulation. scheduled for right total hip replacement *anterior approach* on 5/20//2020 with Dr Harris

## 2020-05-19 ENCOUNTER — OUTPATIENT (OUTPATIENT)
Dept: OUTPATIENT SERVICES | Facility: HOSPITAL | Age: 65
LOS: 1 days | End: 2020-05-19
Payer: COMMERCIAL

## 2020-05-19 DIAGNOSIS — Z98.890 OTHER SPECIFIED POSTPROCEDURAL STATES: Chronic | ICD-10-CM

## 2020-05-19 DIAGNOSIS — D24.2 BENIGN NEOPLASM OF LEFT BREAST: Chronic | ICD-10-CM

## 2020-05-19 DIAGNOSIS — K11.6 MUCOCELE OF SALIVARY GLAND: Chronic | ICD-10-CM

## 2020-05-19 DIAGNOSIS — Z01.818 ENCOUNTER FOR OTHER PREPROCEDURAL EXAMINATION: ICD-10-CM

## 2020-05-19 DIAGNOSIS — M16.11 UNILATERAL PRIMARY OSTEOARTHRITIS, RIGHT HIP: ICD-10-CM

## 2020-05-19 DIAGNOSIS — S86.119A STRAIN OF OTHER MUSCLE(S) AND TENDON(S) OF POSTERIOR MUSCLE GROUP AT LOWER LEG LEVEL, UNSPECIFIED LEG, INITIAL ENCOUNTER: Chronic | ICD-10-CM

## 2020-05-19 DIAGNOSIS — Z96.642 PRESENCE OF LEFT ARTIFICIAL HIP JOINT: Chronic | ICD-10-CM

## 2020-05-19 DIAGNOSIS — Z90.710 ACQUIRED ABSENCE OF BOTH CERVIX AND UTERUS: Chronic | ICD-10-CM

## 2020-05-19 DIAGNOSIS — Z11.59 ENCOUNTER FOR SCREENING FOR OTHER VIRAL DISEASES: ICD-10-CM

## 2020-05-19 DIAGNOSIS — M19.90 UNSPECIFIED OSTEOARTHRITIS, UNSPECIFIED SITE: ICD-10-CM

## 2020-05-19 LAB — SARS-COV-2 RNA SPEC QL NAA+PROBE: SIGNIFICANT CHANGE UP

## 2020-05-19 PROCEDURE — 77431 RADIATION THERAPY MANAGEMENT: CPT

## 2020-05-19 PROCEDURE — 77300 RADIATION THERAPY DOSE PLAN: CPT | Mod: 26

## 2020-05-19 PROCEDURE — 87635 SARS-COV-2 COVID-19 AMP PRB: CPT

## 2020-05-19 PROCEDURE — G0463: CPT

## 2020-05-19 PROCEDURE — 77280 THER RAD SIMULAJ FIELD SMPL: CPT | Mod: 26

## 2020-05-19 NOTE — PROGRESS NOTE ADULT - SUBJECTIVE AND OBJECTIVE BOX
Presurgical evaluation:  Allergies:  Cardizem: Rash  Levaquin: Rash  Fish Products: Rash, Only allergic to COD, LOBSTER, and, HALIBUT  adhesives: Rash  Flonase: face swelling    Home Medications:   · 	mupirocin 2% Apply topically to affected area 2 times a day to nostrils  · 	Amlodipine (Norvasc) 5 mg once a day  · 	meclizine 25 mg PRN  · 	latanoprost 0.005% 1 drop(s) to each affected eye once a day (in the evening)  · 	Synthroid 50mcg once a day for 6  days   · 	Synthroid 100 once a week 7th day   · 	Albuterol (ProAir) HFA 90 mcg/inh 2 puff(s) inhaled 4 times a day, As Needed  · 	Cetirizine (ZyrTEC) 10 mg once a day  · 	Avapro 300 mg once a day  · 	metoprolol tartrate 100 mg 2 times a day  · 	Olopatadine (Pataday) 0.2% 1 drop(s) to each affected eye once a day, As Needed    PAST MEDICAL HISTORY:  Allergic rhinitis   Chronic venous insufficiency   GERD (gastroesophageal reflux disease)   Glaucoma   Hashimoto's thyroiditis   Hematuria due to 4 weeks of NSAID  HTN (hypertension)   Hyperparathyroidism being monitored by endocrinologist  Migraines   Morbid obesity with BMI of 50.0-59.9, adult BMI 52.3  JOSE RAUL (obstructive sleep apnea) in process of getting cpap machine  Osteoarthritis of right hip   Raynauds disease   Rosacea   Vertigo     PAST SURGICAL HISTORY:  Adenoma of left breast excision 1997  Cyst of salivary gland excision 1989  History of pterygium excision left eye 2005  History of total hip replacement, left 2020  S/P ankle ligament repair left ankle 1985  S/P carpal tunnel release right 2009  S/P excision of neuroma left ankle 1988  S/P hysterectomy 2002  Status post trigger finger release right thumb and middle finger 2011  Tibialis posterior tendon rupture repair and foot surgery  left 2012     Most recent labs of interest (3/12/2020):  SCr 0.81 mg/dL  PT/INR 14.2/1.27 (­)  aPTT 38.6 (­)  LFTs WNL  QTc 401ms (WNL)

## 2020-05-19 NOTE — PROGRESS NOTE ADULT - ASSESSMENT
Presurgical evaluation:  1.	Allergic to adhesives  2.	IV TXA  3.	Acetaminophen and Celecoxib 100mg q12h for multimodal pain management (patient has history of hematuria but tolerated Celecoxib 100mg q12h x 4 weeks after last joint replacement)  4.	Patient had preop RT for HO prevention  5.	Oxycodone PRN breakthrough pain (same as last surgery)  6.	VTE prophylaxis per Caprini score  7.	Nutrition copied for food allergies

## 2020-05-20 ENCOUNTER — INPATIENT (INPATIENT)
Facility: HOSPITAL | Age: 65
LOS: 1 days | Discharge: HOME | DRG: 470 | End: 2020-05-22
Attending: ORTHOPAEDIC SURGERY | Admitting: ORTHOPAEDIC SURGERY
Payer: COMMERCIAL

## 2020-05-20 ENCOUNTER — RESULT REVIEW (OUTPATIENT)
Age: 65
End: 2020-05-20

## 2020-05-20 ENCOUNTER — APPOINTMENT (OUTPATIENT)
Dept: ORTHOPEDIC SURGERY | Facility: HOSPITAL | Age: 65
End: 2020-05-20

## 2020-05-20 ENCOUNTER — TRANSCRIPTION ENCOUNTER (OUTPATIENT)
Age: 65
End: 2020-05-20

## 2020-05-20 VITALS
HEIGHT: 62 IN | WEIGHT: 291.45 LBS | SYSTOLIC BLOOD PRESSURE: 175 MMHG | TEMPERATURE: 99 F | OXYGEN SATURATION: 97 % | DIASTOLIC BLOOD PRESSURE: 81 MMHG | HEART RATE: 72 BPM | RESPIRATION RATE: 18 BRPM

## 2020-05-20 DIAGNOSIS — Z96.642 PRESENCE OF LEFT ARTIFICIAL HIP JOINT: Chronic | ICD-10-CM

## 2020-05-20 DIAGNOSIS — Z98.890 OTHER SPECIFIED POSTPROCEDURAL STATES: Chronic | ICD-10-CM

## 2020-05-20 DIAGNOSIS — K11.6 MUCOCELE OF SALIVARY GLAND: Chronic | ICD-10-CM

## 2020-05-20 DIAGNOSIS — M16.11 UNILATERAL PRIMARY OSTEOARTHRITIS, RIGHT HIP: ICD-10-CM

## 2020-05-20 DIAGNOSIS — Z90.710 ACQUIRED ABSENCE OF BOTH CERVIX AND UTERUS: Chronic | ICD-10-CM

## 2020-05-20 DIAGNOSIS — D24.2 BENIGN NEOPLASM OF LEFT BREAST: Chronic | ICD-10-CM

## 2020-05-20 DIAGNOSIS — S86.119A STRAIN OF OTHER MUSCLE(S) AND TENDON(S) OF POSTERIOR MUSCLE GROUP AT LOWER LEG LEVEL, UNSPECIFIED LEG, INITIAL ENCOUNTER: Chronic | ICD-10-CM

## 2020-05-20 LAB
ALBUMIN SERPL ELPH-MCNC: 3.7 G/DL — SIGNIFICANT CHANGE UP (ref 3.3–5)
ALP SERPL-CCNC: 125 U/L — HIGH (ref 30–120)
ALT FLD-CCNC: 46 U/L DA — SIGNIFICANT CHANGE UP (ref 10–60)
ANION GAP SERPL CALC-SCNC: 5 MMOL/L — SIGNIFICANT CHANGE UP (ref 5–17)
ANION GAP SERPL CALC-SCNC: 8 MMOL/L — SIGNIFICANT CHANGE UP (ref 5–17)
APTT BLD: 32.2 SEC — SIGNIFICANT CHANGE UP (ref 28.5–37)
AST SERPL-CCNC: 30 U/L — SIGNIFICANT CHANGE UP (ref 10–40)
BILIRUB SERPL-MCNC: 0.4 MG/DL — SIGNIFICANT CHANGE UP (ref 0.2–1.2)
BLD GP AB SCN SERPL QL: SIGNIFICANT CHANGE UP
BUN SERPL-MCNC: 13 MG/DL — SIGNIFICANT CHANGE UP (ref 7–23)
BUN SERPL-MCNC: 16 MG/DL — SIGNIFICANT CHANGE UP (ref 7–23)
CALCIUM SERPL-MCNC: 10.2 MG/DL — SIGNIFICANT CHANGE UP (ref 8.4–10.5)
CALCIUM SERPL-MCNC: 11.4 MG/DL — HIGH (ref 8.4–10.5)
CHLORIDE SERPL-SCNC: 101 MMOL/L — SIGNIFICANT CHANGE UP (ref 96–108)
CHLORIDE SERPL-SCNC: 102 MMOL/L — SIGNIFICANT CHANGE UP (ref 96–108)
CO2 SERPL-SCNC: 28 MMOL/L — SIGNIFICANT CHANGE UP (ref 22–31)
CO2 SERPL-SCNC: 29 MMOL/L — SIGNIFICANT CHANGE UP (ref 22–31)
CREAT SERPL-MCNC: 0.68 MG/DL — SIGNIFICANT CHANGE UP (ref 0.5–1.3)
CREAT SERPL-MCNC: 0.74 MG/DL — SIGNIFICANT CHANGE UP (ref 0.5–1.3)
GLUCOSE SERPL-MCNC: 115 MG/DL — HIGH (ref 70–99)
GLUCOSE SERPL-MCNC: 128 MG/DL — HIGH (ref 70–99)
HCT VFR BLD CALC: 37.9 % — SIGNIFICANT CHANGE UP (ref 34.5–45)
HCT VFR BLD CALC: 41.9 % — SIGNIFICANT CHANGE UP (ref 34.5–45)
HGB BLD-MCNC: 12.2 G/DL — SIGNIFICANT CHANGE UP (ref 11.5–15.5)
HGB BLD-MCNC: 13.6 G/DL — SIGNIFICANT CHANGE UP (ref 11.5–15.5)
INR BLD: 1.11 RATIO — SIGNIFICANT CHANGE UP (ref 0.88–1.16)
MCHC RBC-ENTMCNC: 29.4 PG — SIGNIFICANT CHANGE UP (ref 27–34)
MCHC RBC-ENTMCNC: 32.5 GM/DL — SIGNIFICANT CHANGE UP (ref 32–36)
MCV RBC AUTO: 90.7 FL — SIGNIFICANT CHANGE UP (ref 80–100)
NRBC # BLD: 0 /100 WBCS — SIGNIFICANT CHANGE UP (ref 0–0)
PLATELET # BLD AUTO: 334 K/UL — SIGNIFICANT CHANGE UP (ref 150–400)
POTASSIUM SERPL-MCNC: 4.2 MMOL/L — SIGNIFICANT CHANGE UP (ref 3.5–5.3)
POTASSIUM SERPL-MCNC: 4.3 MMOL/L — SIGNIFICANT CHANGE UP (ref 3.5–5.3)
POTASSIUM SERPL-SCNC: 4.2 MMOL/L — SIGNIFICANT CHANGE UP (ref 3.5–5.3)
POTASSIUM SERPL-SCNC: 4.3 MMOL/L — SIGNIFICANT CHANGE UP (ref 3.5–5.3)
PROT SERPL-MCNC: 8.2 G/DL — SIGNIFICANT CHANGE UP (ref 6–8.3)
PROTHROM AB SERPL-ACNC: 12.4 SEC — SIGNIFICANT CHANGE UP (ref 10–12.9)
RBC # BLD: 4.62 M/UL — SIGNIFICANT CHANGE UP (ref 3.8–5.2)
RBC # FLD: 12.8 % — SIGNIFICANT CHANGE UP (ref 10.3–14.5)
SODIUM SERPL-SCNC: 135 MMOL/L — SIGNIFICANT CHANGE UP (ref 135–145)
SODIUM SERPL-SCNC: 138 MMOL/L — SIGNIFICANT CHANGE UP (ref 135–145)
WBC # BLD: 8.25 K/UL — SIGNIFICANT CHANGE UP (ref 3.8–10.5)
WBC # FLD AUTO: 8.25 K/UL — SIGNIFICANT CHANGE UP (ref 3.8–10.5)

## 2020-05-20 PROCEDURE — 27130 TOTAL HIP ARTHROPLASTY: CPT | Mod: RT

## 2020-05-20 PROCEDURE — 88305 TISSUE EXAM BY PATHOLOGIST: CPT | Mod: 26

## 2020-05-20 PROCEDURE — 99223 1ST HOSP IP/OBS HIGH 75: CPT

## 2020-05-20 PROCEDURE — 88311 DECALCIFY TISSUE: CPT | Mod: 26

## 2020-05-20 RX ORDER — HYDROMORPHONE HYDROCHLORIDE 2 MG/ML
0.5 INJECTION INTRAMUSCULAR; INTRAVENOUS; SUBCUTANEOUS
Refills: 0 | Status: DISCONTINUED | OUTPATIENT
Start: 2020-05-20 | End: 2020-05-22

## 2020-05-20 RX ORDER — CEFAZOLIN SODIUM 1 G
3000 VIAL (EA) INJECTION EVERY 8 HOURS
Refills: 0 | Status: COMPLETED | OUTPATIENT
Start: 2020-05-20 | End: 2020-05-20

## 2020-05-20 RX ORDER — SENNA PLUS 8.6 MG/1
2 TABLET ORAL AT BEDTIME
Refills: 0 | Status: DISCONTINUED | OUTPATIENT
Start: 2020-05-20 | End: 2020-05-22

## 2020-05-20 RX ORDER — MECLIZINE HCL 12.5 MG
25 TABLET ORAL EVERY 6 HOURS
Refills: 0 | Status: DISCONTINUED | OUTPATIENT
Start: 2020-05-20 | End: 2020-05-22

## 2020-05-20 RX ORDER — ONDANSETRON 8 MG/1
4 TABLET, FILM COATED ORAL ONCE
Refills: 0 | Status: DISCONTINUED | OUTPATIENT
Start: 2020-05-20 | End: 2020-05-20

## 2020-05-20 RX ORDER — ACETAMINOPHEN 500 MG
1000 TABLET ORAL ONCE
Refills: 0 | Status: COMPLETED | OUTPATIENT
Start: 2020-05-20 | End: 2020-05-20

## 2020-05-20 RX ORDER — OXYCODONE HYDROCHLORIDE 5 MG/1
10 TABLET ORAL
Refills: 0 | Status: DISCONTINUED | OUTPATIENT
Start: 2020-05-20 | End: 2020-05-22

## 2020-05-20 RX ORDER — POLYETHYLENE GLYCOL 3350 17 G/17G
17 POWDER, FOR SOLUTION ORAL
Qty: 0 | Refills: 0 | DISCHARGE
Start: 2020-05-20

## 2020-05-20 RX ORDER — APIXABAN 2.5 MG/1
2.5 TABLET, FILM COATED ORAL EVERY 12 HOURS
Refills: 0 | Status: DISCONTINUED | OUTPATIENT
Start: 2020-05-21 | End: 2020-05-22

## 2020-05-20 RX ORDER — CELECOXIB 200 MG/1
1 CAPSULE ORAL
Qty: 60 | Refills: 0
Start: 2020-05-20 | End: 2020-06-18

## 2020-05-20 RX ORDER — CHLORHEXIDINE GLUCONATE 213 G/1000ML
1 SOLUTION TOPICAL ONCE
Refills: 0 | Status: COMPLETED | OUTPATIENT
Start: 2020-05-20 | End: 2020-05-20

## 2020-05-20 RX ORDER — APIXABAN 2.5 MG/1
1 TABLET, FILM COATED ORAL
Qty: 60 | Refills: 0
Start: 2020-05-20 | End: 2020-06-18

## 2020-05-20 RX ORDER — POLYETHYLENE GLYCOL 3350 17 G/17G
17 POWDER, FOR SOLUTION ORAL DAILY
Refills: 0 | Status: DISCONTINUED | OUTPATIENT
Start: 2020-05-20 | End: 2020-05-22

## 2020-05-20 RX ORDER — ALBUTEROL 90 UG/1
2 AEROSOL, METERED ORAL EVERY 4 HOURS
Refills: 0 | Status: DISCONTINUED | OUTPATIENT
Start: 2020-05-20 | End: 2020-05-22

## 2020-05-20 RX ORDER — CELECOXIB 200 MG/1
100 CAPSULE ORAL EVERY 12 HOURS
Refills: 0 | Status: DISCONTINUED | OUTPATIENT
Start: 2020-05-20 | End: 2020-05-22

## 2020-05-20 RX ORDER — SODIUM CHLORIDE 9 MG/ML
1000 INJECTION, SOLUTION INTRAVENOUS
Refills: 0 | Status: DISCONTINUED | OUTPATIENT
Start: 2020-05-20 | End: 2020-05-21

## 2020-05-20 RX ORDER — APREPITANT 80 MG/1
40 CAPSULE ORAL ONCE
Refills: 0 | Status: COMPLETED | OUTPATIENT
Start: 2020-05-20 | End: 2020-05-20

## 2020-05-20 RX ORDER — LEVOTHYROXINE SODIUM 125 MCG
50 TABLET ORAL
Refills: 0 | Status: DISCONTINUED | OUTPATIENT
Start: 2020-05-20 | End: 2020-05-21

## 2020-05-20 RX ORDER — LATANOPROST 0.05 MG/ML
1 SOLUTION/ DROPS OPHTHALMIC; TOPICAL AT BEDTIME
Refills: 0 | Status: DISCONTINUED | OUTPATIENT
Start: 2020-05-20 | End: 2020-05-22

## 2020-05-20 RX ORDER — METOPROLOL TARTRATE 50 MG
100 TABLET ORAL
Refills: 0 | Status: DISCONTINUED | OUTPATIENT
Start: 2020-05-20 | End: 2020-05-21

## 2020-05-20 RX ORDER — ACETAMINOPHEN 500 MG
2 TABLET ORAL
Qty: 0 | Refills: 0 | DISCHARGE
Start: 2020-05-20 | End: 2020-06-03

## 2020-05-20 RX ORDER — SENNA PLUS 8.6 MG/1
2 TABLET ORAL
Qty: 0 | Refills: 0 | DISCHARGE
Start: 2020-05-20

## 2020-05-20 RX ORDER — OXYCODONE HYDROCHLORIDE 5 MG/1
5 TABLET ORAL
Refills: 0 | Status: DISCONTINUED | OUTPATIENT
Start: 2020-05-20 | End: 2020-05-22

## 2020-05-20 RX ORDER — CEFAZOLIN SODIUM 1 G
3000 VIAL (EA) INJECTION ONCE
Refills: 0 | Status: COMPLETED | OUTPATIENT
Start: 2020-05-20 | End: 2020-05-20

## 2020-05-20 RX ORDER — OMEPRAZOLE 10 MG/1
1 CAPSULE, DELAYED RELEASE ORAL
Qty: 30 | Refills: 1
Start: 2020-05-20 | End: 2020-07-18

## 2020-05-20 RX ORDER — HYDROMORPHONE HYDROCHLORIDE 2 MG/ML
0.5 INJECTION INTRAMUSCULAR; INTRAVENOUS; SUBCUTANEOUS
Refills: 0 | Status: DISCONTINUED | OUTPATIENT
Start: 2020-05-20 | End: 2020-05-20

## 2020-05-20 RX ORDER — KETOTIFEN FUMARATE 0.34 MG/ML
1 SOLUTION OPHTHALMIC DAILY
Refills: 0 | Status: DISCONTINUED | OUTPATIENT
Start: 2020-05-20 | End: 2020-05-22

## 2020-05-20 RX ORDER — LEVOTHYROXINE SODIUM 125 MCG
100 TABLET ORAL
Refills: 0 | Status: DISCONTINUED | OUTPATIENT
Start: 2020-05-20 | End: 2020-05-21

## 2020-05-20 RX ORDER — LORATADINE 10 MG/1
10 TABLET ORAL DAILY
Refills: 0 | Status: DISCONTINUED | OUTPATIENT
Start: 2020-05-20 | End: 2020-05-22

## 2020-05-20 RX ORDER — LOSARTAN POTASSIUM 100 MG/1
100 TABLET, FILM COATED ORAL DAILY
Refills: 0 | Status: DISCONTINUED | OUTPATIENT
Start: 2020-05-22 | End: 2020-05-22

## 2020-05-20 RX ORDER — MAGNESIUM HYDROXIDE 400 MG/1
30 TABLET, CHEWABLE ORAL DAILY
Refills: 0 | Status: DISCONTINUED | OUTPATIENT
Start: 2020-05-20 | End: 2020-05-22

## 2020-05-20 RX ORDER — ONDANSETRON 8 MG/1
4 TABLET, FILM COATED ORAL EVERY 6 HOURS
Refills: 0 | Status: DISCONTINUED | OUTPATIENT
Start: 2020-05-20 | End: 2020-05-22

## 2020-05-20 RX ORDER — TRANEXAMIC ACID 100 MG/ML
1000 INJECTION, SOLUTION INTRAVENOUS ONCE
Refills: 0 | Status: COMPLETED | OUTPATIENT
Start: 2020-05-20 | End: 2020-05-20

## 2020-05-20 RX ORDER — SODIUM CHLORIDE 9 MG/ML
1000 INJECTION, SOLUTION INTRAVENOUS
Refills: 0 | Status: DISCONTINUED | OUTPATIENT
Start: 2020-05-20 | End: 2020-05-20

## 2020-05-20 RX ORDER — AMLODIPINE BESYLATE 2.5 MG/1
5 TABLET ORAL DAILY
Refills: 0 | Status: DISCONTINUED | OUTPATIENT
Start: 2020-05-22 | End: 2020-05-22

## 2020-05-20 RX ORDER — ACETAMINOPHEN 500 MG
1000 TABLET ORAL EVERY 6 HOURS
Refills: 0 | Status: COMPLETED | OUTPATIENT
Start: 2020-05-20 | End: 2020-05-20

## 2020-05-20 RX ORDER — ACETAMINOPHEN 500 MG
1000 TABLET ORAL EVERY 8 HOURS
Refills: 0 | Status: DISCONTINUED | OUTPATIENT
Start: 2020-05-21 | End: 2020-05-22

## 2020-05-20 RX ORDER — PANTOPRAZOLE SODIUM 20 MG/1
40 TABLET, DELAYED RELEASE ORAL
Refills: 0 | Status: DISCONTINUED | OUTPATIENT
Start: 2020-05-20 | End: 2020-05-22

## 2020-05-20 RX ORDER — APIXABAN 2.5 MG/1
1 TABLET, FILM COATED ORAL
Qty: 9 | Refills: 0
Start: 2020-05-20 | End: 2020-05-24

## 2020-05-20 RX ADMIN — CELECOXIB 100 MILLIGRAM(S): 200 CAPSULE ORAL at 21:14

## 2020-05-20 RX ADMIN — Medication 400 MILLIGRAM(S): at 21:13

## 2020-05-20 RX ADMIN — ONDANSETRON 4 MILLIGRAM(S): 8 TABLET, FILM COATED ORAL at 15:05

## 2020-05-20 RX ADMIN — CHLORHEXIDINE GLUCONATE 1 APPLICATION(S): 213 SOLUTION TOPICAL at 07:00

## 2020-05-20 RX ADMIN — LATANOPROST 1 DROP(S): 0.05 SOLUTION/ DROPS OPHTHALMIC; TOPICAL at 21:13

## 2020-05-20 RX ADMIN — Medication 400 MILLIGRAM(S): at 15:05

## 2020-05-20 RX ADMIN — HYDROMORPHONE HYDROCHLORIDE 0.5 MILLIGRAM(S): 2 INJECTION INTRAMUSCULAR; INTRAVENOUS; SUBCUTANEOUS at 10:58

## 2020-05-20 RX ADMIN — HYDROMORPHONE HYDROCHLORIDE 0.5 MILLIGRAM(S): 2 INJECTION INTRAMUSCULAR; INTRAVENOUS; SUBCUTANEOUS at 11:38

## 2020-05-20 RX ADMIN — Medication 200 MILLIGRAM(S): at 23:49

## 2020-05-20 RX ADMIN — APREPITANT 40 MILLIGRAM(S): 80 CAPSULE ORAL at 08:30

## 2020-05-20 RX ADMIN — HYDROMORPHONE HYDROCHLORIDE 0.5 MILLIGRAM(S): 2 INJECTION INTRAMUSCULAR; INTRAVENOUS; SUBCUTANEOUS at 10:48

## 2020-05-20 RX ADMIN — Medication 200 MILLIGRAM(S): at 16:23

## 2020-05-20 RX ADMIN — SODIUM CHLORIDE 100 MILLILITER(S): 9 INJECTION, SOLUTION INTRAVENOUS at 18:48

## 2020-05-20 RX ADMIN — OXYCODONE HYDROCHLORIDE 10 MILLIGRAM(S): 5 TABLET ORAL at 19:00

## 2020-05-20 RX ADMIN — OXYCODONE HYDROCHLORIDE 10 MILLIGRAM(S): 5 TABLET ORAL at 21:52

## 2020-05-20 NOTE — OCCUPATIONAL THERAPY INITIAL EVALUATION ADULT - GENERAL OBSERVATIONS, REHAB EVAL
Pt found supine in bed +SCDs, IV, hemovac, LE wedges for positioning, NCO2 Pt found supine in bed +SCDs, IV, NUBIA drain , LE wedges for positioning, NCO2 remote tele

## 2020-05-20 NOTE — DISCHARGE NOTE PROVIDER - NSDCCPTREATMENT_GEN_ALL_CORE_FT
PRINCIPAL PROCEDURE  Procedure: Right total hip replacement  Findings and Treatment: Anterior approach PRINCIPAL PROCEDURE  Procedure: Arthroplasty of right hip by anterior approach  Findings and Treatment: osteoarthritis right hip

## 2020-05-20 NOTE — DISCHARGE NOTE PROVIDER - HOSPITAL COURSE
The patient is a 64 y.o. female who presents with  known severe osteoarthritis of the right with severe adverse impact into quality of mobility & daily activities, and having failed conservative treatment,  for planned elective right anterior total hip replacement by  scheduled for May 20, 2020.  A pre-operative Orthopedic & Radiographic work-up was performed by the Surgeon. The patient received an admitting History & Physical exam performed in Pre Surgical Testing detailing her current clinical problem, past medical history including all medications, and clinical exam findings. Pre surgical testing, pre-anesthesia assessment, and a pre-operative medical evaluation were performed.  She is diagnosed with JOSE RAUL and will be evaluated for CPAP during this admission.    After admission on May 20th and receiving pre-operative parenteral prophylactic antibiotics, the patient  underwent an uneventful and uncomplicated right anterior total hip replacement by Dr. Harris. After completion of surgery and emergence from anesthesia, the patient was transferred to the PACU for routine hemodynamic, airway, and pain monitoring. After a stable PACU course, the patient was transferred to the surgical unit for acute post-operative care.    The patient had a stable and uncomplicated hospital course with no medical complications.    A course of post-op parenteral antibiotics was received to complete surgical prophylaxis. DVT prophylaxis was accomplished using intermittent pneumatic stockings and Eliquis.  Pre-operative medications were continued in the post-op course and adjusted as medically needed. A medical consultation from the Hospitalist service was obtained for post-operative medical co-management. Pulmonology was consulted for JOSE RAUL. Typical Physical & occupational therapy modalities post anterior total hip replacement were performed. Lab work were followed by the medical & Orthopedic team.     The patient had a clean appearing surgical incision with no sign of surgical site infections and had a stable neuro / vascular exam of the operated extremity.  After progression of mobility guided by the PT/ OT staff,  the patient was felt to benefit from further rehabilitative care for restoration to level of function. This was felt to best be accomplished in a ________________.  Discharge and Orthopedic Care instructions were delineated in the Discharge Plan and reviewed with the patient. All medications were delineated in the medication reconciliation tool and key points were reviewed with the patient. They were deemed stable from an Orthopedic & medical standpoint for discharge today.    Upon ( discharge from the rehab facility ) or (completion of Home care ) they will be  following up with Dr. Harris for office  follow up Orthopedic care. The patient is a 64 y.o. female who presents with  known severe osteoarthritis of the right with severe adverse impact into quality of mobility & daily activities, and having failed conservative treatment,  for planned elective right anterior total hip replacement by  scheduled for May 20, 2020.  A pre-operative Orthopedic & Radiographic work-up was performed by the Surgeon. The patient received an admitting History & Physical exam performed in Pre Surgical Testing detailing her current clinical problem, past medical history including all medications, and clinical exam findings. Pre surgical testing, pre-anesthesia assessment, and a pre-operative medical evaluation were performed.  She is diagnosed with JOSE RAUL and will be evaluated for CPAP during this admission.    After admission on May 20th and receiving pre-operative parenteral prophylactic antibiotics, the patient  underwent an uneventful and uncomplicated right anterior total hip replacement by Dr. Harris. After completion of surgery and emergence from anesthesia, the patient was transferred to the PACU for routine hemodynamic, airway, and pain monitoring. After a stable PACU course, the patient was transferred to the surgical unit for acute post-operative care.    The patient had a stable and uncomplicated hospital course with no medical complications.    A course of post-op parenteral antibiotics was received to complete surgical prophylaxis. DVT prophylaxis was accomplished using intermittent pneumatic stockings and Eliquis.  Pre-operative medications were continued in the post-op course and adjusted as medically needed. A medical consultation from the Hospitalist service was obtained for post-operative medical co-management. Pulmonology was consulted for JOSE RAUL. Typical Physical & occupational therapy modalities post anterior total hip replacement were performed. Lab work were followed by the medical & Orthopedic team.     The patient had a clean appearing surgical incision with no sign of surgical site infections and had a stable neuro / vascular exam of the operated extremity.  After progression of mobility guided by the PT/ OT staff,  the patient was felt to benefit from further rehabilitative care for restoration to level of function. This was felt to best be accomplished at home.  Discharge and Orthopedic Care instructions were delineated in the Discharge Plan and reviewed with the patient. All medications were delineated in the medication reconciliation tool and key points were reviewed with the patient. They were deemed stable from an Orthopedic & medical standpoint for discharge today.    Upon completion of Home care  they will be  following up with Dr. Harris for office  follow up Orthopedic care.

## 2020-05-20 NOTE — PHYSICAL THERAPY INITIAL EVALUATION ADULT - GENERAL OBSERVATIONS, REHAB EVAL
Pt received supine in bed. All lines intact. Pt agreeable to physical therapy. + IV + telemetry + supplemental 02 + violet drain

## 2020-05-20 NOTE — PHYSICAL THERAPY INITIAL EVALUATION ADULT - ADDITIONAL COMMENTS
Lives in house with adult son, 3 steps to enter no handrail. Will stay on 1st floor. Bed/bath with tub on 1st floor. Owns a RW and cane. Adult children to assist pt upon d/c.

## 2020-05-20 NOTE — BRIEF OPERATIVE NOTE - NSICDXBRIEFPOSTOP_GEN_ALL_CORE_FT
POST-OP DIAGNOSIS:  Primary localized osteoarthritis of right hip 20-May-2020 09:46:35  Gurjit Mccormick

## 2020-05-20 NOTE — CONSULT NOTE ADULT - SUBJECTIVE AND OBJECTIVE BOX
HPI:  64F with HTN, hypothyroidism, hyperparathyroidism, chronic venous insufficiency, Glaucoma and JOSE RAUL  has been combatting pain right hip for several years which has progressively worsened. She uses a walker to ambulate. Patient has tried multiple options for pain relief including OTC medication and as well as PT with minimal relief and has undergone elective replacement of right hip successfully.  She is currently resting in bed comfortable with good pain control.     REVIEW OF SYSTEMS:  CONSTITUTIONAL: No fever, weight loss, or fatigue  EYES: No eye pain, visual disturbances, or discharge  ENMT:  No difficulty hearing, tinnitus, vertigo; No sinus or throat pain  NECK: No pain or stiffness  RESPIRATORY: No cough, wheezing, chills or hemoptysis; No shortness of breath  CARDIOVASCULAR: No chest pain, palpitations, dizziness, or leg swelling  GASTROINTESTINAL: No abdominal or epigastric pain. No nausea, vomiting, or hematemesis; No diarrhea or constipation. No melena or hematochezia.  GENITOURINARY: No dysuria, frequency, hematuria, or incontinence  NEUROLOGICAL: No headaches, memory loss, loss of strength, numbness, or tremors  MUSCULOSKELETAL: No muscle or back pain      PAST MEDICAL & SURGICAL HISTORY:  JOSE RAUL (obstructive sleep apnea): has not yet started CPAP  Osteoarthritis of right hip  Morbid obesity with BMI of 50.0-59.9, adult: BMI 52.3  Chronic venous insufficiency  Glaucoma  Hyperparathyroidism: being monitored by endocrinologist  Rosacea  Migraines  Allergic rhinitis  GERD (gastroesophageal reflux disease)  Raynauds disease  Hashimoto's thyroiditis  Vertigo  HTN (hypertension)  History of total hip replacement, left: 2020  History of pterygium excision: left eye 2005  Tibialis posterior tendon rupture: repair and foot surgery  left 2012  S/P hysterectomy: 2002  Status post trigger finger release: right thumb and middle finger 2011  S/P carpal tunnel release: right 2009  Cyst of salivary gland: excision 1989  Adenoma of left breast: excision 1997  S/P excision of neuroma: left ankle 1988  S/P ankle ligament repair: left ankle 1985      SOCIAL HISTORY:  Negative for Tobacco, EtOH and Illicit Drugs    Allergies    adhesives (Rash)  Cardizem (Rash)  Fish Products (Rash)  Flonase (Swelling)  Levaquin (Rash)  lobster (Rash)    Intolerances        MEDICATIONS  (STANDING):  lactated ringers. 1000 milliLiter(s) (75 mL/Hr) IV Continuous <Continuous>    MEDICATIONS  (PRN):  HYDROmorphone  Injectable 0.5 milliGRAM(s) IV Push every 10 minutes PRN Moderate Pain (4 - 6)  ondansetron Injectable 4 milliGRAM(s) IV Push once PRN Nausea and/or Vomiting      FAMILY HISTORY:  FH: HTN (hypertension): sisters  Family history of pheochromocytoma: sister  Family history of breast cancer in sister: HTN  FH: HTN (hypertension): TIA; Mother  Family history of colon cancer in father: prostate cancer , HTN  Family history of early CAD: CABG father      Vital Signs Last 24 Hrs  T(C): 36.8 (20 May 2020 10:10), Max: 37 (20 May 2020 07:24)  T(F): 98.2 (20 May 2020 10:10), Max: 98.6 (20 May 2020 07:24)  HR: 42 (20 May 2020 10:51) (42 - 72)  BP: 113/65 (20 May 2020 10:51) (98/60 - 175/81)  BP(mean): --  RR: 18 (20 May 2020 10:51) (16 - 18)  SpO2: 97% (20 May 2020 10:51) (96% - 98%)    PHYSICAL EXAM:    GENERAL: NAD, well-developed  HEAD:  Atraumatic, Normocephalic  EYES: EOMI, PERRLA, conjunctiva and sclera clear  ENMT: No tonsillar erythema, exudates, or enlargement; Moist mucous membranes, Good dentition, No lesions  NECK: Supple, No JVD, Normal thyroid  NERVOUS SYSTEM:  Alert & Oriented X3, Good concentration;  CHEST/LUNG: Clear to auscultation bilaterally; No rales, rhonchi, wheezing, or rubs  HEART: Regular rate and rhythm; No murmurs, rubs, or gallops  ABDOMEN: Soft, Nontender, Nondistended; Bowel sounds present  EXTREMITIES:  2+ Peripheral Pulses, No clubbing, cyanosis, or edema      LABS:                        13.6   8.25  )-----------( 334      ( 20 May 2020 07:04 )             41.9     05-20    138  |  102  |  16  ----------------------------<  115<H>  4.2   |  28  |  0.68    Ca    11.4<H>      20 May 2020 07:04    TPro  8.2  /  Alb  3.7  /  TBili  0.4  /  DBili  x   /  AST  30  /  ALT  46  /  AlkPhos  125<H>  05-20    PT/INR - ( 20 May 2020 07:04 )   PT: 12.4 sec;   INR: 1.11 ratio         PTT - ( 20 May 2020 07:04 )  PTT:32.2 sec    CAPILLARY BLOOD GLUCOSE          RADIOLOGY & ADDITIONAL STUDIES:    EKG:   Personally Reviewed:  [ ] YES     Imaging:   Personally Reviewed:  [ ] YES     Consultant(s) Notes Reviewed:      Care Discussed with Consultants/Other Providers:

## 2020-05-20 NOTE — CONSULT NOTE ADULT - ASSESSMENT
64F with HTN, hypothyroidism, hyperparathyroidism, chronic venous insufficiency, Glaucoma and JOSE RAUL admitted for aftercare following R. THR.     S/P Right THR  POD 0  Continue Bowel and pain control regimen.   Incentive Spirometer for lung expansion.  Work with PT to increase ambulation as per orthopedics.  Monitor Hgb and follow up electrolytes.   Orhtopedics on board and following     HTN  Controlled.   Will resume Norvasc and Metoprolol  Resume ARB POD 2    Hypothyroidism  Synthroid Daily    Hyperparathyroidism  Being monitored by Endocrine outpatient     Glaucoma   Resume drops    JOSE RAUL  Sleep Study 3/20 but hasn't started CPAP     Diet  Regular    DVT Prophylaxis  Eliquis    Disposition  Full Code/Inpatient  Discharge planning pending hospital course

## 2020-05-20 NOTE — DISCHARGE NOTE PROVIDER - NSDCFUADDINST_GEN_ALL_CORE_FT
Physical Therapy/Occupational Therapy for: Ambulation, transfers, stairs, & ADLs, isometrics.   Full weight bearing on both legs; Walker/cane use as instructed by Physical therapy/Occupational therapy. Anterior Total Hip Replacement precautions for  6 weeks: No straight leg raise; No external rotation of hip when extended-standing or lying flat; No hyperextension of hip when standing (kickback).   Ice packs to hip for 30 min. every 3 hours and after physical therapy.   Keep incision clean and dry. May shower 5 days after surgery if no drainage from incision.  Prineo tape/suture removal on/near after Post Op Day # 14 in Surgeon's office.  • Do not take a tub bath yet.   • Do not resume driving until you have your surgeon’s permission. - Call your doctor if you experience:  • An increase in pain not controlled by pain medication or change in activity or  position.  • Temperature greater than 101° F.  • Redness, increased swelling or foul smelling drainage from or around the  incision.  • Numbness, tingling or a change in color or temperature of the operative leg.  • Call your doctor immediately if you experience chest pain, shortness of breath or calf pain.

## 2020-05-20 NOTE — DISCHARGE NOTE PROVIDER - NSDCFUSCHEDAPPT_GEN_ALL_CORE_FT
HARIS COHEN ; 06/04/2020 ; [address]  HARIS COHEN ; 07/21/2020 ; [facility] Marty [address] [place] [address] HARIS COHEN ; 06/04/2020 ; [address]  HARIS COHEN ; 07/21/2020 ; [facility] Marty [address] [place] [address] HARIS COHEN ; 06/04/2020 ; [address]  HARIS COHEN ; 07/21/2020 ; [facility] Marty [address] [place] [address] HARIS COHEN ; 06/04/2020 ; [address]  HARIS COHEN ; 07/21/2020 ; [facility] Marty [address] [place] [address] HARIS COHEN ; 06/04/2020 ; [address]  HARIS COHEN ; 07/21/2020 ; \[facility]\"" Marty [address] [place] [address] HARIS COHEN ; 06/04/2020 ; [address]  HARIS COHEN ; 07/21/2020 ; [facility] Marty [address] [place] [address]

## 2020-05-20 NOTE — DISCHARGE NOTE PROVIDER - CARE PROVIDER_API CALL
Mari Harris  [address] [place] [place] [address]  Phone: [phone]  Fax: (   )    -  Follow Up Time: Mari Harris  [address] [place] [address]  Phone: [phone]  Fax: (   )    -  Established Patient  Scheduled Appointment: 06/04/2020 11:00 AM

## 2020-05-20 NOTE — OCCUPATIONAL THERAPY INITIAL EVALUATION ADULT - ADDITIONAL COMMENTS
Lives with adult children.  Has 3 steps to enter. 13 inside. Tub Lives with adult children.  Has 3 steps to enter. 13 inside. Tub Pt owns a TTB, commode and hip kit

## 2020-05-20 NOTE — PHYSICAL THERAPY INITIAL EVALUATION ADULT - GAIT TRAINING, PT EVAL
ambulate with a RW for 150 feet independently in 1-2 days. 3 steps HR and hand-held assist in 1-2 days

## 2020-05-20 NOTE — BRIEF OPERATIVE NOTE - NSICDXBRIEFPREOP_GEN_ALL_CORE_FT
PRE-OP DIAGNOSIS:  Primary localized osteoarthritis of right hip 20-May-2020 09:46:20  Gurjit Mccormick

## 2020-05-20 NOTE — PROGRESS NOTE ADULT - SUBJECTIVE AND OBJECTIVE BOX
SUBJECTIVE: Patient seen and examined. No nausea/vomiting, nor shortness of breath    OBJECTIVE:     Vital Signs Last 24 Hrs  T(C): 36.8 (20 May 2020 10:10), Max: 37 (20 May 2020 07:24)  T(F): 98.2 (20 May 2020 10:10), Max: 98.6 (20 May 2020 07:24)  HR: 59 (20 May 2020 11:25) (42 - 72)  BP: 133/77 (20 May 2020 11:25) (98/60 - 175/81)  BP(mean): --  RR: 18 (20 May 2020 11:25) (16 - 19)  SpO2: 97% (20 May 2020 11:25) (96% - 98%)    PAIN SCORE:    8     SCALE USED: (1-10 VNRS)        Affected extremity:          Dressing: clean/dry/intact post operative dressing. Supple right thigh, not tense.            Sensation:  intact to light touch of bilateral feet. Motor is intact with PAS present to bilateral feet         Motor exam:          5/ 5 Tibialis Anterior/Gastrocnemius-Soleus , EHL         warm well perfused; capillary refill <3 seconds     LABS:                        13.6   8.25  )-----------( 334      ( 20 May 2020 07:04 )             41.9     05-20    138  |  102  |  16  ----------------------------<  115<H>  4.2   |  28  |  0.68    Ca    11.4<H>      20 May 2020 07:04    TPro  8.2  /  Alb  3.7  /  TBili  0.4  /  DBili  x   /  AST  30  /  ALT  46  /  AlkPhos  125<H>  05-20    PT/INR - ( 20 May 2020 07:04 )   PT: 12.4 sec;   INR: 1.11 ratio         PTT - ( 20 May 2020 07:04 )  PTT:32.2 sec  CAPILLARY BLOOD GLUCOSE          MEDICATIONS:    Anticoagulation: Eliquis for DVT prophylaxis to start in the am      Antibiotics: Ancef of 2 doses      Pain medications:   HYDROmorphone  Injectable 0.5 milliGRAM(s) IV Push every 10 minutes PRN  ondansetron Injectable 4 milliGRAM(s) IV Push once PRN      A/P :  s/p Right Anterior THR POD # 0  -    Pain control  -    DVT ppx: Eliquis  -    Check AM labs, todays post operative labs are noted  -    Weight bearing status: WBAT   -    Physical Therapy  -    Dispo: Home

## 2020-05-20 NOTE — DISCHARGE NOTE PROVIDER - PROVIDER TOKENS
FREE:[LAST:[Steven],FIRST:[Mari],PHONE:[phone]],FAX:[(   )    -],ADDRESS:[[address] [place] [address]]] FREE:[LAST:[Steven],FIRST:[Ayal],PHONE:[phone]],FAX:[(   )    -],ADDRESS:[[address] [place] [address]],SCHEDULEDAPPT:[06/04/2020],SCHEDULEDAPPTTIME:[11:00 AM],ESTABLISHEDPATIENT:[T]]

## 2020-05-20 NOTE — DISCHARGE NOTE PROVIDER - INSTRUCTIONS
For Constipation :   • Increase your water intake. Drink at least 8 glasses of water daily.  • Try adding fiber to your diet by eating fruits, vegetables and foods that are rich in grains.  • If you do experience constipation, you may take an over-the-counter stool softener/laxative such as Verenice Colace, Senekot or  Milk of Magnesia.

## 2020-05-20 NOTE — BRIEF OPERATIVE NOTE - NSICDXBRIEFPROCEDURE_GEN_ALL_CORE_FT
PROCEDURES:  THR (total hip replacement) 20-May-2020 09:45:54 Right Anterior approach Gurjit Mccormick

## 2020-05-20 NOTE — DISCHARGE NOTE PROVIDER - NSDCCPCAREPLAN_GEN_ALL_CORE_FT
PRINCIPAL DISCHARGE DIAGNOSIS  Diagnosis: Status post THR (total hip replacement)  Assessment and Plan of Treatment: PRINCIPAL DISCHARGE DIAGNOSIS  Diagnosis: Status post THR (total hip replacement)  Assessment and Plan of Treatment: right anterior LELA  Physical Therapy/Occupational Therapy for: Ambulation, transfers, stairs, & ADLs, isometrics.   Full weight bearing on both legs; Walker/cane use as instructed by Physical therapy/Occupational therapy. Anterior Total Hip Replacement precautions for  6 weeks: No straight leg raise; No external rotation of hip when extended-standing or lying flat; No hyperextension of hip when standing (kickback).   Ice packs to hip for 30 min. every 3 hours and after physical therapy.   Keep incision clean and dry. May shower 5 days after surgery if no drainage from incision.  Kassie dressing may be removed in 7 days or when battery pack stops working.  Suture removal on/near after Post Op Day # 14 in Surgeon's office.  • Do not take a tub bath yet.   • Do not resume driving until you have your surgeon’s permission.

## 2020-05-20 NOTE — DISCHARGE NOTE PROVIDER - NSDCMRMEDTOKEN_GEN_ALL_CORE_FT
Avapro 300 mg oral tablet: 1 tab(s) orally once a day  latanoprost 0.005% ophthalmic solution: 1 drop(s) to each affected eye once a day (in the evening)  meclizine 25 mg oral tablet:   metoprolol tartrate 100 mg oral tablet: 1 tab(s) orally 2 times a day  mupirocin 2% topical ointment: Apply topically to affected area 2 times a day to nostrils  Norvasc 5 mg oral tablet: 1 tab(s) orally once a day  Pataday 0.2% ophthalmic solution: 1 drop(s) to each affected eye once a day, As Needed  ProAir HFA 90 mcg/inh inhalation aerosol: 2 puff(s) inhaled 4 times a day, As Needed  Synthroid 100 mcg (0.1 mg) oral tablet: 1 tab(s) orally once a week 7th day   Synthroid 50 mcg (0.05 mg) oral tablet: 1 tab(s) orally once a day for 6  days   ZyrTEC 10 mg oral tablet: 1 tab(s) orally once a day acetaminophen 500 mg oral tablet: 2 tab(s) orally every 8 hours  apixaban 2.5 mg oral tablet: 1 tab orally every 12 hours  apixaban 2.5 mg oral tablet: 1 tab orally every 12 hours  Avapro 300 mg oral tablet: 1 tab(s) orally once a day  celecoxib 100 mg oral capsule: 1 cap orally every 12 hours (21 days for HO ppx)  latanoprost 0.005% ophthalmic solution: 1 drop(s) to each affected eye once a day (in the evening)  meclizine 25 mg oral tablet:   metoprolol tartrate 100 mg oral tablet: 1 tab(s) orally 2 times a day  mupirocin 2% topical ointment: Apply topically to affected area 2 times a day to nostrils  Norvasc 5 mg oral tablet: 1 tab(s) orally once a day  omeprazole 20 mg oral delayed release capsule: 1 cap(s) orally once a day   Pataday 0.2% ophthalmic solution: 1 drop(s) to each affected eye once a day, As Needed  polyethylene glycol 3350 oral powder for reconstitution: 17 gram(s) orally once a day  ProAir HFA 90 mcg/inh inhalation aerosol: 2 puff(s) inhaled 4 times a day, As Needed  senna oral tablet: 2 tab(s) orally once a day (at bedtime)  Synthroid 100 mcg (0.1 mg) oral tablet: 1 tab(s) orally once a week 7th day   Synthroid 50 mcg (0.05 mg) oral tablet: 1 tab(s) orally once a day for 6  days   ZyrTEC 10 mg oral tablet: 1 tab(s) orally once a day acetaminophen 500 mg oral tablet: 2 tab(s) orally every 8 hours  apixaban 2.5 mg oral tablet: 1 tab orally every 12 hours  apixaban 2.5 mg oral tablet: 1 tab orally every 12 hours  Avapro 300 mg oral tablet: 1 tab(s) orally once a day  celecoxib 100 mg oral capsule: 1 cap orally every 12 hours (21 days for HO ppx)  latanoprost 0.005% ophthalmic solution: 1 drop(s) to each affected eye once a day (in the evening)  meclizine 25 mg oral tablet:   metoprolol tartrate 100 mg oral tablet: 1 tab(s) orally 2 times a day  Norvasc 5 mg oral tablet: 1 tab(s) orally once a day  omeprazole 20 mg oral delayed release capsule: 1 cap(s) orally once a day   oxyCODONE 5 mg oral tablet: 1 tab(s) orally every 4 hours, As Needed for pain MDD:6   [id number]   Pataday 0.2% ophthalmic solution: 1 drop(s) to each affected eye once a day, As Needed  polyethylene glycol 3350 oral powder for reconstitution: 17 gram(s) orally once a day  ProAir HFA 90 mcg/inh inhalation aerosol: 2 puff(s) inhaled 4 times a day, As Needed  senna oral tablet: 2 tab(s) orally once a day (at bedtime)  Synthroid 100 mcg (0.1 mg) oral tablet: 1 tab(s) orally once a week 7th day   Synthroid 50 mcg (0.05 mg) oral tablet: 1 tab(s) orally once a day for 6  days   ZyrTEC 10 mg oral tablet: 1 tab(s) orally once a day acetaminophen 500 mg oral tablet: 2 tab(s) orally every 8 hours  apixaban 2.5 mg oral tablet: 1 tab orally every 12 hours  apixaban 2.5 mg oral tablet: 1 tab orally every 12 hours  Avapro 300 mg oral tablet: 1 tab(s) orally once a day  celecoxib 100 mg oral capsule: 1 cap orally every 12 hours (21 days for HO ppx)  latanoprost 0.005% ophthalmic solution: 1 drop(s) to each affected eye once a day (in the evening)  meclizine 25 mg oral tablet: orally every 8 hours, As Needed  Metoprolol Succinate  mg oral tablet, extended release: 1 tab(s) orally 2 times a day  Norvasc 5 mg oral tablet: 1 tab(s) orally once a day  omeprazole 20 mg oral delayed release capsule: 1 cap(s) orally once a day   oxyCODONE 5 mg oral tablet: 1 tab(s) orally every 4 hours, As Needed for pain MDD:6   [id number]   Pataday 0.2% ophthalmic solution: 1 drop(s) to each affected eye once a day, As Needed  polyethylene glycol 3350 oral powder for reconstitution: 17 gram(s) orally once a day  ProAir HFA 90 mcg/inh inhalation aerosol: 2 puff(s) inhaled 4 times a day, As Needed  senna oral tablet: 2 tab(s) orally once a day (at bedtime)  Synthroid 100 mcg (0.1 mg) oral tablet: 1 tab(s) orally once a week 7th day   Synthroid 50 mcg (0.05 mg) oral tablet: 1 tab(s) orally once a day for 6  days   ZyrTEC 10 mg oral tablet: 1 tab(s) orally once a day acetaminophen 500 mg oral tablet: 2 tab(s) orally every 8 hours  apixaban 2.5 mg oral tablet: 1 tab orally every 12 hours  apixaban 2.5 mg oral tablet: 1 tab orally every 12 hours  Avapro 300 mg oral tablet: 1 tab(s) orally once a day  celecoxib 100 mg oral capsule: 1 cap orally every 12 hours (21 days for HO ppx)  latanoprost 0.005% ophthalmic solution: 1 drop(s) to each affected eye once a day (in the evening)  meclizine 25 mg oral tablet: orally every 8 hours, As Needed  metoprolol tartrate 25 mg oral tablet: 1 tab(s) orally every 12 hours  Norvasc 5 mg oral tablet: 1 tab(s) orally once a day  omeprazole 20 mg oral delayed release capsule: 1 cap(s) orally once a day   oxyCODONE 5 mg oral tablet: 1 tab(s) orally every 4 hours, As Needed for pain MDD:6   [id number]   Pataday 0.2% ophthalmic solution: 1 drop(s) to each affected eye once a day, As Needed  polyethylene glycol 3350 oral powder for reconstitution: 17 gram(s) orally once a day  ProAir HFA 90 mcg/inh inhalation aerosol: 2 puff(s) inhaled 4 times a day, As Needed  senna oral tablet: 2 tab(s) orally once a day (at bedtime)  Synthroid 100 mcg (0.1 mg) oral tablet: 1 tab(s) orally once a week 7th day   Synthroid 50 mcg (0.05 mg) oral tablet: 1 tab(s) orally once a day for 6  days   ZyrTEC 10 mg oral tablet: 1 tab(s) orally once a day

## 2020-05-21 LAB
ANION GAP SERPL CALC-SCNC: 5 MMOL/L — SIGNIFICANT CHANGE UP (ref 5–17)
BUN SERPL-MCNC: 15 MG/DL — SIGNIFICANT CHANGE UP (ref 7–23)
CALCIUM SERPL-MCNC: 9.8 MG/DL — SIGNIFICANT CHANGE UP (ref 8.4–10.5)
CHLORIDE SERPL-SCNC: 100 MMOL/L — SIGNIFICANT CHANGE UP (ref 96–108)
CO2 SERPL-SCNC: 27 MMOL/L — SIGNIFICANT CHANGE UP (ref 22–31)
CREAT SERPL-MCNC: 0.72 MG/DL — SIGNIFICANT CHANGE UP (ref 0.5–1.3)
GLUCOSE SERPL-MCNC: 100 MG/DL — HIGH (ref 70–99)
HCT VFR BLD CALC: 31.8 % — LOW (ref 34.5–45)
HGB BLD-MCNC: 10.3 G/DL — LOW (ref 11.5–15.5)
MCHC RBC-ENTMCNC: 29.9 PG — SIGNIFICANT CHANGE UP (ref 27–34)
MCHC RBC-ENTMCNC: 32.4 GM/DL — SIGNIFICANT CHANGE UP (ref 32–36)
MCV RBC AUTO: 92.2 FL — SIGNIFICANT CHANGE UP (ref 80–100)
NRBC # BLD: 0 /100 WBCS — SIGNIFICANT CHANGE UP (ref 0–0)
PLATELET # BLD AUTO: 251 K/UL — SIGNIFICANT CHANGE UP (ref 150–400)
POTASSIUM SERPL-MCNC: 4.4 MMOL/L — SIGNIFICANT CHANGE UP (ref 3.5–5.3)
POTASSIUM SERPL-SCNC: 4.4 MMOL/L — SIGNIFICANT CHANGE UP (ref 3.5–5.3)
RBC # BLD: 3.45 M/UL — LOW (ref 3.8–5.2)
RBC # FLD: 12.7 % — SIGNIFICANT CHANGE UP (ref 10.3–14.5)
SODIUM SERPL-SCNC: 132 MMOL/L — LOW (ref 135–145)
WBC # BLD: 8.61 K/UL — SIGNIFICANT CHANGE UP (ref 3.8–10.5)
WBC # FLD AUTO: 8.61 K/UL — SIGNIFICANT CHANGE UP (ref 3.8–10.5)

## 2020-05-21 PROCEDURE — 93010 ELECTROCARDIOGRAM REPORT: CPT

## 2020-05-21 PROCEDURE — 99233 SBSQ HOSP IP/OBS HIGH 50: CPT

## 2020-05-21 RX ORDER — LEVOTHYROXINE SODIUM 125 MCG
50 TABLET ORAL
Refills: 0 | Status: DISCONTINUED | OUTPATIENT
Start: 2020-05-21 | End: 2020-05-22

## 2020-05-21 RX ORDER — MECLIZINE HCL 12.5 MG
0 TABLET ORAL
Qty: 0 | Refills: 0 | DISCHARGE

## 2020-05-21 RX ORDER — OXYCODONE HYDROCHLORIDE 5 MG/1
1 TABLET ORAL
Qty: 42 | Refills: 0
Start: 2020-05-21 | End: 2020-05-27

## 2020-05-21 RX ORDER — OMEPRAZOLE 10 MG/1
1 CAPSULE, DELAYED RELEASE ORAL
Qty: 30 | Refills: 1
Start: 2020-05-21 | End: 2020-07-19

## 2020-05-21 RX ORDER — OXYCODONE HYDROCHLORIDE 5 MG/1
1 TABLET ORAL
Qty: 0 | Refills: 0 | DISCHARGE
Start: 2020-05-21

## 2020-05-21 RX ORDER — ACETAMINOPHEN 500 MG
2 TABLET ORAL
Qty: 0 | Refills: 0 | DISCHARGE
Start: 2020-05-21

## 2020-05-21 RX ORDER — LEVOTHYROXINE SODIUM 125 MCG
100 TABLET ORAL
Refills: 0 | Status: DISCONTINUED | OUTPATIENT
Start: 2020-05-21 | End: 2020-05-22

## 2020-05-21 RX ORDER — APIXABAN 2.5 MG/1
1 TABLET, FILM COATED ORAL
Qty: 60 | Refills: 0
Start: 2020-05-21 | End: 2020-06-19

## 2020-05-21 RX ORDER — METOPROLOL TARTRATE 50 MG
1 TABLET ORAL
Qty: 0 | Refills: 0 | DISCHARGE

## 2020-05-21 RX ORDER — APIXABAN 2.5 MG/1
1 TABLET, FILM COATED ORAL
Qty: 9 | Refills: 0
Start: 2020-05-21 | End: 2020-05-25

## 2020-05-21 RX ORDER — CELECOXIB 200 MG/1
1 CAPSULE ORAL
Qty: 60 | Refills: 0
Start: 2020-05-21 | End: 2020-06-19

## 2020-05-21 RX ADMIN — APIXABAN 2.5 MILLIGRAM(S): 2.5 TABLET, FILM COATED ORAL at 21:00

## 2020-05-21 RX ADMIN — CELECOXIB 100 MILLIGRAM(S): 200 CAPSULE ORAL at 08:53

## 2020-05-21 RX ADMIN — OXYCODONE HYDROCHLORIDE 10 MILLIGRAM(S): 5 TABLET ORAL at 09:54

## 2020-05-21 RX ADMIN — CELECOXIB 100 MILLIGRAM(S): 200 CAPSULE ORAL at 21:00

## 2020-05-21 RX ADMIN — Medication 1000 MILLIGRAM(S): at 03:31

## 2020-05-21 RX ADMIN — OXYCODONE HYDROCHLORIDE 10 MILLIGRAM(S): 5 TABLET ORAL at 06:20

## 2020-05-21 RX ADMIN — POLYETHYLENE GLYCOL 3350 17 GRAM(S): 17 POWDER, FOR SOLUTION ORAL at 21:01

## 2020-05-21 RX ADMIN — LATANOPROST 1 DROP(S): 0.05 SOLUTION/ DROPS OPHTHALMIC; TOPICAL at 21:00

## 2020-05-21 RX ADMIN — APIXABAN 2.5 MILLIGRAM(S): 2.5 TABLET, FILM COATED ORAL at 08:53

## 2020-05-21 RX ADMIN — Medication 50 MICROGRAM(S): at 06:46

## 2020-05-21 RX ADMIN — SODIUM CHLORIDE 100 MILLILITER(S): 9 INJECTION, SOLUTION INTRAVENOUS at 03:32

## 2020-05-21 RX ADMIN — PANTOPRAZOLE SODIUM 40 MILLIGRAM(S): 20 TABLET, DELAYED RELEASE ORAL at 06:11

## 2020-05-21 RX ADMIN — Medication 1000 MILLIGRAM(S): at 18:43

## 2020-05-21 RX ADMIN — ONDANSETRON 4 MILLIGRAM(S): 8 TABLET, FILM COATED ORAL at 09:54

## 2020-05-21 RX ADMIN — OXYCODONE HYDROCHLORIDE 5 MILLIGRAM(S): 5 TABLET ORAL at 20:49

## 2020-05-21 NOTE — CONSULT NOTE ADULT - ASSESSMENT
The patient is a 64 year old female with a history of HTN, hypothyroidism, hyperparathyroidism, JOSE RAUL who is admitted s/p right THR.    Plan:  - ECG and telemetry consistent with sinus bradycardia; no other bradyarrhythmias noted  - Symptoms of fatigue and lightheadedness likely multifactorial from surgery, pain medication, bradycardia  - Hold metoprolol; if heart rates increase, will attempt to re-introduce at a lower dose  - TSH pending  - Losartan and amlodipine ordered with holding parameters; if BP remains on lower side will temporarily discontinue  - Pain control; minimize narcotics if possible  - On CPAP for JOSE RAUL

## 2020-05-21 NOTE — PROGRESS NOTE ADULT - SUBJECTIVE AND OBJECTIVE BOX
Procedure: Right  Anterior THR  POD#: 1    S: Pt without complaints. No SOB,CP, N/V. Tolerated Fluids / Diet well.   Pain comfortable on Interval Rx  + Tylenol + Celebrex. No BM yet  + flatus, No abdominal pain. + voiding  Pain Rx:   acetaminophen   Tablet .. 1000 milliGRAM(s) Oral every 8 hours  celecoxib 100 milliGRAM(s) Oral every 12 hours  HYDROmorphone  Injectable 0.5 milliGRAM(s) IV Push every 3 hours PRN  meclizine 25 milliGRAM(s) Oral every 6 hours PRN  ondansetron Injectable 4 milliGRAM(s) IV Push every 6 hours PRN  oxyCODONE    IR 5 milliGRAM(s) Oral every 3 hours PRN  oxyCODONE    IR 10 milliGRAM(s) Oral every 3 hours PRN    O: General: Pt Alert and oriented, On exam NAD,   VS: Vital Signs Last 24 Hrs  T(C): 36.4 (21 May 2020 07:39), Max: 36.8 (20 May 2020 10:10)  T(F): 97.5 (21 May 2020 07:39), Max: 98.2 (20 May 2020 10:10)  HR: 60 (21 May 2020 07:39) (41 - 94)  BP: 118/69 (21 May 2020 07:39) (98/60 - 146/52)  BP(mean): --  RR: 20 (21 May 2020 07:39) (15 - 20)  SpO2: 99% (21 May 2020 07:39) (95% - 100%)  Heart: RRR  Lungs: BS clear bilat.  Abdomen: Soft; no distention, benign exam    Ext: Right Ant. Hip w/ violet dressing dry/intact  Neurologic: Has sensation bilat. feet & toes ;  Full AROM bilat feet & toes. EHL / AT  = Bilat: 5/5 ; Sensation Present mid lateral thigh  Vascular: Feet toes warm, pink. DP = 2+. Calves soft ; w/o tenderness bilat..  VTEP: On Bilat. Venodynes +   apixaban 2.5 milliGRAM(s) Oral every 12 hours    H.O Prophylaxis: preop radiation  Activity in PT Noted.  Walked 10' w/ rolling walker                          10.3   8.61  )-----------( 251      ( 21 May 2020 06:22 )             31.8     05-21    132<L>  |  100  |  15  ----------------------------<  100<H>  4.4   |  27  |  0.72    Ca    9.8      21 May 2020 06:22    TPro  8.2  /  Alb  3.7  /  TBili  0.4  /  DBili  x   /  AST  30  /  ALT  46  /  AlkPhos  125<H>  05-20      Hospitalist input noted    Primary Orthopedic Assessment:  • Stable from Orthopedic perspective  • Neuro motor exam stable:   • Labs: stable, post op anemia well tolerated, mild hyponatremia      Plan:   • Continue:  PT/OT/Weightbearing as tolerated with assistance of a walker/Anterior THR precautions/Ice to hip/          Incentive spirometry encouraged  • Continue DVT prophylaxis as prescribed, including use of compression devices and ankle pumps  • Continue Pain Rx  • Anterior hip precautions reviewed with patient  • Plans per Medicine  • Discharge planning – anticipated discharge is Home D/C with home care & home PT when medically stable & cleared by PT/OT--today or tomorrow

## 2020-05-21 NOTE — PROGRESS NOTE ADULT - SUBJECTIVE AND OBJECTIVE BOX
Subjective: Patient seen and examined.  Was a bit SOB last night.  Used CPAP at night.     MEDICATIONS  (STANDING):  acetaminophen   Tablet .. 1000 milliGRAM(s) Oral every 8 hours  apixaban 2.5 milliGRAM(s) Oral every 12 hours  celecoxib 100 milliGRAM(s) Oral every 12 hours  ketotifen 0.025% Ophthalmic Solution 1 Drop(s) Both EYES daily  lactated ringers. 1000 milliLiter(s) (100 mL/Hr) IV Continuous <Continuous>  latanoprost 0.005% Ophthalmic Solution 1 Drop(s) Both EYES at bedtime  levothyroxine 50 MICROGram(s) Oral <User Schedule>  levothyroxine 100 MICROGram(s) Oral <User Schedule>  loratadine 10 milliGRAM(s) Oral daily  metoprolol tartrate 100 milliGRAM(s) Oral two times a day  pantoprazole    Tablet 40 milliGRAM(s) Oral before breakfast  polyethylene glycol 3350 17 Gram(s) Oral daily    MEDICATIONS  (PRN):  ALBUTerol    90 MICROgram(s) HFA Inhaler 2 Puff(s) Inhalation every 4 hours PRN Shortness of Breath and/or Wheezing  aluminum hydroxide/magnesium hydroxide/simethicone Suspension 30 milliLiter(s) Oral four times a day PRN Indigestion  HYDROmorphone  Injectable 0.5 milliGRAM(s) IV Push every 3 hours PRN Severe Pain (7 - 10)  magnesium hydroxide Suspension 30 milliLiter(s) Oral daily PRN Constipation  meclizine 25 milliGRAM(s) Oral every 6 hours PRN Dizziness  ondansetron Injectable 4 milliGRAM(s) IV Push every 6 hours PRN Nausea and/or Vomiting  oxyCODONE    IR 5 milliGRAM(s) Oral every 3 hours PRN Mild Pain (1 - 3)  oxyCODONE    IR 10 milliGRAM(s) Oral every 3 hours PRN Moderate Pain (4 - 6)  senna 2 Tablet(s) Oral at bedtime PRN Constipation      Allergies    adhesives (Rash)  Cardizem (Rash)  Fish Products (Rash)  Flonase (Swelling)  Levaquin (Rash)  lobster (Rash)    Intolerances        Vital Signs Last 24 Hrs  T(C): 36.4 (21 May 2020 07:39), Max: 36.8 (20 May 2020 23:25)  T(F): 97.5 (21 May 2020 07:39), Max: 98.2 (20 May 2020 23:25)  HR: 60 (21 May 2020 07:39) (41 - 94)  BP: 118/69 (21 May 2020 07:39) (117/71 - 146/52)  BP(mean): --  RR: 20 (21 May 2020 07:39) (15 - 20)  SpO2: 99% (21 May 2020 07:39) (95% - 100%)    PHYSICAL EXAM:  GENERAL: NAD, well-groomed, well-developed  HEAD:  Atraumatic, Normocephalic  ENMT: Moist mucous membranes,   NECK: Supple, No JVD, Normal thyroid  NERVOUS SYSTEM:  All 4 extremities mobile, no gross sensory deficits.   CHEST/LUNG: Clear to auscultation bilaterally; No rales, rhonchi, wheezing, or rubs  HEART: Regular rate and rhythm; No murmurs, rubs, or gallops  ABDOMEN: Soft, Nontender, Nondistended; Bowel sounds present  EXTREMITIES:  2+ Peripheral Pulses, No clubbing, cyanosis, or edema      LABS:                        10.3   8.61  )-----------( 251      ( 21 May 2020 06:22 )             31.8     21 May 2020 06:22    132    |  100    |  15     ----------------------------<  100    4.4     |  27     |  0.72     Ca    9.8        21 May 2020 06:22      PT/INR - ( 20 May 2020 07:04 )   PT: 12.4 sec;   INR: 1.11 ratio         PTT - ( 20 May 2020 07:04 )  PTT:32.2 sec    CAPILLARY BLOOD GLUCOSE          RADIOLOGY & ADDITIONAL TESTS:    Imaging Personally Reviewed:  [ ] YES     Consultant(s) Notes Reviewed:      Care Discussed with Consultants/Other Providers:    Advanced Directives: [ ] DNR  [ ] No feeding tube  [ ] MOLST in chart  [ ] MOLST completed today  [ ] Unknown Subjective: Patient seen and examined.  Was a bit SOB last night. Bradycardic with HR in the 40-50 range. Used CPAP at night.     MEDICATIONS  (STANDING):  acetaminophen   Tablet .. 1000 milliGRAM(s) Oral every 8 hours  apixaban 2.5 milliGRAM(s) Oral every 12 hours  celecoxib 100 milliGRAM(s) Oral every 12 hours  ketotifen 0.025% Ophthalmic Solution 1 Drop(s) Both EYES daily  lactated ringers. 1000 milliLiter(s) (100 mL/Hr) IV Continuous <Continuous>  latanoprost 0.005% Ophthalmic Solution 1 Drop(s) Both EYES at bedtime  levothyroxine 50 MICROGram(s) Oral <User Schedule>  levothyroxine 100 MICROGram(s) Oral <User Schedule>  loratadine 10 milliGRAM(s) Oral daily  metoprolol tartrate 100 milliGRAM(s) Oral two times a day  pantoprazole    Tablet 40 milliGRAM(s) Oral before breakfast  polyethylene glycol 3350 17 Gram(s) Oral daily    MEDICATIONS  (PRN):  ALBUTerol    90 MICROgram(s) HFA Inhaler 2 Puff(s) Inhalation every 4 hours PRN Shortness of Breath and/or Wheezing  aluminum hydroxide/magnesium hydroxide/simethicone Suspension 30 milliLiter(s) Oral four times a day PRN Indigestion  HYDROmorphone  Injectable 0.5 milliGRAM(s) IV Push every 3 hours PRN Severe Pain (7 - 10)  magnesium hydroxide Suspension 30 milliLiter(s) Oral daily PRN Constipation  meclizine 25 milliGRAM(s) Oral every 6 hours PRN Dizziness  ondansetron Injectable 4 milliGRAM(s) IV Push every 6 hours PRN Nausea and/or Vomiting  oxyCODONE    IR 5 milliGRAM(s) Oral every 3 hours PRN Mild Pain (1 - 3)  oxyCODONE    IR 10 milliGRAM(s) Oral every 3 hours PRN Moderate Pain (4 - 6)  senna 2 Tablet(s) Oral at bedtime PRN Constipation      Allergies    adhesives (Rash)  Cardizem (Rash)  Fish Products (Rash)  Flonase (Swelling)  Levaquin (Rash)  lobster (Rash)    Intolerances        Vital Signs Last 24 Hrs  T(C): 36.4 (21 May 2020 07:39), Max: 36.8 (20 May 2020 23:25)  T(F): 97.5 (21 May 2020 07:39), Max: 98.2 (20 May 2020 23:25)  HR: 60 (21 May 2020 07:39) (41 - 94)  BP: 118/69 (21 May 2020 07:39) (117/71 - 146/52)  BP(mean): --  RR: 20 (21 May 2020 07:39) (15 - 20)  SpO2: 99% (21 May 2020 07:39) (95% - 100%)    PHYSICAL EXAM:  GENERAL: NAD, well-groomed, well-developed  HEAD:  Atraumatic, Normocephalic  ENMT: Moist mucous membranes,   NECK: Supple, No JVD, Normal thyroid  NERVOUS SYSTEM:  All 4 extremities mobile, no gross sensory deficits.   CHEST/LUNG: Clear to auscultation bilaterally; No rales, rhonchi, wheezing, or rubs  HEART: Regular rate and rhythm; No murmurs, rubs, or gallops  ABDOMEN: Soft, Nontender, Nondistended; Bowel sounds present  EXTREMITIES:  2+ Peripheral Pulses, No clubbing, cyanosis, or edema      LABS:                        10.3   8.61  )-----------( 251      ( 21 May 2020 06:22 )             31.8     21 May 2020 06:22    132    |  100    |  15     ----------------------------<  100    4.4     |  27     |  0.72     Ca    9.8        21 May 2020 06:22      PT/INR - ( 20 May 2020 07:04 )   PT: 12.4 sec;   INR: 1.11 ratio         PTT - ( 20 May 2020 07:04 )  PTT:32.2 sec    CAPILLARY BLOOD GLUCOSE          RADIOLOGY & ADDITIONAL TESTS:    Imaging Personally Reviewed:  [ ] YES     Consultant(s) Notes Reviewed:      Care Discussed with Consultants/Other Providers:    Advanced Directives: [ ] DNR  [ ] No feeding tube  [ ] MOLST in chart  [ ] MOLST completed today  [ ] Unknown

## 2020-05-21 NOTE — PROGRESS NOTE ADULT - ASSESSMENT
64F with HTN, hypothyroidism, hyperparathyroidism, chronic venous insufficiency, Glaucoma and JOSE RAUL admitted for aftercare following R. THR.     S/P Right THR  POD 1  Continue Bowel and pain control regimen.   Incentive Spirometer for lung expansion.  Work with PT to increase ambulation as per orthopedics.  Monitor Hgb and follow up electrolytes.   Orhtopedics on board and following     HTN  Controlled.   Will resume Norvasc and Metoprolol  Resume ARB POD 2    Hypothyroidism  Synthroid Daily    Hyperparathyroidism  Being monitored by Endocrine outpatient     Glaucoma   Resume drops    JOSE RAUL  Sleep Study 3/20 but hasn't started CPAP   CPAP at bedtime     Diet  Regular    DVT Prophylaxis  Eliquis    Disposition  Full Code/Inpatient  Discharge planning pending hospital course 64F with HTN, hypothyroidism, hyperparathyroidism, chronic venous insufficiency, Glaucoma and JOSE RAUL admitted for aftercare following R. THR.     S/P Right THR  POD 1  Continue Bowel and pain control regimen.   Incentive Spirometer for lung expansion.  Work with PT to increase ambulation as per orthopedics.  Monitor Hgb and follow up electrolytes.   Orhtopedics on board and following     Bradycardia  Patient complaining of extreme fatigue and somnolence along with light headedness; No syncope or chest pain  Difficult to say if this part of the recovery process and side effects from narcotics vs side effect of Metoprolol; Tolerated well in the past  Check 12 Lead EKG to rule out arrythmia   Will hold Metoprolol for now  Consult Cardiology     HTN  Controlled.   Norvasc but will hold Metoprolol  Resume ARB POD 2    Hypothyroidism  Synthroid Daily    Hyperparathyroidism  Being monitored by Endocrine outpatient     Glaucoma   Resume drops    JOSE RAUL  Sleep Study 3/20 but hasn't started CPAP   CPAP at bedtime     Diet  Regular    DVT Prophylaxis  Eliquis    Disposition  Full Code/Inpatient  Discharge planning pending hospital course 64F with HTN, hypothyroidism, hyperparathyroidism, chronic venous insufficiency, Glaucoma and JOSE RAUL admitted for aftercare following R. THR.     S/P Right THR  POD 1  Continue Bowel and pain control regimen.   Incentive Spirometer for lung expansion.  Work with PT to increase ambulation as per orthopedics.  Monitor Hgb and follow up electrolytes.   Orhtopedics on board and following     Bradycardia  Patient complaining of extreme fatigue and somnolence along with light headedness; No syncope or chest pain  Difficult to say if this part of the recovery process and side effects from narcotics vs side effect of Metoprolol; Tolerated well in the past  Check 12 Lead EKG to rule out arrythmia or cardiac event  Will hold Metoprolol for now  Consult Cardiology     HTN  Controlled.   Norvasc but will hold Metoprolol  Resume ARB POD 2    Hypothyroidism  Synthroid Daily    Hyperparathyroidism  Being monitored by Endocrine outpatient     Glaucoma   Resume drops    JOSE RAUL  Sleep Study 3/20 but hasn't started CPAP   CPAP at bedtime     Diet  Regular    DVT Prophylaxis  Eliquis    Disposition  Full Code/Inpatient  Discharge planning pending hospital course 64F with HTN, hypothyroidism, hyperparathyroidism, chronic venous insufficiency, Glaucoma and JOSE RAUL admitted for aftercare following R. THR.     S/P Right THR  POD 1  Continue Bowel and pain control regimen.   Incentive Spirometer for lung expansion.  Work with PT to increase ambulation as per orthopedics.  Monitor Hgb and follow up electrolytes.   Orhtopedics on board and following     Bradycardia  Patient complaining of extreme fatigue and somnolence along with light headedness; No syncope or chest pain  Difficult to say if this part of the recovery process and side effects from narcotics vs side effect of Metoprolol; Tolerated well in the past  Check 12 Lead EKG to rule out arrythmia or cardiac event; Check TSH  Will hold Metoprolol for now  Consult Cardiology     HTN  Controlled.   Norvasc but will hold Metoprolol  Resume ARB POD 2    Hypothyroidism  Synthroid Daily    Hyperparathyroidism  Being monitored by Endocrine outpatient     Glaucoma   Resume drops    JOSE RAUL  Sleep Study 3/20 but hasn't started CPAP   CPAP at bedtime     Diet  Regular    DVT Prophylaxis  Eliquis    Disposition  Full Code/Inpatient  Discharge planning pending hospital course

## 2020-05-21 NOTE — PROVIDER CONTACT NOTE (OTHER) - BACKGROUND
Admitted for osteoarthritis of right hip  s/p total right hip replacement 5/20/20  on Metoprolol 100 mg po 2 x a day

## 2020-05-21 NOTE — CONSULT NOTE ADULT - SUBJECTIVE AND OBJECTIVE BOX
History of Present Illness: The patient is a 64 year old female with a history of HTN, hypothyroidism, hyperparathyroidism, JOSE RAUL who is admitted s/p right THR. This morning a couple of hours after waking up she started to feel nauseous, lightheaded, fatigued. She received zofran which helped the nausea but continued to feel tired. She was able to walk with PT twice and did not feel lightheaded or short of breath with this. She has been noted to be bradycardic to the 40s at times; her metoprolol was held.    Past Medical/Surgical History:  HTN, hypothyroidism, hyperparathyroidism, JOSE RAUL    Medications:  Home Medications:  acetaminophen 500 mg oral tablet: 2 tab(s) orally every 8 hours (21 May 2020 11:59)  Avapro 300 mg oral tablet: 1 tab(s) orally once a day (21 May 2020 11:59)  latanoprost 0.005% ophthalmic solution: 1 drop(s) to each affected eye once a day (in the evening) (21 May 2020 11:59)  meclizine 25 mg oral tablet: orally every 8 hours, As Needed (21 May 2020 11:59)  Metoprolol Succinate  mg oral tablet, extended release: 1 tab(s) orally 2 times a day (21 May 2020 11:59)  Norvasc 5 mg oral tablet: 1 tab(s) orally once a day (21 May 2020 11:59)  Pataday 0.2% ophthalmic solution: 1 drop(s) to each affected eye once a day, As Needed (21 May 2020 11:59)  polyethylene glycol 3350 oral powder for reconstitution: 17 gram(s) orally once a day (21 May 2020 11:59)  ProAir HFA 90 mcg/inh inhalation aerosol: 2 puff(s) inhaled 4 times a day, As Needed (21 May 2020 11:59)  senna oral tablet: 2 tab(s) orally once a day (at bedtime) (21 May 2020 11:59)  Synthroid 100 mcg (0.1 mg) oral tablet: 1 tab(s) orally once a week 7th day  (21 May 2020 11:59)  Synthroid 50 mcg (0.05 mg) oral tablet: 1 tab(s) orally once a day for 6  days  (21 May 2020 11:59)  ZyrTEC 10 mg oral tablet: 1 tab(s) orally once a day (21 May 2020 11:59)      Family History: Non-contributory family history of premature cardiovascular atherosclerotic disease    Social History: No tobacco, alcohol or drug use    Review of Systems:  General: No fevers, chills, weight loss or gain  Skin: No rashes, color changes  Cardiovascular: No chest pain, orthopnea  Respiratory: No shortness of breath, cough  Gastrointestinal: No nausea, abdominal pain  Genitourinary: No incontinence, pain with urination  Musculoskeletal: No pain, swelling, decreased range of motion  Neurological: No headache, weakness  Psychiatric: No depression, anxiety  Endocrine: No weight loss or gain, increased thirst  All other systems are comprehensively negative.    Physical Exam:  Vitals:        Vital Signs Last 24 Hrs  T(C): 37 (21 May 2020 11:35), Max: 37 (21 May 2020 11:35)  T(F): 98.6 (21 May 2020 11:35), Max: 98.6 (21 May 2020 11:35)  HR: 59 (21 May 2020 11:35) (41 - 60)  BP: 101/59 (21 May 2020 11:35) (101/59 - 119/55)  BP(mean): --  RR: 18 (21 May 2020 11:35) (16 - 20)  SpO2: 100% (21 May 2020 11:35) (95% - 100%)  General: NAD  HEENT: MMM  Neck: No JVD, no carotid bruit  Lungs: CTAB  CV: RRR, nl S1/S2, no M/R/G  Abdomen: S/NT/ND, +BS  Extremities: No LE edema, no cyanosis  Neuro: AAOx3, non-focal  Skin: No rash    Labs:                        10.3   8.61  )-----------( 251      ( 21 May 2020 06:22 )             31.8     05-21    132<L>  |  100  |  15  ----------------------------<  100<H>  4.4   |  27  |  0.72    Ca    9.8      21 May 2020 06:22    TPro  8.2  /  Alb  3.7  /  TBili  0.4  /  DBili  x   /  AST  30  /  ALT  46  /  AlkPhos  125<H>  05-20        PT/INR - ( 20 May 2020 07:04 )   PT: 12.4 sec;   INR: 1.11 ratio         PTT - ( 20 May 2020 07:04 )  PTT:32.2 sec    ECG: Sinus bradycardia, LAD, no ST abnormality

## 2020-05-21 NOTE — PROGRESS NOTE ADULT - SUBJECTIVE AND OBJECTIVE BOX
Discharge medication calendar:  [No HO prophylaxis]  Eliquis 2.5mg q12h x 35 days  APAP 1000mg q8h x 2-3 weeks  Celecoxib 100mg q12h x 2-3 weeks  Omeprazole 20mg QAM x 6 weeks  Narcotic PRN  Docusate 100mg TID while taking narcotic  Miralax, Senna, or Bisacodyl PRN for treatment of constipation Discharge medication calendar:  [Preop RT]  Eliquis 2.5mg q12h x 35 days  APAP 1000mg q8h x 2-3 weeks  Celecoxib 100mg q12h x 2-3 weeks  Omeprazole 20mg QAM x 6 weeks  Narcotic PRN  Docusate 100mg TID while taking narcotic  Miralax, Senna, or Bisacodyl PRN for treatment of constipation

## 2020-05-22 ENCOUNTER — TRANSCRIPTION ENCOUNTER (OUTPATIENT)
Age: 65
End: 2020-05-22

## 2020-05-22 VITALS
HEART RATE: 66 BPM | SYSTOLIC BLOOD PRESSURE: 106 MMHG | RESPIRATION RATE: 18 BRPM | TEMPERATURE: 99 F | OXYGEN SATURATION: 95 % | DIASTOLIC BLOOD PRESSURE: 60 MMHG

## 2020-05-22 LAB
ANION GAP SERPL CALC-SCNC: 3 MMOL/L — LOW (ref 5–17)
BUN SERPL-MCNC: 14 MG/DL — SIGNIFICANT CHANGE UP (ref 7–23)
CALCIUM SERPL-MCNC: 9.9 MG/DL — SIGNIFICANT CHANGE UP (ref 8.4–10.5)
CHLORIDE SERPL-SCNC: 103 MMOL/L — SIGNIFICANT CHANGE UP (ref 96–108)
CO2 SERPL-SCNC: 29 MMOL/L — SIGNIFICANT CHANGE UP (ref 22–31)
CREAT SERPL-MCNC: 0.75 MG/DL — SIGNIFICANT CHANGE UP (ref 0.5–1.3)
GLUCOSE SERPL-MCNC: 103 MG/DL — HIGH (ref 70–99)
HCT VFR BLD CALC: 32.4 % — LOW (ref 34.5–45)
HGB BLD-MCNC: 10.3 G/DL — LOW (ref 11.5–15.5)
MCHC RBC-ENTMCNC: 29.5 PG — SIGNIFICANT CHANGE UP (ref 27–34)
MCHC RBC-ENTMCNC: 31.8 GM/DL — LOW (ref 32–36)
MCV RBC AUTO: 92.8 FL — SIGNIFICANT CHANGE UP (ref 80–100)
NRBC # BLD: 0 /100 WBCS — SIGNIFICANT CHANGE UP (ref 0–0)
PLATELET # BLD AUTO: 237 K/UL — SIGNIFICANT CHANGE UP (ref 150–400)
POTASSIUM SERPL-MCNC: 4.5 MMOL/L — SIGNIFICANT CHANGE UP (ref 3.5–5.3)
POTASSIUM SERPL-SCNC: 4.5 MMOL/L — SIGNIFICANT CHANGE UP (ref 3.5–5.3)
RBC # BLD: 3.49 M/UL — LOW (ref 3.8–5.2)
RBC # FLD: 12.6 % — SIGNIFICANT CHANGE UP (ref 10.3–14.5)
SODIUM SERPL-SCNC: 135 MMOL/L — SIGNIFICANT CHANGE UP (ref 135–145)
TSH SERPL-MCNC: 3.62 UIU/ML — SIGNIFICANT CHANGE UP (ref 0.27–4.2)
WBC # BLD: 7.94 K/UL — SIGNIFICANT CHANGE UP (ref 3.8–10.5)
WBC # FLD AUTO: 7.94 K/UL — SIGNIFICANT CHANGE UP (ref 3.8–10.5)

## 2020-05-22 PROCEDURE — 80053 COMPREHEN METABOLIC PANEL: CPT

## 2020-05-22 PROCEDURE — 97161 PT EVAL LOW COMPLEX 20 MIN: CPT

## 2020-05-22 PROCEDURE — 99232 SBSQ HOSP IP/OBS MODERATE 35: CPT

## 2020-05-22 PROCEDURE — 85018 HEMOGLOBIN: CPT

## 2020-05-22 PROCEDURE — 93005 ELECTROCARDIOGRAM TRACING: CPT

## 2020-05-22 PROCEDURE — 88305 TISSUE EXAM BY PATHOLOGIST: CPT

## 2020-05-22 PROCEDURE — 85014 HEMATOCRIT: CPT

## 2020-05-22 PROCEDURE — C1776: CPT

## 2020-05-22 PROCEDURE — C1713: CPT

## 2020-05-22 PROCEDURE — 36415 COLL VENOUS BLD VENIPUNCTURE: CPT

## 2020-05-22 PROCEDURE — 97530 THERAPEUTIC ACTIVITIES: CPT

## 2020-05-22 PROCEDURE — 88311 DECALCIFY TISSUE: CPT

## 2020-05-22 PROCEDURE — 94660 CPAP INITIATION&MGMT: CPT

## 2020-05-22 PROCEDURE — 86901 BLOOD TYPING SEROLOGIC RH(D): CPT

## 2020-05-22 PROCEDURE — 80048 BASIC METABOLIC PNL TOTAL CA: CPT

## 2020-05-22 PROCEDURE — 97110 THERAPEUTIC EXERCISES: CPT

## 2020-05-22 PROCEDURE — 84443 ASSAY THYROID STIM HORMONE: CPT

## 2020-05-22 PROCEDURE — 85610 PROTHROMBIN TIME: CPT

## 2020-05-22 PROCEDURE — 86850 RBC ANTIBODY SCREEN: CPT

## 2020-05-22 PROCEDURE — 85027 COMPLETE CBC AUTOMATED: CPT

## 2020-05-22 PROCEDURE — 86900 BLOOD TYPING SEROLOGIC ABO: CPT

## 2020-05-22 PROCEDURE — 97116 GAIT TRAINING THERAPY: CPT

## 2020-05-22 PROCEDURE — 97535 SELF CARE MNGMENT TRAINING: CPT

## 2020-05-22 PROCEDURE — C1889: CPT

## 2020-05-22 PROCEDURE — 76000 FLUOROSCOPY <1 HR PHYS/QHP: CPT

## 2020-05-22 PROCEDURE — 97165 OT EVAL LOW COMPLEX 30 MIN: CPT

## 2020-05-22 PROCEDURE — 85730 THROMBOPLASTIN TIME PARTIAL: CPT

## 2020-05-22 RX ORDER — METOPROLOL TARTRATE 50 MG
25 TABLET ORAL EVERY 12 HOURS
Refills: 0 | Status: DISCONTINUED | OUTPATIENT
Start: 2020-05-22 | End: 2020-05-22

## 2020-05-22 RX ORDER — METOPROLOL TARTRATE 50 MG
1 TABLET ORAL
Qty: 0 | Refills: 0 | DISCHARGE

## 2020-05-22 RX ORDER — METOPROLOL TARTRATE 50 MG
1 TABLET ORAL
Qty: 0 | Refills: 0 | DISCHARGE
Start: 2020-05-22 | End: 2020-06-20

## 2020-05-22 RX ORDER — METOPROLOL TARTRATE 50 MG
1 TABLET ORAL
Qty: 60 | Refills: 0
Start: 2020-05-22 | End: 2020-06-20

## 2020-05-22 RX ADMIN — CELECOXIB 100 MILLIGRAM(S): 200 CAPSULE ORAL at 08:09

## 2020-05-22 RX ADMIN — Medication 1000 MILLIGRAM(S): at 12:38

## 2020-05-22 RX ADMIN — APIXABAN 2.5 MILLIGRAM(S): 2.5 TABLET, FILM COATED ORAL at 08:10

## 2020-05-22 RX ADMIN — Medication 1000 MILLIGRAM(S): at 02:19

## 2020-05-22 RX ADMIN — LORATADINE 10 MILLIGRAM(S): 10 TABLET ORAL at 12:39

## 2020-05-22 RX ADMIN — Medication 50 MICROGRAM(S): at 05:27

## 2020-05-22 RX ADMIN — PANTOPRAZOLE SODIUM 40 MILLIGRAM(S): 20 TABLET, DELAYED RELEASE ORAL at 05:27

## 2020-05-22 RX ADMIN — POLYETHYLENE GLYCOL 3350 17 GRAM(S): 17 POWDER, FOR SOLUTION ORAL at 12:39

## 2020-05-22 RX ADMIN — OXYCODONE HYDROCHLORIDE 5 MILLIGRAM(S): 5 TABLET ORAL at 08:37

## 2020-05-22 RX ADMIN — AMLODIPINE BESYLATE 5 MILLIGRAM(S): 2.5 TABLET ORAL at 05:27

## 2020-05-22 RX ADMIN — OXYCODONE HYDROCHLORIDE 5 MILLIGRAM(S): 5 TABLET ORAL at 12:37

## 2020-05-22 RX ADMIN — LOSARTAN POTASSIUM 100 MILLIGRAM(S): 100 TABLET, FILM COATED ORAL at 05:27

## 2020-05-22 NOTE — PROGRESS NOTE ADULT - ASSESSMENT
The patient is a 64 year old female with a history of HTN, hypothyroidism, hyperparathyroidism, JOSE RAUL who is admitted s/p right THR.    Plan:  - Symptoms improved today  - Heart rates improved to mid 70s  - Restart metoprolol tartrate 25 mg bid starting tomorrow  - Continue amlodipine 5 mg daily and losartan 100 mg daily  - TSH normal  - On apixaban for DVT prophylaxis  - Discharge planning

## 2020-05-22 NOTE — PHARMACOTHERAPY INTERVENTION NOTE - COMMENTS
Presurgical evaluation for postoperative medication management. Team emailed. Note placed in [place].
Transition of Care education at bedside - medication calendar given to patient Pharmacy fill confirmed.
Admission medication reconciliation POD1

## 2020-05-22 NOTE — PROGRESS NOTE ADULT - ASSESSMENT
64F with HTN, hypothyroidism, hyperparathyroidism, chronic venous insufficiency, Glaucoma and JOSE RAUL admitted for aftercare following R. THR.     S/P Right THR  POD 2  Continue Bowel and pain control regimen.   Incentive Spirometer for lung expansion.  Work with PT to increase ambulation as per orthopedics.  Monitor Hgb and follow up electrolytes.   Orhtopedics on board and following     Bradycardia  Resolved and HR improved after Metoprolol held   Cardiology has reduced dose of Metoprolol on discharge   Most likely exacerbated by Opioids and recovery   EKG showing no changes and arrythmia     HTN  Controlled.   Norvasc but will hold Metoprolol  Resume ARB POD 2    Hypothyroidism  Synthroid Daily    Hyperparathyroidism  Being monitored by Endocrine outpatient     Glaucoma   Resume drops    JOSE RAUL  Sleep Study 3/20 but hasn't started CPAP   CPAP at bedtime     Diet  Regular    DVT Prophylaxis  Eliquis    Disposition  Full Code/Inpatient  Patient is medically cleared to go home once cleared by ORtho and PT

## 2020-05-22 NOTE — PROGRESS NOTE ADULT - SUBJECTIVE AND OBJECTIVE BOX
Subjective: Patient seen and examined.  Doing well with no overnight events.      MEDICATIONS  (STANDING):  acetaminophen   Tablet .. 1000 milliGRAM(s) Oral every 8 hours  amLODIPine   Tablet 5 milliGRAM(s) Oral daily  apixaban 2.5 milliGRAM(s) Oral every 12 hours  bisacodyl Suppository 10 milliGRAM(s) Rectal daily  celecoxib 100 milliGRAM(s) Oral every 12 hours  ketotifen 0.025% Ophthalmic Solution 1 Drop(s) Both EYES daily  latanoprost 0.005% Ophthalmic Solution 1 Drop(s) Both EYES at bedtime  levothyroxine 50 MICROGram(s) Oral <User Schedule>  levothyroxine 100 MICROGram(s) Oral <User Schedule>  loratadine 10 milliGRAM(s) Oral daily  losartan 100 milliGRAM(s) Oral daily  metoprolol tartrate 25 milliGRAM(s) Oral every 12 hours  pantoprazole    Tablet 40 milliGRAM(s) Oral before breakfast  polyethylene glycol 3350 17 Gram(s) Oral daily    MEDICATIONS  (PRN):  ALBUTerol    90 MICROgram(s) HFA Inhaler 2 Puff(s) Inhalation every 4 hours PRN Shortness of Breath and/or Wheezing  aluminum hydroxide/magnesium hydroxide/simethicone Suspension 30 milliLiter(s) Oral four times a day PRN Indigestion  HYDROmorphone  Injectable 0.5 milliGRAM(s) IV Push every 3 hours PRN Severe Pain (7 - 10)  magnesium hydroxide Suspension 30 milliLiter(s) Oral daily PRN Constipation  meclizine 25 milliGRAM(s) Oral every 6 hours PRN Dizziness  ondansetron Injectable 4 milliGRAM(s) IV Push every 6 hours PRN Nausea and/or Vomiting  oxyCODONE    IR 5 milliGRAM(s) Oral every 3 hours PRN Mild Pain (1 - 3)  oxyCODONE    IR 10 milliGRAM(s) Oral every 3 hours PRN Moderate Pain (4 - 6)  senna 2 Tablet(s) Oral at bedtime PRN Constipation      Allergies    adhesives (Rash)  Cardizem (Rash)  Fish Products (Rash)  Flonase (Swelling)  Levaquin (Rash)  lobster (Rash)    Intolerances        Vital Signs Last 24 Hrs  T(C): 36.7 (22 May 2020 08:07), Max: 37.2 (21 May 2020 15:30)  T(F): 98 (22 May 2020 08:07), Max: 98.9 (21 May 2020 15:30)  HR: 63 (22 May 2020 08:07) (57 - 68)  BP: 127/74 (22 May 2020 08:07) (101/59 - 152/74)  BP(mean): --  RR: 18 (22 May 2020 08:07) (17 - 18)  SpO2: 93% (22 May 2020 08:07) (93% - 100%)    PHYSICAL EXAM:  GENERAL: NAD, well-groomed, well-developed  HEAD:  Atraumatic, Normocephalic  ENMT: Moist mucous membranes,   NECK: Supple, No JVD, Normal thyroid  NERVOUS SYSTEM:  All 4 extremities mobile, no gross sensory deficits.   CHEST/LUNG: Clear to auscultation bilaterally; No rales, rhonchi, wheezing, or rubs  HEART: Regular rate and rhythm; No murmurs, rubs, or gallops  ABDOMEN: Soft, Nontender, Nondistended; Bowel sounds present  EXTREMITIES:  2+ Peripheral Pulses, No clubbing, cyanosis, or edema      LABS:                        10.3   7.94  )-----------( 237      ( 22 May 2020 05:58 )             32.4     22 May 2020 05:58    135    |  103    |  14     ----------------------------<  103    4.5     |  29     |  0.75     Ca    9.9        22 May 2020 05:58          CAPILLARY BLOOD GLUCOSE          RADIOLOGY & ADDITIONAL TESTS:    Imaging Personally Reviewed:  [ ] YES     Consultant(s) Notes Reviewed:      Care Discussed with Consultants/Other Providers:    Advanced Directives: [ ] DNR  [ ] No feeding tube  [ ] MOLST in chart  [ ] MOLST completed today  [ ] Unknown

## 2020-05-22 NOTE — DISCHARGE NOTE NURSING/CASE MANAGEMENT/SOCIAL WORK - PATIENT PORTAL LINK FT
You can access the FollowMyHealth Patient Portal offered by [facility] by registering at the following website: [email or web address]. By joining [employer]’s FollowMyHealth portal, you will also be able to view your health information using other applications (apps) compatible with our system.

## 2020-05-22 NOTE — PROGRESS NOTE ADULT - SUBJECTIVE AND OBJECTIVE BOX
Chief Complaint: Right THR    Interval Events: No events overnight. No complaints.    Review of Systems:  General: No fevers, chills, weight loss or gain  Skin: No rashes, color changes  Cardiovascular: No chest pain, orthopnea  Respiratory: No shortness of breath, cough  Gastrointestinal: No nausea, abdominal pain  Genitourinary: No incontinence, pain with urination  Musculoskeletal: No pain, swelling, decreased range of motion  Neurological: No headache, weakness  Psychiatric: No depression, anxiety  Endocrine: No weight loss or gain, increased thirst  All other systems are comprehensively negative.    Physical Exam:  Vitals:        Vital Signs Last 24 Hrs  T(C): 36.7 (22 May 2020 08:07), Max: 37.2 (21 May 2020 15:30)  T(F): 98 (22 May 2020 08:07), Max: 98.9 (21 May 2020 15:30)  HR: 63 (22 May 2020 08:07) (57 - 68)  BP: 127/74 (22 May 2020 08:07) (101/59 - 152/74)  BP(mean): --  RR: 18 (22 May 2020 08:07) (17 - 18)  SpO2: 93% (22 May 2020 08:07) (93% - 100%)  General: NAD  HEENT: MMM  Neck: No JVD, no carotid bruit  Lungs: CTAB  CV: RRR, nl S1/S2, no M/R/G  Abdomen: S/NT/ND, +BS  Extremities: No LE edema, no cyanosis  Neuro: AAOx3, non-focal  Skin: No rash    Labs:                        10.3   7.94  )-----------( 237      ( 22 May 2020 05:58 )             32.4     05-22    135  |  103  |  14  ----------------------------<  103<H>  4.5   |  29  |  0.75    Ca    9.9      22 May 2020 05:58              Telemetry: Sinus rhythm

## 2020-05-22 NOTE — DISCHARGE NOTE NURSING/CASE MANAGEMENT/SOCIAL WORK - NSSCNAMETXT_GEN_ALL_CORE
[facility]   Please contact the home care agency at  [phone] if you have not heard from them by 12 noon on the day after your hospital discharge.   Nurse to visit the day after hospital discharge; Rehabilitation Therapists to follow Normal for race

## 2020-05-22 NOTE — PROGRESS NOTE ADULT - SUBJECTIVE AND OBJECTIVE BOX
Procedure: Right  Anterior THR  POD#: 2    S: Pt without complaints. No SOB,CP, N/V. Tolerated Fluids / Diet well.   Pain comfortable on Interval Rx  + Tylenol + Celebrex. No BM yet  + flatus, No abdominal pain.  Pain Rx:   acetaminophen   Tablet .. 1000 milliGRAM(s) Oral every 8 hours  celecoxib 100 milliGRAM(s) Oral every 12 hours  HYDROmorphone  Injectable 0.5 milliGRAM(s) IV Push every 3 hours PRN  meclizine 25 milliGRAM(s) Oral every 6 hours PRN  ondansetron Injectable 4 milliGRAM(s) IV Push every 6 hours PRN  oxyCODONE    IR 5 milliGRAM(s) Oral every 3 hours PRN  oxyCODONE    IR 10 milliGRAM(s) Oral every 3 hours PRN    O: General: Pt Alert and oriented, On exam NAD,   VS: Vital Signs Last 24 Hrs  T(C): 37 (22 May 2020 03:30), Max: 37.2 (21 May 2020 15:30)  T(F): 98.6 (22 May 2020 03:30), Max: 98.9 (21 May 2020 15:30)  HR: 57 (22 May 2020 05:21) (57 - 68)  BP: 135/65 (22 May 2020 05:21) (101/59 - 152/74)  BP(mean): --  RR: 17 (22 May 2020 05:21) (17 - 18)  SpO2: 99% (22 May 2020 05:21) (94% - 100%)  Heart: RRR  Lungs: BS clear bilat.  Abdomen: Soft; no distention, benign exam    Ext: Right Ant. Hip: violet dressing clean, dry, & intact,   Neurologic: Has sensation bilat. feet & toes ;  Full AROM bilat feet & toes. EHL / AT  = Bilat: 5/5 ; Sensation Present mid lateral thigh  Vascular: Feet toes warm, pink. DP = 2+. Calves soft ; w/o tenderness bilat..  VTEP: On Bilat. Venodynes +   apixaban 2.5 milliGRAM(s) Oral every 12 hours    H.O Prophylaxis: preop radiation   Activity in PT Noted.  Walked 100'; needs to do stairs today                          10.3   7.94  )-----------( 237      ( 22 May 2020 05:58 )             32.4     05-22    135  |  103  |  14  ----------------------------<  103<H>  4.5   |  29  |  0.75    Ca    9.9      22 May 2020 05:58        Hospitalist input noted    Primary Orthopedic Assessment:  • Stable from Orthopedic perspective  • Neuro motor exam stable:   • Labs: post op anemia well tolerated      Plan:   • Continue:  PT/OT/Weightbearing as tolerated with assistance of a walker/Anterior THR precautions/Ice to hip/          Incentive spirometry encouraged / Celebrex for HO PPX  • Continue DVT prophylaxis as prescribed, including use of compression devices and ankle pumps  • Continue Pain Rx  • Anterior hip precautions reviewed with patient  • Plans per Medicine   • Discharge planning – anticipated discharge is Home D/C with home care & home PT  when medically stable & cleared by PT/OT--today

## 2020-06-04 ENCOUNTER — APPOINTMENT (OUTPATIENT)
Dept: ORTHOPEDIC SURGERY | Facility: CLINIC | Age: 65
End: 2020-06-04

## 2020-06-04 ENCOUNTER — APPOINTMENT (OUTPATIENT)
Dept: ORTHOPEDIC SURGERY | Facility: CLINIC | Age: 65
End: 2020-06-04
Payer: COMMERCIAL

## 2020-06-04 VITALS
SYSTOLIC BLOOD PRESSURE: 152 MMHG | BODY MASS INDEX: 53.73 KG/M2 | DIASTOLIC BLOOD PRESSURE: 79 MMHG | WEIGHT: 292 LBS | TEMPERATURE: 98.3 F | HEART RATE: 87 BPM | HEIGHT: 62 IN

## 2020-06-04 PROCEDURE — 73502 X-RAY EXAM HIP UNI 2-3 VIEWS: CPT | Mod: RT

## 2020-06-04 PROCEDURE — 99024 POSTOP FOLLOW-UP VISIT: CPT

## 2020-06-08 ENCOUNTER — APPOINTMENT (OUTPATIENT)
Dept: ORTHOPEDIC SURGERY | Facility: CLINIC | Age: 65
End: 2020-06-08
Payer: COMMERCIAL

## 2020-06-08 VITALS — TEMPERATURE: 97.7 F

## 2020-06-08 PROCEDURE — 99024 POSTOP FOLLOW-UP VISIT: CPT

## 2020-06-12 ENCOUNTER — APPOINTMENT (OUTPATIENT)
Dept: ORTHOPEDIC SURGERY | Facility: CLINIC | Age: 65
End: 2020-06-12
Payer: COMMERCIAL

## 2020-06-12 VITALS
WEIGHT: 292 LBS | BODY MASS INDEX: 53.73 KG/M2 | HEART RATE: 73 BPM | DIASTOLIC BLOOD PRESSURE: 86 MMHG | HEIGHT: 62 IN | SYSTOLIC BLOOD PRESSURE: 160 MMHG | TEMPERATURE: 98 F

## 2020-06-12 PROCEDURE — 99024 POSTOP FOLLOW-UP VISIT: CPT

## 2020-06-12 NOTE — HISTORY OF PRESENT ILLNESS
[2] : the patient reports pain that is 2/10 in severity [___ Days Post Op] : post op day #[unfilled] [Chills] : no chills [Fever] : no fever [Vomiting] : no vomiting [Nausea] : no nausea [Swelling] : swollen [Erythema] : erythematous [Neuro Intact] : an unremarkable neurological exam [Vascular Intact] : ~T peripheral vascular exam normal [Excellent Pain Control] : has excellent pain control [No Sign of Infection] : is showing no signs of infection [Doing Well] : is doing well [de-identified] : The patient presents today for follow up evaluation of right hip surgical wound.She  verbalized feeling well overall. States her son did the dressing change as instructed without any problems. She reports improved pain. Denies redness, swelling and drainage from the wound. She is taking Tylenol for pain relief and Celebrex for anti inflammatory. The patient denies fever, chills or body aches. Continues with Minocycline as prescribed.  [de-identified] : Right hip wound check. S/P right anterior THR performed on 5/21/20. [de-identified] : No Radiographs obtained at this visit [de-identified] : Superficial disruption of surgical incision. [de-identified] : The patient has moderate antalgic gait utilizing a walker. The right anterior hip surgical wound with improved erythema and swelling. No drainage noted. No palpable tenderness, heat or effusion. The wound now measures  approximate 1 CM x 0.3 CM x 0.0 CM.  [de-identified] : The surgical site was wiped with Chlorhexidine prep, applied Aquacel and covered with dry sterile gauze. She was advised to change the dressing in 2-3 days. She will continue on  Minocycline as prescribed. She will follow up with us on Tuesday, 6/16/20 for follow up wound check. The patient verbalized understanding of all instructions. All of her questions were addressed to her satisfaction. She was advised to call the office at anytime with further questions or concerns.

## 2020-06-16 ENCOUNTER — APPOINTMENT (OUTPATIENT)
Dept: ORTHOPEDIC SURGERY | Facility: CLINIC | Age: 65
End: 2020-06-16
Payer: COMMERCIAL

## 2020-06-16 PROCEDURE — 99024 POSTOP FOLLOW-UP VISIT: CPT

## 2020-06-17 ENCOUNTER — APPOINTMENT (OUTPATIENT)
Dept: ORTHOPEDIC SURGERY | Facility: HOSPITAL | Age: 65
End: 2020-06-17

## 2020-06-22 ENCOUNTER — APPOINTMENT (OUTPATIENT)
Dept: ORTHOPEDIC SURGERY | Facility: CLINIC | Age: 65
End: 2020-06-22
Payer: COMMERCIAL

## 2020-06-22 VITALS — SYSTOLIC BLOOD PRESSURE: 139 MMHG | TEMPERATURE: 98.2 F | DIASTOLIC BLOOD PRESSURE: 83 MMHG | HEART RATE: 69 BPM

## 2020-06-22 DIAGNOSIS — T81.31XA DISRUPTION OF EXTERNAL OPERATION (SURGICAL) WOUND, NOT ELSEWHERE CLASSIFIED, INITIAL ENCOUNTER: ICD-10-CM

## 2020-06-22 PROCEDURE — 99024 POSTOP FOLLOW-UP VISIT: CPT

## 2020-07-21 ENCOUNTER — APPOINTMENT (OUTPATIENT)
Dept: ORTHOPEDIC SURGERY | Facility: CLINIC | Age: 65
End: 2020-07-21
Payer: COMMERCIAL

## 2020-07-21 VITALS — BODY MASS INDEX: 53.73 KG/M2 | TEMPERATURE: 97.8 F | HEIGHT: 62 IN | WEIGHT: 292 LBS

## 2020-07-21 DIAGNOSIS — R29.898 OTHER SYMPTOMS AND SIGNS INVOLVING THE MUSCULOSKELETAL SYSTEM: ICD-10-CM

## 2020-07-21 PROCEDURE — 99024 POSTOP FOLLOW-UP VISIT: CPT

## 2020-07-21 PROCEDURE — 73502 X-RAY EXAM HIP UNI 2-3 VIEWS: CPT | Mod: RT

## 2020-08-18 ENCOUNTER — APPOINTMENT (OUTPATIENT)
Dept: ORTHOPEDIC SURGERY | Facility: CLINIC | Age: 65
End: 2020-08-18
Payer: COMMERCIAL

## 2020-08-18 VITALS
HEART RATE: 88 BPM | SYSTOLIC BLOOD PRESSURE: 171 MMHG | BODY MASS INDEX: 53.73 KG/M2 | DIASTOLIC BLOOD PRESSURE: 84 MMHG | WEIGHT: 292 LBS | HEIGHT: 62 IN | TEMPERATURE: 97.1 F

## 2020-08-18 PROCEDURE — 99024 POSTOP FOLLOW-UP VISIT: CPT

## 2020-09-16 ENCOUNTER — FORM ENCOUNTER (OUTPATIENT)
Age: 65
End: 2020-09-16

## 2020-11-05 NOTE — H&P PST ADULT - HEIGHT IN CM
Final Anesthesia Post-op Assessment    Patient: Noramainor Miranda  Procedure(s) Performed: LEFT ANKLE SOFT TISSUE MASS REMOVAL - LEFT  Anesthesia type: General    Vitals Value Taken Time   Temp 36.3 °C (97.3 °F) 11/05/20 1112   Pulse 101 11/05/20 1112   Resp 18 11/05/20 1112   SpO2 97 % 11/05/20 1112   /73 11/05/20 1112         Patient Location: PACU Phase 1  Post-op Vital Signs:stable  Level of Consciousness: awake and alert  Respiratory Status: spontaneous ventilation  Cardiovascular stable  Hydration: euvolemic  Pain Management: adequately controlled  Handoff: Handoff to receiving nurse was performed and questions were answered  Vomiting: none   Nausea: None  Airway Patency:patent  Post-op Assessment: no complications and patient tolerated procedure well with no complications      No complications documented.    157.48

## 2020-12-01 ENCOUNTER — APPOINTMENT (OUTPATIENT)
Dept: OTOLARYNGOLOGY | Facility: CLINIC | Age: 65
End: 2020-12-01
Payer: COMMERCIAL

## 2020-12-01 VITALS
WEIGHT: 275 LBS | DIASTOLIC BLOOD PRESSURE: 92 MMHG | SYSTOLIC BLOOD PRESSURE: 174 MMHG | HEIGHT: 62 IN | BODY MASS INDEX: 50.61 KG/M2 | TEMPERATURE: 97.1 F

## 2020-12-01 DIAGNOSIS — Z88.9 ALLERGY STATUS TO UNSPECIFIED DRUGS, MEDICAMENTS AND BIOLOGICAL SUBSTANCES: ICD-10-CM

## 2020-12-01 DIAGNOSIS — Z80.9 FAMILY HISTORY OF MALIGNANT NEOPLASM, UNSPECIFIED: ICD-10-CM

## 2020-12-01 DIAGNOSIS — Z84.89 FAMILY HISTORY OF OTHER SPECIFIED CONDITIONS: ICD-10-CM

## 2020-12-01 DIAGNOSIS — Z82.2 FAMILY HISTORY OF DEAFNESS AND HEARING LOSS: ICD-10-CM

## 2020-12-01 PROCEDURE — 99244 OFF/OP CNSLTJ NEW/EST MOD 40: CPT

## 2020-12-01 PROCEDURE — 99072 ADDL SUPL MATRL&STAF TM PHE: CPT

## 2020-12-01 RX ORDER — MINOCYCLINE HYDROCHLORIDE 100 MG/1
100 CAPSULE ORAL
Qty: 14 | Refills: 0 | Status: COMPLETED | COMMUNITY
Start: 2020-06-08 | End: 2020-12-01

## 2020-12-01 RX ORDER — ASPIRIN 81 MG/1
81 TABLET ORAL
Refills: 0 | Status: DISCONTINUED | COMMUNITY
End: 2020-12-01

## 2020-12-01 RX ORDER — METOPROLOL TARTRATE 75 MG/1
TABLET, FILM COATED ORAL
Refills: 0 | Status: DISCONTINUED | COMMUNITY
End: 2020-12-01

## 2020-12-01 RX ORDER — CELECOXIB 200 MG/1
200 CAPSULE ORAL
Refills: 0 | Status: DISCONTINUED | COMMUNITY
End: 2020-12-01

## 2020-12-01 RX ORDER — AMOXICILLIN 500 MG/1
500 CAPSULE ORAL
Qty: 20 | Refills: 2 | Status: COMPLETED | COMMUNITY
Start: 2020-02-21 | End: 2020-12-01

## 2020-12-01 RX ORDER — MINOCYCLINE HYDROCHLORIDE 100 MG/1
100 TABLET ORAL TWICE DAILY
Qty: 6 | Refills: 0 | Status: COMPLETED | COMMUNITY
Start: 2020-06-15 | End: 2020-12-01

## 2020-12-01 NOTE — CONSULT LETTER
[Dear  ___] : Dear  [unfilled], [Courtesy Letter:] : I had the pleasure of seeing your patient, [unfilled], in my office today. [Please see my note below.] : Please see my note below. [Consult Closing:] : Thank you very much for allowing me to participate in the care of this patient.  If you have any questions, please do not hesitate to contact me. [Sincerely,] : Sincerely, [FreeTextEntry3] : Armando Trevizo MD FACS

## 2020-12-01 NOTE — ASSESSMENT
[FreeTextEntry1] : Sialoadenitis \par Swelling on the tail of the right parotid, whole tail is inflamed.\par h/o  hyperparathyroidism and sleep apnea - uses CPAP\par \par Plan: increased water intake with linwood and sours. US parotid. Cephalexin BID. FU after test.

## 2020-12-01 NOTE — PHYSICAL EXAM
[Hearing Lazar Test (Tuning Fork On Forehead)] : no lateralization of tone [Normal] : mucosa is normal [Midline] : trachea located in midline position [Hearing Loss Right Only] : normal [Hearing Loss Left Only] : normal [FreeTextEntry1] : mildly deviated septum , and mildly inflamed turbinates.  [FreeTextEntry2] : sinuses nontender to percussion

## 2020-12-01 NOTE — ADDENDUM
[FreeTextEntry1] : Documented by Isabel Ann acting as scribe for Dr. Trevizo on 12/01/2020.\par \par All Medical record entries made by the Scribe were at my, Dr. Trevizo, direction and personally dictated by me on 12/01/2020 . I have reviewed the chart and agree that the record accurately reflects my personal performance of the history, physical exam, assessment and plan. I have also personally directed, reviewed, and agreed with the discharge instructions.

## 2020-12-01 NOTE — REVIEW OF SYSTEMS
[Sneezing] : sneezing [Seasonal Allergies] : seasonal allergies [Ear Pain] : ear pain [Ear Itch] : ear itch [Hearing Loss] : hearing loss [Dizziness] : dizziness [Vertigo] : vertigo [Ear Noises] : ear noises [Nasal Congestion] : nasal congestion [Recurrent Sinus Infections] : recurrent sinus infections [Problem Snoring] : problem snoring [Discolored Nasal Discharge] : discolored nasal discharge [Snoring With Pauses] : snoring with pauses [Eyes Itch] : itching of the eyes [Palpitations] : palpitations [Heartburn] : heartburn [Itching] : itching [Negative] : Heme/Lymph [de-identified] : feels cooler than others

## 2020-12-01 NOTE — HISTORY OF PRESENT ILLNESS
[de-identified] : The patient presents with h/o snoring and allergic rhinitis. She presents today with a salivary gland infection. She had a cyst on the right salivary gland in 1988. It was on the submandibular gland and hasn't had a problem with it since. The left side parotid gland started bothering her on the 11/15. It was tender and resolved by the next morning. On 11/23 the left parotid was tender and there was swelling over the next couple of days. She went to urgent care where they prescribed her Keflex. The swelling decreased. She increased her water intake. She hasn't been eating sours. Denies DM.  She was diagnosed with hyperparathyroidism and sleep apnea and is using CPAP.

## 2020-12-11 ENCOUNTER — APPOINTMENT (OUTPATIENT)
Dept: OTOLARYNGOLOGY | Facility: CLINIC | Age: 65
End: 2020-12-11
Payer: COMMERCIAL

## 2020-12-11 VITALS
TEMPERATURE: 98.1 F | HEIGHT: 62 IN | DIASTOLIC BLOOD PRESSURE: 95 MMHG | WEIGHT: 275 LBS | HEART RATE: 62 BPM | BODY MASS INDEX: 50.61 KG/M2 | SYSTOLIC BLOOD PRESSURE: 181 MMHG

## 2020-12-11 DIAGNOSIS — K12.1 OTHER FORMS OF STOMATITIS: ICD-10-CM

## 2020-12-11 DIAGNOSIS — K11.20 SIALOADENITIS, UNSPECIFIED: ICD-10-CM

## 2020-12-11 DIAGNOSIS — K12.30 OTHER FORMS OF STOMATITIS: ICD-10-CM

## 2020-12-11 PROCEDURE — 99214 OFFICE O/P EST MOD 30 MIN: CPT

## 2020-12-11 PROCEDURE — 99072 ADDL SUPL MATRL&STAF TM PHE: CPT

## 2020-12-11 RX ORDER — CEPHALEXIN 500 MG/1
500 CAPSULE ORAL
Qty: 10 | Refills: 0 | Status: COMPLETED | COMMUNITY
Start: 2020-12-01 | End: 2020-12-11

## 2020-12-11 RX ORDER — MULTIVIT-MIN/FA/LYCOPEN/LUTEIN .4-300-25
TABLET ORAL
Refills: 0 | Status: DISCONTINUED | COMMUNITY
End: 2020-12-11

## 2020-12-11 NOTE — HISTORY OF PRESENT ILLNESS
[de-identified] : The patient presents with h/o hyperparathyroidism and sleep apnea - uses CPAP, sialoadenitis. Pt presents today for results. She states that it has felt "puffy" a couple of times but overall is much better. She had sour lemon candies and lemon water but the gland didn't swell. She finished the abx. \par \par

## 2020-12-11 NOTE — ADDENDUM
[FreeTextEntry1] : Documented by Isabel Ann acting as scribe for Dr. Trevizo on 12/11/2020.\par \par All Medical record entries made by the Scribe were at my, Dr. Trevizo, direction and personally dictated by me on 12/11/2020 . I have reviewed the chart and agree that the record accurately reflects my personal performance of the history, physical exam, assessment and plan. I have also personally directed, reviewed, and agreed with the discharge instructions.

## 2021-02-06 NOTE — DISCHARGE NOTE NURSING/CASE MANAGEMENT/SOCIAL WORK - NSDCPEELIQUIS_GEN_ALL_CORE
3
Apixaban/Eliquis - Potential for adverse drug reactions and interactions/Apixaban/Eliquis - Compliance/Apixaban/Eliquis - Dietary Advice/Apixaban/Eliquis - Follow up monitoring

## 2021-02-16 ENCOUNTER — APPOINTMENT (OUTPATIENT)
Dept: ORTHOPEDIC SURGERY | Facility: CLINIC | Age: 66
End: 2021-02-16
Payer: COMMERCIAL

## 2021-02-16 DIAGNOSIS — Z87.39 PERSONAL HISTORY OF OTHER DISEASES OF THE MUSCULOSKELETAL SYSTEM AND CONNECTIVE TISSUE: ICD-10-CM

## 2021-02-16 PROCEDURE — 73502 X-RAY EXAM HIP UNI 2-3 VIEWS: CPT | Mod: LT

## 2021-02-16 PROCEDURE — 99072 ADDL SUPL MATRL&STAF TM PHE: CPT

## 2021-02-16 PROCEDURE — 99212 OFFICE O/P EST SF 10 MIN: CPT

## 2021-02-16 RX ORDER — FUROSEMIDE 20 MG/1
20 TABLET ORAL
Qty: 30 | Refills: 0 | Status: ACTIVE | COMMUNITY
Start: 2020-12-28

## 2021-02-16 NOTE — DISCHARGE NOTE PROVIDER - NSDCCONDITION_GEN_ALL_CORE
Dr Perez Stable

## 2021-04-18 ENCOUNTER — TRANSCRIPTION ENCOUNTER (OUTPATIENT)
Age: 66
End: 2021-04-18

## 2021-04-19 ENCOUNTER — APPOINTMENT (OUTPATIENT)
Dept: OTOLARYNGOLOGY | Facility: CLINIC | Age: 66
End: 2021-04-19
Payer: COMMERCIAL

## 2021-04-19 VITALS
TEMPERATURE: 97.5 F | DIASTOLIC BLOOD PRESSURE: 84 MMHG | HEIGHT: 62 IN | BODY MASS INDEX: 51.53 KG/M2 | HEART RATE: 70 BPM | WEIGHT: 280 LBS | SYSTOLIC BLOOD PRESSURE: 160 MMHG

## 2021-04-19 DIAGNOSIS — Z99.89 OBSTRUCTIVE SLEEP APNEA (ADULT) (PEDIATRIC): ICD-10-CM

## 2021-04-19 DIAGNOSIS — J31.0 CHRONIC RHINITIS: ICD-10-CM

## 2021-04-19 DIAGNOSIS — K11.20 SIALOADENITIS, UNSPECIFIED: ICD-10-CM

## 2021-04-19 DIAGNOSIS — R22.0 LOCALIZED SWELLING, MASS AND LUMP, HEAD: ICD-10-CM

## 2021-04-19 DIAGNOSIS — J34.2 DEVIATED NASAL SEPTUM: ICD-10-CM

## 2021-04-19 DIAGNOSIS — R22.1 LOCALIZED SWELLING, MASS AND LUMP, HEAD: ICD-10-CM

## 2021-04-19 DIAGNOSIS — G47.33 OBSTRUCTIVE SLEEP APNEA (ADULT) (PEDIATRIC): ICD-10-CM

## 2021-04-19 PROCEDURE — 99072 ADDL SUPL MATRL&STAF TM PHE: CPT

## 2021-04-19 PROCEDURE — 99214 OFFICE O/P EST MOD 30 MIN: CPT

## 2021-04-19 RX ORDER — AMOXICILLIN 500 MG/1
500 CAPSULE ORAL
Qty: 20 | Refills: 2 | Status: COMPLETED | COMMUNITY
Start: 2021-02-16 | End: 2021-04-19

## 2021-04-19 NOTE — ASSESSMENT
[FreeTextEntry1] : Reviewed and reconciled medications, allergies, PMHx, PSHx, SocHx, FMHx.\par \par h/o hyperparathyroidism, sleep apnea - uses CPAP, sialoadenitis\par left parotitis with complete obstruction\par \par Plan:\par Increase water intake with linwood and sours. Continue Keflex. Pressure on the gland. CT soft tissue neck. FU after test.

## 2021-04-19 NOTE — PHYSICAL EXAM
[] : septum deviated to the right [de-identified] : mildly inflamed [FreeTextEntry1] : no drainage from the parotid gland

## 2021-04-19 NOTE — HISTORY OF PRESENT ILLNESS
[de-identified] : The patient presents with h/o hyperparathyroidism, sleep apnea - uses CPAP, sialoadenitis. Pt reports that she was doing well until Saturday night 4/17/21 were she got a "twinge" in the parotid gland. She woke up Sunday 4/18/21 with swelling. She went to urgent care and was treated with Keflex 500 mg TID. She had increased swelling and pain last night. She has been using linwood and sours. Pt states that her PTH was stable and Calcium levels weren't bad so they've just been watching it.

## 2021-05-20 NOTE — OCCUPATIONAL THERAPY INITIAL EVALUATION ADULT - PATIENT/FAMILY/SIGNIFICANT OTHER GOALS STATEMENT, OT EVAL
Thank you for allowing us to care for you at [facility] today. Thank you for your patience.     You have been seen and evaluated for left-sided abdominal wall pain.  You can take over-the-counter ibuprofen or Tylenol for pain as needed.  We reviewed the results from your visit in the emergency department. Please read the instructions provided, and if given prescriptions, take as instructed.     Remember, you care process does not end after your visit today. Please follow-up with your doctor within 1-2 days for a follow-up check to ensure you are  improving, to see if you need any further evaluation/testing, or to evaluate for any alternate diagnoses.     If you do not have a primary care provider, call [phone] and they will be able to assist you further.     Please return to the emergency department if you develop severe abdominal wall pain, bowel or urine problems, blood in urine or if you develop any other new or concerning symptoms as these could be signs of more serious medical illness.    We hope you feel better.   
to go home

## 2021-06-15 ENCOUNTER — APPOINTMENT (OUTPATIENT)
Dept: ORTHOPEDIC SURGERY | Facility: CLINIC | Age: 66
End: 2021-06-15
Payer: COMMERCIAL

## 2021-06-15 VITALS — BODY MASS INDEX: 51.53 KG/M2 | WEIGHT: 280 LBS | TEMPERATURE: 97.3 F | HEIGHT: 62 IN

## 2021-06-15 DIAGNOSIS — Z98.890 OTHER SPECIFIED POSTPROCEDURAL STATES: ICD-10-CM

## 2021-06-15 PROCEDURE — 73610 X-RAY EXAM OF ANKLE: CPT | Mod: LT

## 2021-06-15 PROCEDURE — 99072 ADDL SUPL MATRL&STAF TM PHE: CPT

## 2021-06-15 PROCEDURE — 99213 OFFICE O/P EST LOW 20 MIN: CPT

## 2021-06-15 PROCEDURE — 73502 X-RAY EXAM HIP UNI 2-3 VIEWS: CPT | Mod: RT

## 2021-07-10 ENCOUNTER — TRANSCRIPTION ENCOUNTER (OUTPATIENT)
Age: 66
End: 2021-07-10

## 2021-08-26 ENCOUNTER — APPOINTMENT (OUTPATIENT)
Dept: SURGERY | Facility: CLINIC | Age: 66
End: 2021-08-26
Payer: COMMERCIAL

## 2021-08-26 VITALS
OXYGEN SATURATION: 94 % | WEIGHT: 285 LBS | HEART RATE: 75 BPM | BODY MASS INDEX: 52.44 KG/M2 | HEIGHT: 62 IN | SYSTOLIC BLOOD PRESSURE: 140 MMHG | DIASTOLIC BLOOD PRESSURE: 90 MMHG

## 2021-08-26 DIAGNOSIS — E21.3 HYPERPARATHYROIDISM, UNSPECIFIED: ICD-10-CM

## 2021-08-26 PROCEDURE — 99204 OFFICE O/P NEW MOD 45 MIN: CPT

## 2021-08-26 NOTE — REVIEW OF SYSTEMS
[Hoarseness] : hoarseness [As Noted in HPI] : as noted in HPI [Negative] : Heme/Lymph [FreeTextEntry4] : intermittent right neck discomfort

## 2021-08-26 NOTE — PHYSICAL EXAM
[Midline] : located in midline position [Normal] : orientation to person, place, and time: normal [FreeTextEntry1] : obese [de-identified] : right-sided scar as above [de-identified] : Extremities: STAHL x 4, however ambulates with a cane.   Skin: No obvious skin lesions.   Voice: clear

## 2021-08-26 NOTE — REASON FOR VISIT
[Initial Consultation] : an initial consultation for [FreeTextEntry2] : hyperparathyroidism in the setting of bilateral subcentimeter thyroid nodules and a presumed right inferior parathyroid adenoma

## 2021-08-28 ENCOUNTER — NON-APPOINTMENT (OUTPATIENT)
Age: 66
End: 2021-08-28

## 2021-08-29 LAB
CAU: 19 MG/DL
CREAT 24H UR-MCNC: 1.3 G/24 H
CREAT ?TM UR-MCNC: 60 MG/DL
PROT ?TM UR-MCNC: 24 HR
SPECIMEN VOL 24H UR: 2125 ML
SPECIMEN VOL 24H UR: 404 MG/24 H

## 2021-09-06 ENCOUNTER — APPOINTMENT (OUTPATIENT)
Dept: RADIOLOGY | Facility: CLINIC | Age: 66
End: 2021-09-06
Payer: COMMERCIAL

## 2021-09-06 ENCOUNTER — OUTPATIENT (OUTPATIENT)
Dept: OUTPATIENT SERVICES | Facility: HOSPITAL | Age: 66
LOS: 1 days | End: 2021-09-06
Payer: COMMERCIAL

## 2021-09-06 ENCOUNTER — APPOINTMENT (OUTPATIENT)
Dept: CT IMAGING | Facility: CLINIC | Age: 66
End: 2021-09-06
Payer: COMMERCIAL

## 2021-09-06 ENCOUNTER — APPOINTMENT (OUTPATIENT)
Dept: ULTRASOUND IMAGING | Facility: CLINIC | Age: 66
End: 2021-09-06
Payer: COMMERCIAL

## 2021-09-06 DIAGNOSIS — D24.2 BENIGN NEOPLASM OF LEFT BREAST: Chronic | ICD-10-CM

## 2021-09-06 DIAGNOSIS — Z98.890 OTHER SPECIFIED POSTPROCEDURAL STATES: Chronic | ICD-10-CM

## 2021-09-06 DIAGNOSIS — S86.119A STRAIN OF OTHER MUSCLE(S) AND TENDON(S) OF POSTERIOR MUSCLE GROUP AT LOWER LEG LEVEL, UNSPECIFIED LEG, INITIAL ENCOUNTER: Chronic | ICD-10-CM

## 2021-09-06 DIAGNOSIS — Z90.710 ACQUIRED ABSENCE OF BOTH CERVIX AND UTERUS: Chronic | ICD-10-CM

## 2021-09-06 DIAGNOSIS — E04.2 NONTOXIC MULTINODULAR GOITER: ICD-10-CM

## 2021-09-06 DIAGNOSIS — Z96.642 PRESENCE OF LEFT ARTIFICIAL HIP JOINT: Chronic | ICD-10-CM

## 2021-09-06 DIAGNOSIS — E21.0 PRIMARY HYPERPARATHYROIDISM: ICD-10-CM

## 2021-09-06 DIAGNOSIS — K11.6 MUCOCELE OF SALIVARY GLAND: Chronic | ICD-10-CM

## 2021-09-06 PROCEDURE — 76536 US EXAM OF HEAD AND NECK: CPT

## 2021-09-06 PROCEDURE — 76536 US EXAM OF HEAD AND NECK: CPT | Mod: 26

## 2021-09-06 PROCEDURE — 77080 DXA BONE DENSITY AXIAL: CPT

## 2021-09-06 PROCEDURE — 70491 CT SOFT TISSUE NECK W/DYE: CPT | Mod: 26

## 2021-09-06 PROCEDURE — 77080 DXA BONE DENSITY AXIAL: CPT | Mod: 26

## 2021-09-06 PROCEDURE — 82565 ASSAY OF CREATININE: CPT

## 2021-09-06 PROCEDURE — 70492 CT SFT TSUE NCK W/O & W/DYE: CPT

## 2021-09-08 ENCOUNTER — NON-APPOINTMENT (OUTPATIENT)
Age: 66
End: 2021-09-08

## 2021-09-08 ENCOUNTER — TRANSCRIPTION ENCOUNTER (OUTPATIENT)
Age: 66
End: 2021-09-08

## 2021-09-10 DIAGNOSIS — E04.2 NONTOXIC MULTINODULAR GOITER: ICD-10-CM

## 2021-09-14 ENCOUNTER — NON-APPOINTMENT (OUTPATIENT)
Age: 66
End: 2021-09-14

## 2021-09-14 ENCOUNTER — APPOINTMENT (OUTPATIENT)
Dept: ORTHOPEDIC SURGERY | Facility: CLINIC | Age: 66
End: 2021-09-14
Payer: COMMERCIAL

## 2021-09-14 VITALS — HEART RATE: 52 BPM | DIASTOLIC BLOOD PRESSURE: 87 MMHG | SYSTOLIC BLOOD PRESSURE: 150 MMHG

## 2021-09-14 PROCEDURE — 99214 OFFICE O/P EST MOD 30 MIN: CPT

## 2021-09-14 RX ORDER — CEPHALEXIN 500 MG/1
500 CAPSULE ORAL 3 TIMES DAILY
Refills: 0 | Status: DISCONTINUED | COMMUNITY
End: 2021-09-14

## 2021-09-20 ENCOUNTER — APPOINTMENT (OUTPATIENT)
Dept: SURGERY | Facility: CLINIC | Age: 66
End: 2021-09-20
Payer: COMMERCIAL

## 2021-09-20 DIAGNOSIS — Z87.39 PERSONAL HISTORY OF OTHER DISEASES OF THE MUSCULOSKELETAL SYSTEM AND CONNECTIVE TISSUE: ICD-10-CM

## 2021-09-20 DIAGNOSIS — Z86.39 PERSONAL HISTORY OF OTHER ENDOCRINE, NUTRITIONAL AND METABOLIC DISEASE: ICD-10-CM

## 2021-09-20 PROCEDURE — 99214 OFFICE O/P EST MOD 30 MIN: CPT

## 2021-09-20 NOTE — REVIEW OF SYSTEMS
[Hoarseness] : hoarseness [Negative] : Heme/Lymph [FreeTextEntry4] : intermittent right neck discomfort

## 2021-09-20 NOTE — PHYSICAL EXAM
[Midline] : located in midline position [Normal] : orientation to person, place, and time: normal [FreeTextEntry1] : obese [de-identified] : right-sided scar as above [de-identified] : Extremities: STAHL x 4, however ambulates with a cane.   Skin: No obvious skin lesions.   Voice: slightly raspy

## 2021-11-18 ENCOUNTER — OUTPATIENT (OUTPATIENT)
Dept: OUTPATIENT SERVICES | Facility: HOSPITAL | Age: 66
LOS: 1 days | End: 2021-11-18
Payer: COMMERCIAL

## 2021-11-18 VITALS
HEIGHT: 62.5 IN | DIASTOLIC BLOOD PRESSURE: 82 MMHG | TEMPERATURE: 98 F | OXYGEN SATURATION: 98 % | RESPIRATION RATE: 16 BRPM | WEIGHT: 285.94 LBS | HEART RATE: 64 BPM | SYSTOLIC BLOOD PRESSURE: 172 MMHG

## 2021-11-18 DIAGNOSIS — Z01.818 ENCOUNTER FOR OTHER PREPROCEDURAL EXAMINATION: ICD-10-CM

## 2021-11-18 DIAGNOSIS — Z90.710 ACQUIRED ABSENCE OF BOTH CERVIX AND UTERUS: Chronic | ICD-10-CM

## 2021-11-18 DIAGNOSIS — Z98.890 OTHER SPECIFIED POSTPROCEDURAL STATES: Chronic | ICD-10-CM

## 2021-11-18 DIAGNOSIS — E21.0 PRIMARY HYPERPARATHYROIDISM: ICD-10-CM

## 2021-11-18 DIAGNOSIS — D24.2 BENIGN NEOPLASM OF LEFT BREAST: Chronic | ICD-10-CM

## 2021-11-18 DIAGNOSIS — E05.90 THYROTOXICOSIS, UNSPECIFIED WITHOUT THYROTOXIC CRISIS OR STORM: ICD-10-CM

## 2021-11-18 DIAGNOSIS — Z96.642 PRESENCE OF LEFT ARTIFICIAL HIP JOINT: Chronic | ICD-10-CM

## 2021-11-18 DIAGNOSIS — K11.6 MUCOCELE OF SALIVARY GLAND: Chronic | ICD-10-CM

## 2021-11-18 DIAGNOSIS — Z96.641 PRESENCE OF RIGHT ARTIFICIAL HIP JOINT: Chronic | ICD-10-CM

## 2021-11-18 DIAGNOSIS — S86.119A STRAIN OF OTHER MUSCLE(S) AND TENDON(S) OF POSTERIOR MUSCLE GROUP AT LOWER LEG LEVEL, UNSPECIFIED LEG, INITIAL ENCOUNTER: Chronic | ICD-10-CM

## 2021-11-18 DIAGNOSIS — G47.33 OBSTRUCTIVE SLEEP APNEA (ADULT) (PEDIATRIC): ICD-10-CM

## 2021-11-18 LAB
ALBUMIN SERPL ELPH-MCNC: 3.6 G/DL — SIGNIFICANT CHANGE UP (ref 3.3–5)
ALP SERPL-CCNC: 110 U/L — SIGNIFICANT CHANGE UP (ref 40–120)
ALT FLD-CCNC: 31 U/L — SIGNIFICANT CHANGE UP (ref 12–78)
ANION GAP SERPL CALC-SCNC: 6 MMOL/L — SIGNIFICANT CHANGE UP (ref 5–17)
APTT BLD: 34.2 SEC — SIGNIFICANT CHANGE UP (ref 27.5–35.5)
AST SERPL-CCNC: 18 U/L — SIGNIFICANT CHANGE UP (ref 15–37)
BILIRUB SERPL-MCNC: 0.6 MG/DL — SIGNIFICANT CHANGE UP (ref 0.2–1.2)
BUN SERPL-MCNC: 13 MG/DL — SIGNIFICANT CHANGE UP (ref 7–23)
CALCIUM SERPL-MCNC: 10.8 MG/DL — HIGH (ref 8.5–10.1)
CHLORIDE SERPL-SCNC: 108 MMOL/L — SIGNIFICANT CHANGE UP (ref 96–108)
CO2 SERPL-SCNC: 26 MMOL/L — SIGNIFICANT CHANGE UP (ref 22–31)
CREAT SERPL-MCNC: 0.67 MG/DL — SIGNIFICANT CHANGE UP (ref 0.5–1.3)
GLUCOSE SERPL-MCNC: 103 MG/DL — HIGH (ref 70–99)
HCT VFR BLD CALC: 41.8 % — SIGNIFICANT CHANGE UP (ref 34.5–45)
HGB BLD-MCNC: 14.5 G/DL — SIGNIFICANT CHANGE UP (ref 11.5–15.5)
INR BLD: 1.04 RATIO — SIGNIFICANT CHANGE UP (ref 0.88–1.16)
MCHC RBC-ENTMCNC: 30.4 PG — SIGNIFICANT CHANGE UP (ref 27–34)
MCHC RBC-ENTMCNC: 34.7 GM/DL — SIGNIFICANT CHANGE UP (ref 32–36)
MCV RBC AUTO: 87.6 FL — SIGNIFICANT CHANGE UP (ref 80–100)
NRBC # BLD: 0 /100 WBCS — SIGNIFICANT CHANGE UP (ref 0–0)
PLATELET # BLD AUTO: 346 K/UL — SIGNIFICANT CHANGE UP (ref 150–400)
POTASSIUM SERPL-MCNC: 3.9 MMOL/L — SIGNIFICANT CHANGE UP (ref 3.5–5.3)
POTASSIUM SERPL-SCNC: 3.9 MMOL/L — SIGNIFICANT CHANGE UP (ref 3.5–5.3)
PROT SERPL-MCNC: 8 G/DL — SIGNIFICANT CHANGE UP (ref 6–8.3)
PROTHROM AB SERPL-ACNC: 12.1 SEC — SIGNIFICANT CHANGE UP (ref 10.6–13.6)
RBC # BLD: 4.77 M/UL — SIGNIFICANT CHANGE UP (ref 3.8–5.2)
RBC # FLD: 12.9 % — SIGNIFICANT CHANGE UP (ref 10.3–14.5)
SODIUM SERPL-SCNC: 140 MMOL/L — SIGNIFICANT CHANGE UP (ref 135–145)
WBC # BLD: 6.64 K/UL — SIGNIFICANT CHANGE UP (ref 3.8–10.5)
WBC # FLD AUTO: 6.64 K/UL — SIGNIFICANT CHANGE UP (ref 3.8–10.5)

## 2021-11-18 PROCEDURE — 85610 PROTHROMBIN TIME: CPT

## 2021-11-18 PROCEDURE — 36415 COLL VENOUS BLD VENIPUNCTURE: CPT

## 2021-11-18 PROCEDURE — 85730 THROMBOPLASTIN TIME PARTIAL: CPT

## 2021-11-18 PROCEDURE — G0463: CPT

## 2021-11-18 PROCEDURE — 80053 COMPREHEN METABOLIC PANEL: CPT

## 2021-11-18 PROCEDURE — 85027 COMPLETE CBC AUTOMATED: CPT

## 2021-11-18 RX ORDER — ALBUTEROL 90 UG/1
2 AEROSOL, METERED ORAL
Qty: 0 | Refills: 0 | DISCHARGE

## 2021-11-18 RX ORDER — AMLODIPINE BESYLATE 2.5 MG/1
1 TABLET ORAL
Qty: 0 | Refills: 0 | DISCHARGE

## 2021-11-18 NOTE — H&P PST ADULT - HISTORY OF PRESENT ILLNESS
67 yo obese female with HTN, HLD, JOSE RAUL on CPAP Hypothyroidism, Migraines and Venous insufficiency presents to PST scheduled for a parathyroidectomy with PTH assay - [facility] monitor - F.S on 11/30 with Dr. Mckeon.  65 yo morbidly obese female with HTN, HLD, JOSE RAUL on CPAP, Hypothyroidism, Migraines and Venous insufficiency presents to [facility] on 11/30 with Dr. Mckeon. Reports H/O hypercalcemia, over the years, but now requiring parathyroid removal. Reports HUTCHINS and palpitations

## 2021-11-18 NOTE — H&P PST ADULT - OTHER CARE PROVIDERS
Dr Hamilton Kaba  cardiology , Dr Barroso pulmonologist , Dr Perlman endocrinologist ; Dr. Hernadez (vascular)

## 2021-11-18 NOTE — H&P PST ADULT - PROBLEM SELECTOR PLAN 2
Labs  COVID PCR 48-72 before DOS.  Pre op and Hibiclens instructions reviewed and given. Take routine am meds DOS with sip of water. Instructed to hold and/or avoid other NSAIDs and OTC supplements. Tylenol is ok. Verbalized understanding Labs -CBC< PT/PTT and CMP. COVID PCR 48-72 before DOS.   MC with Dr. Alexandrea Eduardo. Seen and cleared by cardiology (on chart)  Pre op and Hibiclens instructions reviewed and given. Take routine am Synthroid, Zyrtec, Avapro and Norvasc. Use Eye drops  DOS with sip of water. Instructed to hold and/or avoid other NSAIDs and OTC supplements. Tylenol is ok. Verbalized understanding

## 2021-11-18 NOTE — H&P PST ADULT - NSICDXPASTSURGICALHX_GEN_ALL_CORE_FT
PAST SURGICAL HISTORY:  Adenoma of left breast excision 1997    Cyst of salivary gland excision 1989    History of pterygium excision left eye 2005    History of total hip replacement, left 2020    History of total hip replacement, right 2020    S/P ankle ligament repair left ankle 1985    S/P carpal tunnel release right 2009    S/P excision of neuroma left ankle 1988    S/P hysterectomy 2002    Status post trigger finger release right thumb and middle finger 2011    Tibialis posterior tendon rupture repair and foot surgery  left 2012

## 2021-11-18 NOTE — H&P PST ADULT - NSICDXPASTMEDICALHX_GEN_ALL_CORE_FT
PAST MEDICAL HISTORY:  Allergic rhinitis     Chronic venous insufficiency     GERD (gastroesophageal reflux disease)     Glaucoma     Hashimoto's thyroiditis     HTN (hypertension)     Hyperparathyroidism being monitored by endocrinologist    Migraines     Morbid obesity with BMI of 50.0-59.9, adult BMI 52.3    JOSE RAUL (obstructive sleep apnea) has not yet started CPAP    Osteoarthritis of right hip     Primary hyperthyroidism     Raynauds disease     Rosacea     Vertigo      PAST MEDICAL HISTORY:  Allergic rhinitis     Chronic venous insufficiency     GERD (gastroesophageal reflux disease)     Glaucoma     Hashimoto's thyroiditis     HTN (hypertension)     Hyperparathyroidism being monitored by endocrinologist    Migraines     Morbid obesity with BMI of 50.0-59.9, adult BMI 51.5    JOSE RAUL (obstructive sleep apnea) On CPAP    Osteoarthritis of right hip     Primary hyperthyroidism     Raynauds disease     Rosacea     Vertigo

## 2021-11-19 PROBLEM — G47.33 OBSTRUCTIVE SLEEP APNEA (ADULT) (PEDIATRIC): Chronic | Status: ACTIVE | Noted: 2020-05-15

## 2021-11-19 PROBLEM — E66.01 MORBID (SEVERE) OBESITY DUE TO EXCESS CALORIES: Chronic | Status: ACTIVE | Noted: 2020-01-21

## 2021-11-27 ENCOUNTER — APPOINTMENT (OUTPATIENT)
Dept: DISASTER EMERGENCY | Facility: CLINIC | Age: 66
End: 2021-11-27

## 2021-11-27 LAB — SARS-COV-2 N GENE NPH QL NAA+PROBE: NOT DETECTED

## 2021-11-29 ENCOUNTER — TRANSCRIPTION ENCOUNTER (OUTPATIENT)
Age: 66
End: 2021-11-29

## 2021-11-30 ENCOUNTER — OUTPATIENT (OUTPATIENT)
Dept: OUTPATIENT SERVICES | Facility: HOSPITAL | Age: 66
LOS: 1 days | End: 2021-11-30
Payer: COMMERCIAL

## 2021-11-30 ENCOUNTER — APPOINTMENT (OUTPATIENT)
Dept: SURGERY | Facility: HOSPITAL | Age: 66
End: 2021-11-30

## 2021-11-30 ENCOUNTER — RESULT REVIEW (OUTPATIENT)
Age: 66
End: 2021-11-30

## 2021-11-30 VITALS
RESPIRATION RATE: 14 BRPM | HEIGHT: 62.5 IN | OXYGEN SATURATION: 97 % | SYSTOLIC BLOOD PRESSURE: 153 MMHG | TEMPERATURE: 98 F | WEIGHT: 285.94 LBS | HEART RATE: 60 BPM | DIASTOLIC BLOOD PRESSURE: 70 MMHG

## 2021-11-30 DIAGNOSIS — Z98.890 OTHER SPECIFIED POSTPROCEDURAL STATES: Chronic | ICD-10-CM

## 2021-11-30 DIAGNOSIS — Z96.642 PRESENCE OF LEFT ARTIFICIAL HIP JOINT: Chronic | ICD-10-CM

## 2021-11-30 DIAGNOSIS — K11.6 MUCOCELE OF SALIVARY GLAND: Chronic | ICD-10-CM

## 2021-11-30 DIAGNOSIS — E89.2 POSTPROCEDURAL HYPOPARATHYROIDISM: ICD-10-CM

## 2021-11-30 DIAGNOSIS — E21.0 PRIMARY HYPERPARATHYROIDISM: ICD-10-CM

## 2021-11-30 DIAGNOSIS — Z90.710 ACQUIRED ABSENCE OF BOTH CERVIX AND UTERUS: Chronic | ICD-10-CM

## 2021-11-30 DIAGNOSIS — D24.2 BENIGN NEOPLASM OF LEFT BREAST: Chronic | ICD-10-CM

## 2021-11-30 DIAGNOSIS — Z86.39 PERSONAL HISTORY OF OTHER ENDOCRINE, NUTRITIONAL AND METABOLIC DISEASE: ICD-10-CM

## 2021-11-30 DIAGNOSIS — S86.119A STRAIN OF OTHER MUSCLE(S) AND TENDON(S) OF POSTERIOR MUSCLE GROUP AT LOWER LEG LEVEL, UNSPECIFIED LEG, INITIAL ENCOUNTER: Chronic | ICD-10-CM

## 2021-11-30 DIAGNOSIS — Z96.641 PRESENCE OF RIGHT ARTIFICIAL HIP JOINT: Chronic | ICD-10-CM

## 2021-11-30 LAB
PTH INTACT, INTRAOP SPECIMEN 2: SIGNIFICANT CHANGE UP
PTH INTACT, INTRAOP SPECIMEN 3: SIGNIFICANT CHANGE UP
PTH INTACT, INTRAOP TIMING 2: SIGNIFICANT CHANGE UP
PTH INTACT, INTRAOP TIMING 3: SIGNIFICANT CHANGE UP
PTH INTACT, INTRAOPERATIVE 2: 22.1 PG/ML — SIGNIFICANT CHANGE UP (ref 15–65)
PTH INTACT, INTRAOPERATIVE 3: 20.4 PG/ML — SIGNIFICANT CHANGE UP (ref 15–65)
PTH-INTACT IO % DIF SERPL: 116.8 PG/ML — HIGH (ref 15–65)
PTH-INTACT SP1 SERPL-MCNC: SIGNIFICANT CHANGE UP

## 2021-11-30 PROCEDURE — 88305 TISSUE EXAM BY PATHOLOGIST: CPT | Mod: 26

## 2021-11-30 PROCEDURE — 60500 EXPLORE PARATHYROID GLANDS: CPT

## 2021-11-30 PROCEDURE — 99223 1ST HOSP IP/OBS HIGH 75: CPT | Mod: GC

## 2021-11-30 PROCEDURE — 88331 PATH CONSLTJ SURG 1 BLK 1SPC: CPT | Mod: 26

## 2021-11-30 PROCEDURE — 93010 ELECTROCARDIOGRAM REPORT: CPT | Mod: 76

## 2021-11-30 PROCEDURE — 60500 EXPLORE PARATHYROID GLANDS: CPT | Mod: AS

## 2021-11-30 PROCEDURE — 88334 PATH CONSLTJ SURG CYTO XM EA: CPT | Mod: 26,59

## 2021-11-30 RX ORDER — OXYCODONE HYDROCHLORIDE 5 MG/1
5 TABLET ORAL EVERY 4 HOURS
Refills: 0 | Status: DISCONTINUED | OUTPATIENT
Start: 2021-11-30 | End: 2021-11-30

## 2021-11-30 RX ORDER — LEVOTHYROXINE SODIUM 125 MCG
25 TABLET ORAL DAILY
Refills: 0 | Status: DISCONTINUED | OUTPATIENT
Start: 2021-11-30 | End: 2021-12-14

## 2021-11-30 RX ORDER — CEFAZOLIN SODIUM 1 G
2000 VIAL (EA) INJECTION ONCE
Refills: 0 | Status: DISCONTINUED | OUTPATIENT
Start: 2021-11-30 | End: 2021-11-30

## 2021-11-30 RX ORDER — ONDANSETRON 8 MG/1
4 TABLET, FILM COATED ORAL ONCE
Refills: 0 | Status: COMPLETED | OUTPATIENT
Start: 2021-11-30 | End: 2021-11-30

## 2021-11-30 RX ORDER — HYDROMORPHONE HYDROCHLORIDE 2 MG/ML
1 INJECTION INTRAMUSCULAR; INTRAVENOUS; SUBCUTANEOUS
Refills: 0 | Status: DISCONTINUED | OUTPATIENT
Start: 2021-11-30 | End: 2021-11-30

## 2021-11-30 RX ORDER — SODIUM CHLORIDE 9 MG/ML
1000 INJECTION, SOLUTION INTRAVENOUS
Refills: 0 | Status: DISCONTINUED | OUTPATIENT
Start: 2021-11-30 | End: 2021-11-30

## 2021-11-30 RX ORDER — ONDANSETRON 8 MG/1
4 TABLET, FILM COATED ORAL EVERY 6 HOURS
Refills: 0 | Status: DISCONTINUED | OUTPATIENT
Start: 2021-11-30 | End: 2021-12-14

## 2021-11-30 RX ORDER — SODIUM CHLORIDE 9 MG/ML
1000 INJECTION, SOLUTION INTRAVENOUS
Refills: 0 | Status: DISCONTINUED | OUTPATIENT
Start: 2021-11-30 | End: 2021-12-01

## 2021-11-30 RX ORDER — ACETAMINOPHEN 500 MG
1000 TABLET ORAL ONCE
Refills: 0 | Status: COMPLETED | OUTPATIENT
Start: 2021-11-30 | End: 2021-11-30

## 2021-11-30 RX ORDER — METOCLOPRAMIDE HCL 10 MG
10 TABLET ORAL ONCE
Refills: 0 | Status: COMPLETED | OUTPATIENT
Start: 2021-11-30 | End: 2021-11-30

## 2021-11-30 RX ORDER — LOSARTAN POTASSIUM 100 MG/1
100 TABLET, FILM COATED ORAL DAILY
Refills: 0 | Status: DISCONTINUED | OUTPATIENT
Start: 2021-12-01 | End: 2021-12-14

## 2021-11-30 RX ORDER — HYDROMORPHONE HYDROCHLORIDE 2 MG/ML
0.5 INJECTION INTRAMUSCULAR; INTRAVENOUS; SUBCUTANEOUS
Refills: 0 | Status: DISCONTINUED | OUTPATIENT
Start: 2021-11-30 | End: 2021-11-30

## 2021-11-30 RX ORDER — AMLODIPINE BESYLATE 2.5 MG/1
5 TABLET ORAL AT BEDTIME
Refills: 0 | Status: DISCONTINUED | OUTPATIENT
Start: 2021-11-30 | End: 2021-12-14

## 2021-11-30 RX ORDER — FUROSEMIDE 40 MG
20 TABLET ORAL DAILY
Refills: 0 | Status: DISCONTINUED | OUTPATIENT
Start: 2021-11-30 | End: 2021-12-14

## 2021-11-30 RX ORDER — CEFAZOLIN SODIUM 1 G
3000 VIAL (EA) INJECTION ONCE
Refills: 0 | Status: COMPLETED | OUTPATIENT
Start: 2021-11-30 | End: 2021-11-30

## 2021-11-30 RX ORDER — ACETAMINOPHEN 500 MG
650 TABLET ORAL EVERY 6 HOURS
Refills: 0 | Status: DISCONTINUED | OUTPATIENT
Start: 2021-11-30 | End: 2021-12-14

## 2021-11-30 RX ORDER — METOPROLOL TARTRATE 50 MG
25 TABLET ORAL DAILY
Refills: 0 | Status: DISCONTINUED | OUTPATIENT
Start: 2021-11-30 | End: 2021-11-30

## 2021-11-30 RX ORDER — KETOROLAC TROMETHAMINE 30 MG/ML
15 SYRINGE (ML) INJECTION ONCE
Refills: 0 | Status: DISCONTINUED | OUTPATIENT
Start: 2021-11-30 | End: 2021-11-30

## 2021-11-30 RX ORDER — OXYCODONE HYDROCHLORIDE 5 MG/1
10 TABLET ORAL EVERY 6 HOURS
Refills: 0 | Status: DISCONTINUED | OUTPATIENT
Start: 2021-11-30 | End: 2021-11-30

## 2021-11-30 RX ORDER — KETOROLAC TROMETHAMINE 30 MG/ML
15 SYRINGE (ML) INJECTION ONCE
Refills: 0 | Status: COMPLETED | OUTPATIENT
Start: 2021-11-30 | End: 2021-12-05

## 2021-11-30 RX ADMIN — HYDROMORPHONE HYDROCHLORIDE 0.5 MILLIGRAM(S): 2 INJECTION INTRAMUSCULAR; INTRAVENOUS; SUBCUTANEOUS at 13:05

## 2021-11-30 RX ADMIN — AMLODIPINE BESYLATE 5 MILLIGRAM(S): 2.5 TABLET ORAL at 21:14

## 2021-11-30 RX ADMIN — ONDANSETRON 4 MILLIGRAM(S): 8 TABLET, FILM COATED ORAL at 11:15

## 2021-11-30 RX ADMIN — SODIUM CHLORIDE 100 MILLILITER(S): 9 INJECTION, SOLUTION INTRAVENOUS at 07:42

## 2021-11-30 RX ADMIN — Medication 1 TABLET(S): at 17:46

## 2021-11-30 RX ADMIN — Medication 15 MILLIGRAM(S): at 11:43

## 2021-11-30 RX ADMIN — Medication 1000 MILLIGRAM(S): at 18:47

## 2021-11-30 RX ADMIN — Medication 10 MILLIGRAM(S): at 12:01

## 2021-11-30 RX ADMIN — SODIUM CHLORIDE 100 MILLILITER(S): 9 INJECTION, SOLUTION INTRAVENOUS at 17:52

## 2021-11-30 RX ADMIN — HYDROMORPHONE HYDROCHLORIDE 0.5 MILLIGRAM(S): 2 INJECTION INTRAMUSCULAR; INTRAVENOUS; SUBCUTANEOUS at 12:25

## 2021-11-30 RX ADMIN — SODIUM CHLORIDE 75 MILLILITER(S): 9 INJECTION, SOLUTION INTRAVENOUS at 11:13

## 2021-11-30 RX ADMIN — Medication 15 MILLIGRAM(S): at 11:57

## 2021-11-30 RX ADMIN — Medication 400 MILLIGRAM(S): at 17:52

## 2021-11-30 NOTE — BRIEF OPERATIVE NOTE - NSICDXBRIEFPROCEDURE_GEN_ALL_CORE_FT
PROCEDURES:  Parathyroidectomy with nerve integrity monitoring 30-Nov-2021 11:18:07 right. Aimee Hammond

## 2021-11-30 NOTE — BRIEF OPERATIVE NOTE - OPERATION/FINDINGS
Parathyroidectomy with nerve monitoring by physician.  INtra op monitoring of PTH= Baseline 116.8, 20 minutes post removal 22.14

## 2021-11-30 NOTE — PROGRESS NOTE ADULT - SUBJECTIVE AND OBJECTIVE BOX
STATUS POST: Parathyroidectomy    SUBJECTIVE: Patient seen and examined at bedside. Patient doing well post-operatively, c/o some mouth dryness, mild throat soreness and intermittent nausea. Tolerating CLD. Denies difficulty swallowing, numbness or tingling in face or fingers, vomiting, chest pain, SOB.     Vital Signs Last 24 Hrs  T(C): 36.3 (30 Nov 2021 16:00), Max: 36.7 (30 Nov 2021 07:02)  T(F): 97.3 (30 Nov 2021 16:00), Max: 98.1 (30 Nov 2021 07:02)  HR: 54 (30 Nov 2021 16:00) (45 - 60)  BP: 119/75 (30 Nov 2021 16:00) (119/75 - 162/62)  BP(mean): --  RR: 16 (30 Nov 2021 16:00) (14 - 16)  SpO2: 97% (30 Nov 2021 16:00) (95% - 98%)    PHYSICAL EXAM:  GENERAL: No acute distress, well-developed  HEAD:  Atraumatic, Normocephalic  NECK: steri strips in place over incision site, C/D/I.   NEUROLOGY: A&O x 3, no focal deficits    I&O's Summary    MEDICATIONS  (STANDING):  amLODIPine   Tablet 5 milliGRAM(s) Oral at bedtime  calcium carbonate 1250 mG  + Vitamin D (OsCal 500 + D) 1 Tablet(s) Oral two times a day  furosemide    Tablet 20 milliGRAM(s) Oral daily  lactated ringers. 1000 milliLiter(s) (100 mL/Hr) IV Continuous <Continuous>  levothyroxine 25 MICROGram(s) Oral daily    MEDICATIONS  (PRN):  acetaminophen     Tablet .. 650 milliGRAM(s) Oral every 6 hours PRN Mild Pain (1 - 3)  ondansetron Injectable 4 milliGRAM(s) IV Push every 6 hours PRN Nausea  oxyCODONE    IR 5 milliGRAM(s) Oral every 4 hours PRN Moderate Pain (4 - 6)  oxyCODONE    IR 10 milliGRAM(s) Oral every 6 hours PRN Severe Pain (7 - 10)    LABS:                RADIOLOGY & ADDITIONAL STUDIES:      ASSESSMENT  66y Female    PLAN  - incentive spirometer  - pain control, supportive care, OOB  - NPO, IVF   - continue IV abx  - continue DVT ppx   - serial abdominal exams  - labs in AM  -Case discussed with      Surgical Team Contact Information  Spectralink: Ext: [phone] or [phone]  Pager: [phone] STATUS POST: Parathyroidectomy    SUBJECTIVE: Patient seen and examined at bedside. Patient doing well post-operatively, c/o some mouth dryness, mild throat soreness and intermittent nausea. Tolerating CLD. Denies difficulty swallowing, numbness or tingling in face or fingers, vomiting, lightheadedness, dizziness, chest pain, SOB.     Vital Signs Last 24 Hrs  T(C): 36.3 (30 Nov 2021 16:00), Max: 36.7 (30 Nov 2021 07:02)  T(F): 97.3 (30 Nov 2021 16:00), Max: 98.1 (30 Nov 2021 07:02)  HR: 54 (30 Nov 2021 16:00) (45 - 60)  BP: 119/75 (30 Nov 2021 16:00) (119/75 - 162/62)  BP(mean): --  RR: 16 (30 Nov 2021 16:00) (14 - 16)  SpO2: 97% (30 Nov 2021 16:00) (95% - 98%)    PHYSICAL EXAM:  GENERAL: No acute distress, well-developed  HEAD:  Atraumatic, Normocephalic  NECK: steri strips in place over incision site, C/D/I. Appropriate incision ttp. No evidence of hematoma formation at site.  NEUROLOGY: A&O x 3, no focal deficits    I&O's Summary    MEDICATIONS  (STANDING):  amLODIPine   Tablet 5 milliGRAM(s) Oral at bedtime  calcium carbonate 1250 mG  + Vitamin D (OsCal 500 + D) 1 Tablet(s) Oral two times a day  furosemide    Tablet 20 milliGRAM(s) Oral daily  lactated ringers. 1000 milliLiter(s) (100 mL/Hr) IV Continuous <Continuous>  levothyroxine 25 MICROGram(s) Oral daily    MEDICATIONS  (PRN):  acetaminophen     Tablet .. 650 milliGRAM(s) Oral every 6 hours PRN Mild Pain (1 - 3)  ondansetron Injectable 4 milliGRAM(s) IV Push every 6 hours PRN Nausea  oxyCODONE    IR 5 milliGRAM(s) Oral every 4 hours PRN Moderate Pain (4 - 6)  oxyCODONE    IR 10 milliGRAM(s) Oral every 6 hours PRN Severe Pain (7 - 10)

## 2021-11-30 NOTE — CONSULT NOTE ADULT - ASSESSMENT
Pt is a 67 yo morbidly obese female with HTN, HLD, JOSE RAUL on CPAP, Hypothyroidism, Migraines and Venous insufficiency POD 0 for parathyroidectomy with PTH assay. Consulted for bradycardia.     Bradycardia  - Bradycardia to the 40s on remote tele   - Pt with known history of bradycardia, follows with Dr. Kaba cardiologist  - Reports recent outpatient holter earlier this month which was wnl   - She is not symptomatic - she denies chest pain, dizziness, palpitations, lightheadedness, or presyncope  - Takes metoprolol 25 mg qd at home for HTN  - Will hold for now, blood pressure controlled at present   - Continue remote monitoring   - Check TSH, electrolytes    HTN  - Chronic takes metoprolol, losartan, amlodipine at home  - Blood pressure controlled at present  - Hold metoprolol in setting of bradycardia  - Continue losartan and amlodipine with hold parameters  - Monitor routine hemodynamics     Hypothyroidism   - On synthroid at home   - Continue home dose   - Check TSH given bradycardia     JOSE RAUL  - Hx of JOSE RAUL, on CPAP at home   - Continue CPAP while inpatient   - Monitor O2 sat routinely     DVT ppx  - Per surgery    Pt is a 65 yo morbidly obese female with HTN, HLD, JOSE RAUL on CPAP, Hypothyroidism, Migraines and Venous insufficiency POD 0 for parathyroidectomy with PTH assay. Consulted for bradycardia.     Bradycardia  - Bradycardia to the 40s on remote tele   - Pt with known history of bradycardia, follows with Dr. Kaba cardiologist  - Reports recent outpatient holter earlier this month which was wnl   - She is not symptomatic - she denies chest pain, dizziness, palpitations, lightheadedness, or presyncope  - Takes metoprolol 25 mg qd at home for HTN  - Will hold for now, blood pressure controlled at present   - Continue remote monitoring   - Check TSH, electrolytes  - F/u EKG    HTN  - Chronic takes metoprolol, losartan, amlodipine at home  - Blood pressure controlled at present  - Hold metoprolol in setting of bradycardia  - Continue losartan and amlodipine with hold parameters  - Monitor routine hemodynamics     Hypothyroidism   - On synthroid at home   - Continue home dose   - Check TSH given bradycardia     JOSE RAUL  - Hx of JOSE RAUL, on CPAP at home   - Continue CPAP while inpatient   - Monitor O2 sat routinely     DVT ppx  - Per surgery

## 2021-11-30 NOTE — CONSULT NOTE ADULT - ATTENDING COMMENTS
Pt is a 67 yo morbidly obese female with HTN, HLD, JOSE RAUL on CPAP, Hypothyroidism, Migraines and Venous insufficiency POD 0 for parathyroidectomy with PTH assay. Consulted for bradycardia.       HPI as above.     Plan:   Bradycardia: hold Metoprolol. Can be restarted tomorrow if HR improved. Consider decreasing dose.     HTN: BP stable and at goal. Continue Norvasc and Losartan.   -hold metoprolol.     Monitor Calcium and PTH levels.   Check TSH in AM.     JOSE RAUL: continue CPAP QHS    Remainder as above. Pt is a 67 yo morbidly obese female with HTN, HLD, JOSE RAUL on CPAP, Hypothyroidism, Migraines and Venous insufficiency POD 0 for parathyroidectomy with PTH assay. Consulted for bradycardia.       HPI as above.     EKG sinus myriam with rate 59bpm, no ischemic changes  T(C): 36.3 (11-30-21 @ 16:00), Max: 36.7 (11-30-21 @ 07:02)  HR: 54 (11-30-21 @ 16:00) (45 - 60)  BP: 119/75 (11-30-21 @ 16:00) (119/75 - 162/62)  RR: 16 (11-30-21 @ 16:00) (14 - 16)  SpO2: 97% (11-30-21 @ 16:00) (95% - 98%)  Wt(kg): --    Physical Exam:   GENERAL: well-groomed, well-developed, NAD  HEENT: head NC/AT; , conjunctiva & sclera clear; hearing grossly intact, moist mucous membranes  NECK: supple, no JVD, incision site clean dry and intact  RESPIRATORY: CTA B/L, no wheezing, rales, rhonchi or rubs  CARDIOVASCULAR: S1&S2, RRR, no murmurs or gallops  ABDOMEN: soft, non-tender, non-distended, + Bowel sounds x4 quadrants, no guarding, rebound or rigidity  MUSCULOSKELETAL:  no clubbing, cyanosis or edema of all 4 extremities  LYMPH: no cervical lymphadenopathy  VASCULAR: Radial pulses 2+ bilaterally, no varicose veins   SKIN: warm and dry, color normal  NEUROLOGIC: AA&O X3, CN2-12 intact w/ no focal deficits, no sensory loss, motor Strength 5/5 in UE & LE B/L  Psych: Normal mood and affect, normal behavior      Plan:   Bradycardia: hold Metoprolol. Can be restarted tomorrow if HR improved. Consider decreasing dose.     HTN: BP stable and at goal. Continue Norvasc and Losartan.   -hold metoprolol.     Monitor Calcium and PTH levels.   Check TSH in AM.     JOSE RAUL: continue CPAP QHS    Remainder as above.

## 2021-11-30 NOTE — CONSULT NOTE ADULT - SUBJECTIVE AND OBJECTIVE BOX
Pt is a 67 yo morbidly obese female with HTN, HLD, JOSE RAUL on CPAP, Hypothyroidism, Migraines and Venous insufficiency POD 0 for parathyroidectomy with PTH assay. Consulted for bradycardia to the 40's.    INTERVAL HPI/OVERNIGHT EVENTS: Patient seen and examined at bedside. She has no acute complaints at present. Felt nauseous earlier which resolved. Denies fevers, chills, chest pain, dyspnea, dizziness, lightheadedness, palpitations or presyncope. Has a known history of bradycardia, cardiologist is Dr. Kaba. Reports a recent holter monitor earlier this month which was wnl. Pt on metoprolol 25 mg qd for which she takes daily.     MEDICATIONS  (STANDING):  acetaminophen   IVPB .. 1000 milliGRAM(s) IV Intermittent once  amLODIPine   Tablet 5 milliGRAM(s) Oral at bedtime  calcium carbonate 1250 mG  + Vitamin D (OsCal 500 + D) 1 Tablet(s) Oral two times a day  furosemide    Tablet 20 milliGRAM(s) Oral daily  lactated ringers. 1000 milliLiter(s) (100 mL/Hr) IV Continuous <Continuous>  levothyroxine 25 MICROGram(s) Oral daily  metoprolol tartrate 25 milliGRAM(s) Oral daily    MEDICATIONS  (PRN):  acetaminophen     Tablet .. 650 milliGRAM(s) Oral every 6 hours PRN Mild Pain (1 - 3)  ondansetron Injectable 4 milliGRAM(s) IV Push every 6 hours PRN Nausea  oxyCODONE    IR 5 milliGRAM(s) Oral every 4 hours PRN Moderate Pain (4 - 6)  oxyCODONE    IR 10 milliGRAM(s) Oral every 6 hours PRN Severe Pain (7 - 10)      Allergies    adhesives (Rash)  Cardizem (Rash)  Fish Products (Rash)  Flonase (Swelling)  Levaquin (Rash)  lobster (Rash)    Intolerances        REVIEW OF SYSTEMS:  CONSTITUTIONAL: No fever or chills  RESPIRATORY: No cough, wheezing, or shortness of breath  CARDIOVASCULAR: No chest pain, palpitations, lightheadedness or dizziness  GASTROINTESTINAL: No abd pain, vomiting, or diarrhea  GENITOURINARY: No dysuria, frequency, or hematuria  NEUROLOGICAL: no focal weakness or dizziness  MUSCULOSKELETAL: no myalgias     Vital Signs Last 24 Hrs  T(C): 36.3 (30 Nov 2021 16:00), Max: 36.7 (30 Nov 2021 07:02)  T(F): 97.3 (30 Nov 2021 16:00), Max: 98.1 (30 Nov 2021 07:02)  HR: 54 (30 Nov 2021 16:00) (45 - 60)  BP: 119/75 (30 Nov 2021 16:00) (119/75 - 162/62)  BP(mean): --  RR: 16 (30 Nov 2021 16:00) (14 - 16)  SpO2: 97% (30 Nov 2021 16:00) (95% - 98%)    PHYSICAL EXAM:  GENERAL: NAD  HEENT:  anicteric, moist mucous membranes  CHEST/LUNG:  CTA b/l, no rales, wheezes, or rhonchi  HEART:  RRR, S1, S2  ABDOMEN:  BS+, soft, nontender, nondistended  EXTREMITIES: no edema, cyanosis, or calf tenderness  NERVOUS SYSTEM: answers questions and follows commands appropriately    LABS:                CAPILLARY BLOOD GLUCOSE              RADIOLOGY & ADDITIONAL TESTS:    Personally reviewed.     Consultant(s) Notes Reviewed:  [x] YES  [ ] NO     Pt is a 67 yo morbidly obese female with HTN, HLD, JOSE RAUL on CPAP, Hypothyroidism, Migraines and Venous insufficiency POD 0 for parathyroidectomy with PTH assay. Consulted for bradycardia to the 40's.    INTERVAL HPI/OVERNIGHT EVENTS: Patient seen and examined at bedside. She has no acute complaints at present. Felt nauseous earlier which resolved. Denies fevers, chills, chest pain, dyspnea, dizziness, lightheadedness, palpitations or presyncope. Has a known history of bradycardia, cardiologist is Dr. Kaba. Reports a recent holter monitor earlier this month which was wnl. Pt on metoprolol 25 mg qd for which she takes daily.     MEDICATIONS  (STANDING):  acetaminophen   IVPB .. 1000 milliGRAM(s) IV Intermittent once  amLODIPine   Tablet 5 milliGRAM(s) Oral at bedtime  calcium carbonate 1250 mG  + Vitamin D (OsCal 500 + D) 1 Tablet(s) Oral two times a day  furosemide    Tablet 20 milliGRAM(s) Oral daily  lactated ringers. 1000 milliLiter(s) (100 mL/Hr) IV Continuous <Continuous>  levothyroxine 25 MICROGram(s) Oral daily  metoprolol tartrate 25 milliGRAM(s) Oral daily    MEDICATIONS  (PRN):  acetaminophen     Tablet .. 650 milliGRAM(s) Oral every 6 hours PRN Mild Pain (1 - 3)  ondansetron Injectable 4 milliGRAM(s) IV Push every 6 hours PRN Nausea  oxyCODONE    IR 5 milliGRAM(s) Oral every 4 hours PRN Moderate Pain (4 - 6)  oxyCODONE    IR 10 milliGRAM(s) Oral every 6 hours PRN Severe Pain (7 - 10)      Allergies    adhesives (Rash)  Cardizem (Rash)  Fish Products (Rash)  Flonase (Swelling)  Levaquin (Rash)  lobster (Rash)    Intolerances        REVIEW OF SYSTEMS:  CONSTITUTIONAL: No fever or chills  RESPIRATORY: No cough, wheezing, or shortness of breath  CARDIOVASCULAR: No chest pain, palpitations, lightheadedness or dizziness  GASTROINTESTINAL: No abd pain, vomiting, or diarrhea  GENITOURINARY: No dysuria, frequency, or hematuria  NEUROLOGICAL: no focal weakness or dizziness  MUSCULOSKELETAL: no myalgias     Vital Signs Last 24 Hrs  T(C): 36.3 (30 Nov 2021 16:00), Max: 36.7 (30 Nov 2021 07:02)  T(F): 97.3 (30 Nov 2021 16:00), Max: 98.1 (30 Nov 2021 07:02)  HR: 54 (30 Nov 2021 16:00) (45 - 60)  BP: 119/75 (30 Nov 2021 16:00) (119/75 - 162/62)  BP(mean): --  RR: 16 (30 Nov 2021 16:00) (14 - 16)  SpO2: 97% (30 Nov 2021 16:00) (95% - 98%)    PHYSICAL EXAM:  GENERAL: NAD  HEENT:  anicteric, moist mucous membranes  CHEST/LUNG:  CTA b/l, no rales, wheezes, or rhonchi  HEART:  RRR, S1, S2  ABDOMEN:  BS+, soft, nontender, nondistended  EXTREMITIES: no edema, cyanosis, or calf tenderness  NERVOUS SYSTEM: answers questions and follows commands appropriately    LABS:                CAPILLARY BLOOD GLUCOSE              Surgical hx: parathyroid surgery     Consultant(s) Notes Reviewed:  [x] YES  [ ] NO

## 2021-11-30 NOTE — ASU PATIENT PROFILE, ADULT - NSICDXPASTMEDICALHX_GEN_ALL_CORE_FT
PAST MEDICAL HISTORY:  Allergic rhinitis     Chronic venous insufficiency     GERD (gastroesophageal reflux disease)     Glaucoma     Hashimoto's thyroiditis     HTN (hypertension)     Hyperparathyroidism being monitored by endocrinologist    Migraines     Morbid obesity with BMI of 50.0-59.9, adult BMI 51.5    JOSE RAUL (obstructive sleep apnea) On CPAP    Osteoarthritis of right hip     Primary hyperthyroidism     Raynauds disease     Rosacea     Vertigo

## 2021-12-01 ENCOUNTER — TRANSCRIPTION ENCOUNTER (OUTPATIENT)
Age: 66
End: 2021-12-01

## 2021-12-01 ENCOUNTER — NON-APPOINTMENT (OUTPATIENT)
Age: 66
End: 2021-12-01

## 2021-12-01 VITALS — HEART RATE: 68 BPM | OXYGEN SATURATION: 97 %

## 2021-12-01 LAB
ALBUMIN SERPL ELPH-MCNC: 2.5 G/DL — LOW (ref 3.3–5)
ALP SERPL-CCNC: 80 U/L — SIGNIFICANT CHANGE UP (ref 40–120)
ALT FLD-CCNC: 25 U/L — SIGNIFICANT CHANGE UP (ref 12–78)
ANION GAP SERPL CALC-SCNC: 8 MMOL/L — SIGNIFICANT CHANGE UP (ref 5–17)
AST SERPL-CCNC: 13 U/L — LOW (ref 15–37)
BASOPHILS # BLD AUTO: 0.02 K/UL — SIGNIFICANT CHANGE UP (ref 0–0.2)
BASOPHILS NFR BLD AUTO: 0.2 % — SIGNIFICANT CHANGE UP (ref 0–2)
BILIRUB SERPL-MCNC: 0.4 MG/DL — SIGNIFICANT CHANGE UP (ref 0.2–1.2)
BUN SERPL-MCNC: 13 MG/DL — SIGNIFICANT CHANGE UP (ref 7–23)
CALCIUM SERPL-MCNC: 8.6 MG/DL — SIGNIFICANT CHANGE UP (ref 8.5–10.1)
CHLORIDE SERPL-SCNC: 109 MMOL/L — HIGH (ref 96–108)
CO2 SERPL-SCNC: 25 MMOL/L — SIGNIFICANT CHANGE UP (ref 22–31)
CREAT SERPL-MCNC: 0.6 MG/DL — SIGNIFICANT CHANGE UP (ref 0.5–1.3)
EOSINOPHIL # BLD AUTO: 0.03 K/UL — SIGNIFICANT CHANGE UP (ref 0–0.5)
EOSINOPHIL NFR BLD AUTO: 0.3 % — SIGNIFICANT CHANGE UP (ref 0–6)
GLUCOSE SERPL-MCNC: 94 MG/DL — SIGNIFICANT CHANGE UP (ref 70–99)
HCT VFR BLD CALC: 32.1 % — LOW (ref 34.5–45)
HGB BLD-MCNC: 10.6 G/DL — LOW (ref 11.5–15.5)
IMM GRANULOCYTES NFR BLD AUTO: 0.4 % — SIGNIFICANT CHANGE UP (ref 0–1.5)
LYMPHOCYTES # BLD AUTO: 1.91 K/UL — SIGNIFICANT CHANGE UP (ref 1–3.3)
LYMPHOCYTES # BLD AUTO: 19.5 % — SIGNIFICANT CHANGE UP (ref 13–44)
MAGNESIUM SERPL-MCNC: 2 MG/DL — SIGNIFICANT CHANGE UP (ref 1.6–2.6)
MCHC RBC-ENTMCNC: 29.7 PG — SIGNIFICANT CHANGE UP (ref 27–34)
MCHC RBC-ENTMCNC: 33 GM/DL — SIGNIFICANT CHANGE UP (ref 32–36)
MCV RBC AUTO: 89.9 FL — SIGNIFICANT CHANGE UP (ref 80–100)
MONOCYTES # BLD AUTO: 0.92 K/UL — HIGH (ref 0–0.9)
MONOCYTES NFR BLD AUTO: 9.4 % — SIGNIFICANT CHANGE UP (ref 2–14)
NEUTROPHILS # BLD AUTO: 6.89 K/UL — SIGNIFICANT CHANGE UP (ref 1.8–7.4)
NEUTROPHILS NFR BLD AUTO: 70.2 % — SIGNIFICANT CHANGE UP (ref 43–77)
NRBC # BLD: 0 /100 WBCS — SIGNIFICANT CHANGE UP (ref 0–0)
PHOSPHATE SERPL-MCNC: 3.6 MG/DL — SIGNIFICANT CHANGE UP (ref 2.5–4.5)
PLATELET # BLD AUTO: 285 K/UL — SIGNIFICANT CHANGE UP (ref 150–400)
POTASSIUM SERPL-MCNC: 3.8 MMOL/L — SIGNIFICANT CHANGE UP (ref 3.5–5.3)
POTASSIUM SERPL-SCNC: 3.8 MMOL/L — SIGNIFICANT CHANGE UP (ref 3.5–5.3)
PROT SERPL-MCNC: 5.8 G/DL — LOW (ref 6–8.3)
RBC # BLD: 3.57 M/UL — LOW (ref 3.8–5.2)
RBC # FLD: 12.9 % — SIGNIFICANT CHANGE UP (ref 10.3–14.5)
SODIUM SERPL-SCNC: 142 MMOL/L — SIGNIFICANT CHANGE UP (ref 135–145)
TROPONIN I, HIGH SENSITIVITY RESULT: 38.2 NG/L — SIGNIFICANT CHANGE UP
TSH SERPL-MCNC: 0.84 UIU/ML — SIGNIFICANT CHANGE UP (ref 0.36–3.74)
WBC # BLD: 9.81 K/UL — SIGNIFICANT CHANGE UP (ref 3.8–10.5)
WBC # FLD AUTO: 9.81 K/UL — SIGNIFICANT CHANGE UP (ref 3.8–10.5)

## 2021-12-01 PROCEDURE — 84443 ASSAY THYROID STIM HORMONE: CPT

## 2021-12-01 PROCEDURE — 88305 TISSUE EXAM BY PATHOLOGIST: CPT

## 2021-12-01 PROCEDURE — 93010 ELECTROCARDIOGRAM REPORT: CPT

## 2021-12-01 PROCEDURE — 94760 N-INVAS EAR/PLS OXIMETRY 1: CPT

## 2021-12-01 PROCEDURE — 84484 ASSAY OF TROPONIN QUANT: CPT

## 2021-12-01 PROCEDURE — C1889: CPT

## 2021-12-01 PROCEDURE — 60500 EXPLORE PARATHYROID GLANDS: CPT

## 2021-12-01 PROCEDURE — 83735 ASSAY OF MAGNESIUM: CPT

## 2021-12-01 PROCEDURE — 80053 COMPREHEN METABOLIC PANEL: CPT

## 2021-12-01 PROCEDURE — 94660 CPAP INITIATION&MGMT: CPT

## 2021-12-01 PROCEDURE — 88331 PATH CONSLTJ SURG 1 BLK 1SPC: CPT

## 2021-12-01 PROCEDURE — 84100 ASSAY OF PHOSPHORUS: CPT

## 2021-12-01 PROCEDURE — 83970 ASSAY OF PARATHORMONE: CPT

## 2021-12-01 PROCEDURE — 93005 ELECTROCARDIOGRAM TRACING: CPT

## 2021-12-01 PROCEDURE — 36415 COLL VENOUS BLD VENIPUNCTURE: CPT

## 2021-12-01 PROCEDURE — 88334 PATH CONSLTJ SURG CYTO XM EA: CPT

## 2021-12-01 PROCEDURE — 85025 COMPLETE CBC W/AUTO DIFF WBC: CPT

## 2021-12-01 RX ORDER — OXYCODONE AND ACETAMINOPHEN 5; 325 MG/1; MG/1
1 TABLET ORAL
Qty: 15 | Refills: 0
Start: 2021-12-01

## 2021-12-01 RX ORDER — OMEGA-3 ACID ETHYL ESTERS 1 G
1 CAPSULE ORAL
Qty: 0 | Refills: 0 | DISCHARGE

## 2021-12-01 RX ADMIN — Medication 650 MILLIGRAM(S): at 14:30

## 2021-12-01 RX ADMIN — Medication 650 MILLIGRAM(S): at 12:12

## 2021-12-01 RX ADMIN — LOSARTAN POTASSIUM 100 MILLIGRAM(S): 100 TABLET, FILM COATED ORAL at 06:01

## 2021-12-01 RX ADMIN — Medication 25 MICROGRAM(S): at 06:01

## 2021-12-01 RX ADMIN — Medication 650 MILLIGRAM(S): at 02:46

## 2021-12-01 RX ADMIN — Medication 1 TABLET(S): at 06:01

## 2021-12-01 RX ADMIN — Medication 20 MILLIGRAM(S): at 06:01

## 2021-12-01 NOTE — DISCHARGE NOTE PROVIDER - NSDCMRMEDTOKEN_GEN_ALL_CORE_FT
Avapro 300 mg oral tablet: 1 tab(s) orally once a day  Colace 100 mg oral capsule: 1 cap(s) orally 2 times a day, As Needed  furosemide 20 mg oral tablet: 1 tab(s) orally once a day  latanoprost 0.005% ophthalmic solution: 1 drop(s) to each affected eye once a day (in the evening)  meclizine 25 mg oral tablet: orally every 8 hours, As Needed  metoprolol tartrate 25 mg oral tablet: 1 tab(s) orally once a day  Norvasc 5 mg oral tablet: 1 tab(s) orally once a day (at bedtime)  Pataday 0.2% ophthalmic solution: 1 drop(s) to each affected eye once a day, As Needed  Percocet 5 mg-325 mg oral tablet: 1 tab(s) orally every 6 hours, As Needed -for severe pain MDD:4   Synthroid 100 mcg (0.1 mg) oral tablet: 1 tab(s) orally once a week 7th day   Synthroid 50 mcg (0.05 mg) oral tablet: 1 tab(s) orally once a day for 6  days   Turmeric 500 mg oral capsule: 2 cap(s) orally once a day  Vitamin D3 25 mcg (1000 intl units) oral tablet: 1 tab(s) orally once a day  ZyrTEC 10 mg oral tablet: 1 tab(s) orally once a day

## 2021-12-01 NOTE — DISCHARGE NOTE PROVIDER - CARE PROVIDERS DIRECT ADDRESSES
[email or web address]

## 2021-12-01 NOTE — DISCHARGE NOTE NURSING/CASE MANAGEMENT/SOCIAL WORK - NSDCPEFALRISK_GEN_ALL_CORE
For information on Fall & Injury Prevention, visit: [email or web address] OR  [email or web address] OR  https://www.cdc.gov/steadi/patient.html

## 2021-12-01 NOTE — DISCHARGE NOTE PROVIDER - HOSPITAL COURSE
65 yo female presented for Parathyroidectomy.  PMHX of JOSE RAUL with use of CPAP at night, HTN.  Pt did well intra op andwas taken to PACU.  Recovered well, and was transferred to the floor for overnight care.  Had runs of bradycardia last night, medicine called to evaluate.  Currently HR normal.  Vitals are stable.  Tolerating diet, ambulating, voiding.  Pt feels well.  Plan for discharge home today with out patient follow up with Dr. Mckeon.

## 2021-12-01 NOTE — PROGRESS NOTE ADULT - SUBJECTIVE AND OBJECTIVE BOX
The patient was interviewed and evaluated.    66y Female    T(C): 36.7 (12-01-21 @ 05:24), Max: 36.8 (11-30-21 @ 20:43)  HR: 68 (12-01-21 @ 08:16) (51 - 68)  BP: 148/68 (12-01-21 @ 05:24) (119/75 - 150/60)  RR: 16 (12-01-21 @ 05:24) (14 - 16)  SpO2: 97% (12-01-21 @ 08:16) (93% - 97%)  Wt(kg): --    No current nausea/vomiting. Was nauseas yesterday postoperatively but feels much better today. Ate full breakfast. No recall, sore throat or headache.    No anesthesia related complaints or sequelae.

## 2021-12-01 NOTE — PROGRESS NOTE ADULT - SUBJECTIVE AND OBJECTIVE BOX
HARIS COHEN  MRN-[id number] 66y    GENERAL SURGERY FOLLOW UP/ DR. GARCIA    Troponin I, High Sensitivity Result: 38.2    SPOKE WITH DR. APODACA CARDIOLOGY EKG, TP REVIEWED , CLEAR BY CARDIO FOR DISCHARGE

## 2021-12-01 NOTE — PROGRESS NOTE ADULT - ASSESSMENT
65 YO Female w/ PMHx of HTN, HLD, JOSE RAUL on CPAP, Hypothyroidism, Migraines and Venous insufficiency s/p parathyroidectomy complicated by bradycardia in 40s.
67 yo female S/P parathyroidectomy 11/30.  Currently doing well.  Calcium level is normal today at 8.6.

## 2021-12-01 NOTE — DISCHARGE NOTE PROVIDER - NSDCCPCAREPLAN_GEN_ALL_CORE_FT
PRINCIPAL DISCHARGE DIAGNOSIS  Diagnosis: S/P parathyroidectomy  Assessment and Plan of Treatment:

## 2021-12-01 NOTE — DISCHARGE NOTE PROVIDER - NSDCFUADDINST_GEN_ALL_CORE_FT
- Leave Steri-strips (tapes) on.  Some blood staining on the tape is normal.    - Okay to shower 48 hours after surgery (Wednesday morning).  Keep showers short.  No baths or soaking the incision.  Remember to gently towel dry over the incision to avoid rubbing off the Steri-strips.    - Start gentle neck exercises after 72 hours (Thursday morning).  Look all the way to the right and left and then let your head roll all the way around.  Do this 10 times in a row at least 3 times a day.    - You may take Tylenol or Percocet for pain.  After 24 hours it is okay to take Ibuprofen.    - Remember that it is expected that your calcium level may be low after parathyroidectomy.  You should take around 500 mg of over-the-counter calcium TWO TIMES DAILY starting tonight.  You may take any supplement that has 500-600 mg elemental calcium per pill.  If you begin experiencing symptoms of low calcium, such as numbness or tingling in your fingertips or around your mouth, immediately take an extra 1000 mg of elemental calcium.    - Please call Dr. Mckeon’s office ([phone]) to schedule a follow-up appointment for either Monday, ([place]) or Thursday, ([place]).

## 2021-12-01 NOTE — CONSULT NOTE ADULT - ASSESSMENT
67 YO Female w/ PMHx of HTN, HLD, JOSE RAUL on CPAP, Hypothyroidism, Migraines and Venous insufficiency s/p parathyroidectomy complicated by bradycardia in 40s.   67 YO Female w/ PMHx of HTN, HLD, JOSE RAUL on CPAP, Hypothyroidism, Migraines and Venous insufficiency s/p parathyroidectomy complicated by bradycardia in 40s.      -bradycardia  S/p HR in the high 40s early post-op.  Possible causes include JOSE RAUL, hypercalcemia (mild), and on metoprolol.   Metoprolol has been discontinued.    HR has since been in normal range.    Recommend monitor on tele until late afternoon today, x24 hours from time of bradycardia.    -abnormal EKG  EKG from yesterday had flat T, somewhat new from old EKG.  Check trops.  Repeat EKG today.    If HR remains stable, trop neg and EKG stable, then can discharge late afternoon.  67 YO Female w/ PMHx of HTN, HLD, JOSE RAUL on CPAP, Hypothyroidism, Migraines and Venous insufficiency s/p parathyroidectomy complicated by bradycardia in 40s.      -bradycardia  S/p HR in the high 40s early post-op.  Possible causes include JOSE RAUL, hypercalcemia (mild), and on metoprolol.   Metoprolol has been discontinued.    HR has since been in normal range.    Recommend monitor on tele until late afternoon today, x24 hours from time of bradycardia.    -abnormal EKG  EKG from yesterday had flat T, somewhat new from old EKG.  Check trops.  Repeat EKG today.    If HR remains stable, trop neg and EKG stable, then can discharge late afternoon.     Addendum  EKG from this afternoon reviewed.  It is unchanged from her previous EKG.  Troponin level is negative.  There is no further bradycardia.  Stable from cardiac standpoint for discharge.

## 2021-12-01 NOTE — DISCHARGE NOTE PROVIDER - CARE PROVIDER_API CALL
Mitul Mckeon  General Surgery  [address] [place] [address]  Phone: [phone]  Fax: [phone]  Follow Up Time:

## 2021-12-01 NOTE — PROGRESS NOTE ADULT - PROBLEM SELECTOR PLAN 1
Advance diet to DASH- will evaluate.   Possible discharge to home today  Calcium levels normal  Will send RX for pain medication to pharmacy.  Instructions for home care, will be given on discharge paperwork.  Will discuss with Dr. Mckeon.
- F/up AM labs  - Advance to red diet in AM if tolerating  - Med recs appreciated- metoprolol held.   - Cont remote tele  - Zofran PRN for nausea  - pain control, supportive care

## 2021-12-01 NOTE — CONSULT NOTE ADULT - SUBJECTIVE AND OBJECTIVE BOX
CARDIOLOGY CONSULT NOTE    Patient is a 66y Female with a known history of :  S/P parathyroidectomy [E89.2]      HPI:      REVIEW OF SYSTEMS:    CONSTITUTIONAL: No fever, weight loss, or fatigue  EYES: No eye pain, visual disturbances, or discharge  ENMT:  No difficulty hearing, tinnitus, vertigo; No sinus or throat pain  NECK: No pain or stiffness  BREASTS: No pain, masses, or nipple discharge  RESPIRATORY: No cough, wheezing, chills or hemoptysis; No shortness of breath  CARDIOVASCULAR: No chest pain, palpitations, dizziness, or leg swelling  GASTROINTESTINAL: No abdominal or epigastric pain. No nausea, vomiting, or hematemesis; No diarrhea or constipation. No melena or hematochezia.  GENITOURINARY: No dysuria, frequency, hematuria, or incontinence  NEUROLOGICAL: No headaches, memory loss, loss of strength, numbness, or tremors  SKIN: No itching, burning, rashes, or lesions   LYMPH NODES: No enlarged glands  ENDOCRINE: No heat or cold intolerance; No hair loss  MUSCULOSKELETAL: No joint pain or swelling; No muscle, back, or extremity pain  PSYCHIATRIC: No depression, anxiety, mood swings, or difficulty sleeping  HEME/LYMPH: No easy bruising, or bleeding gums  ALLERGY AND IMMUNOLOGIC: No hives or eczema    MEDICATIONS  (STANDING):  amLODIPine   Tablet 5 milliGRAM(s) Oral at bedtime  calcium carbonate 1250 mG  + Vitamin D (OsCal 500 + D) 1 Tablet(s) Oral two times a day  furosemide    Tablet 20 milliGRAM(s) Oral daily  lactated ringers. 1000 milliLiter(s) (100 mL/Hr) IV Continuous <Continuous>  levothyroxine 25 MICROGram(s) Oral daily  losartan 100 milliGRAM(s) Oral daily    MEDICATIONS  (PRN):  acetaminophen     Tablet .. 650 milliGRAM(s) Oral every 6 hours PRN Mild Pain (1 - 3)  ondansetron Injectable 4 milliGRAM(s) IV Push every 6 hours PRN Nausea  oxyCODONE    IR 5 milliGRAM(s) Oral every 4 hours PRN Moderate Pain (4 - 6)  oxyCODONE    IR 10 milliGRAM(s) Oral every 6 hours PRN Severe Pain (7 - 10)      ALLERGIES: adhesives (Rash)  Cardizem (Rash)  Fish Products (Rash)  Flonase (Swelling)  Levaquin (Rash)  lobster (Rash)      FAMILY HISTORY:  Family history of early CAD  CABG father    Family history of colon cancer in father  prostate cancer , HTN    FH: HTN (hypertension)  TIA; Mother    Family history of breast cancer in sister  HTN    Family history of pheochromocytoma  sister    FH: HTN (hypertension)  sisters        PHYSICAL EXAMINATION:  -----------------------------  T(C): 36.7 (12-01-21 @ 05:24), Max: 36.8 (11-30-21 @ 20:43)  HR: 67 (12-01-21 @ 05:26) (45 - 68)  BP: 148/68 (12-01-21 @ 05:24) (119/75 - 162/62)  RR: 16 (12-01-21 @ 05:24) (14 - 16)  SpO2: 97% (12-01-21 @ 05:26) (93% - 98%)  Wt(kg): --        Constitutional: well developed, normal appearance, well groomed, well nourished, no deformities and no acute distress.   Eyes: the conjunctiva exhibited no abnormalities and the eyelids demonstrated no xanthelasmas.   HEENT: normal oral mucosa, no oral pallor and no oral cyanosis.   Neck: normal jugular venous A waves present, normal jugular venous V waves present and no jugular venous interiano A waves.   Pulmonary: no respiratory distress, normal respiratory rhythm and effort, no accessory muscle use and lungs were clear to auscultation bilaterally.   Cardiovascular: heart rate and rhythm were normal, normal S1 and S2 and no murmur, gallop, rub, heave or thrill are present.   Abdomen: soft, non-tender, no hepato-splenomegaly and no abdominal mass palpated.   Musculoskeletal: the gait could not be assessed..   Extremities: no clubbing of the fingernails, no localized cyanosis, no petechial hemorrhages and no ischemic changes.   Skin: normal skin color and pigmentation, no rash, no venous stasis, no skin lesions, no skin ulcer and no xanthoma was observed.   Psychiatric: oriented to person, place, and time, the affect was normal, the mood was normal and not feeling anxious.     LABS:   --------                     RADIOLOGY:  -----------------        ECG:  CARDIOLOGY CONSULT NOTE    Patient is a 66y Female with a known history of :  S/P parathyroidectomy [E89.2]      HPI:   67 yo morbidly obese female with HTN, HLD, JOSE RAUL on CPAP, Hypothyroidism, Migraines and Venous insufficiency POD 0 for parathyroidectomy with PTH assay. Consulted for bradycardia to the 40's.    REVIEW OF SYSTEMS:    CONSTITUTIONAL: No fever, weight loss, or fatigue  EYES: No eye pain, visual disturbances, or discharge  ENMT:  No difficulty hearing, tinnitus, vertigo; No sinus or throat pain  NECK: No pain or stiffness  BREASTS: No pain, masses, or nipple discharge  RESPIRATORY: No cough, wheezing, chills or hemoptysis; No shortness of breath  CARDIOVASCULAR: No chest pain, palpitations, dizziness, or leg swelling  GASTROINTESTINAL: No abdominal or epigastric pain. No nausea, vomiting, or hematemesis; No diarrhea or constipation. No melena or hematochezia.  GENITOURINARY: No dysuria, frequency, hematuria, or incontinence  NEUROLOGICAL: No headaches, memory loss, loss of strength, numbness, or tremors  SKIN: No itching, burning, rashes, or lesions   LYMPH NODES: No enlarged glands  ENDOCRINE: No heat or cold intolerance; No hair loss  MUSCULOSKELETAL: No joint pain or swelling; No muscle, back, or extremity pain  PSYCHIATRIC: No depression, anxiety, mood swings, or difficulty sleeping  HEME/LYMPH: No easy bruising, or bleeding gums  ALLERGY AND IMMUNOLOGIC: No hives or eczema    MEDICATIONS  (STANDING):  amLODIPine   Tablet 5 milliGRAM(s) Oral at bedtime  calcium carbonate 1250 mG  + Vitamin D (OsCal 500 + D) 1 Tablet(s) Oral two times a day  furosemide    Tablet 20 milliGRAM(s) Oral daily  lactated ringers. 1000 milliLiter(s) (100 mL/Hr) IV Continuous <Continuous>  levothyroxine 25 MICROGram(s) Oral daily  losartan 100 milliGRAM(s) Oral daily    MEDICATIONS  (PRN):  acetaminophen     Tablet .. 650 milliGRAM(s) Oral every 6 hours PRN Mild Pain (1 - 3)  ondansetron Injectable 4 milliGRAM(s) IV Push every 6 hours PRN Nausea  oxyCODONE    IR 5 milliGRAM(s) Oral every 4 hours PRN Moderate Pain (4 - 6)  oxyCODONE    IR 10 milliGRAM(s) Oral every 6 hours PRN Severe Pain (7 - 10)      ALLERGIES: adhesives (Rash)  Cardizem (Rash)  Fish Products (Rash)  Flonase (Swelling)  Levaquin (Rash)  lobster (Rash)      FAMILY HISTORY:  Family history of early CAD  CABG father    Family history of colon cancer in father  prostate cancer , HTN    FH: HTN (hypertension)  TIA; Mother    Family history of breast cancer in sister  HTN    Family history of pheochromocytoma  sister    FH: HTN (hypertension)  sisters        PHYSICAL EXAMINATION:  -----------------------------  T(C): 36.7 (12-01-21 @ 05:24), Max: 36.8 (11-30-21 @ 20:43)  HR: 67 (12-01-21 @ 05:26) (45 - 68)  BP: 148/68 (12-01-21 @ 05:24) (119/75 - 162/62)  RR: 16 (12-01-21 @ 05:24) (14 - 16)  SpO2: 97% (12-01-21 @ 05:26) (93% - 98%)  Wt(kg): --        Constitutional: well developed, normal appearance, well groomed, well nourished, no deformities and no acute distress.   Eyes: the conjunctiva exhibited no abnormalities and the eyelids demonstrated no xanthelasmas.   HEENT: normal oral mucosa, no oral pallor and no oral cyanosis.   Neck: normal jugular venous A waves present, normal jugular venous V waves present and no jugular venous interiano A waves.   Pulmonary: no respiratory distress, normal respiratory rhythm and effort, no accessory muscle use and lungs were clear to auscultation bilaterally.   Cardiovascular: heart rate and rhythm were normal, normal S1 and S2 and no murmur, gallop, rub, heave or thrill are present.   Abdomen: soft, non-tender, no hepato-splenomegaly and no abdominal mass palpated.   Musculoskeletal: the gait could not be assessed..   Extremities: no clubbing of the fingernails, no localized cyanosis, no petechial hemorrhages and no ischemic changes.   Skin: normal skin color and pigmentation, no rash, no venous stasis, no skin lesions, no skin ulcer and no xanthoma was observed.   Psychiatric: oriented to person, place, and time, the affect was normal, the mood was normal and not feeling anxious.     LABS:   --------                     RADIOLOGY:  -----------------        ECG:

## 2021-12-01 NOTE — DISCHARGE NOTE NURSING/CASE MANAGEMENT/SOCIAL WORK - PATIENT PORTAL LINK FT
You can access the FollowMyHealth Patient Portal offered by [facility] by registering at the following website: [email or web address]. By joining [facility]’s FollowMyHealth portal, you will also be able to view your health information using other applications (apps) compatible with our system.

## 2021-12-01 NOTE — DISCHARGE NOTE PROVIDER - NSDCCPTREATMENT_GEN_ALL_CORE_FT
PRINCIPAL PROCEDURE  Procedure: Parathyroidectomy with nerve integrity monitoring  Findings and Treatment: right.

## 2021-12-03 RX ORDER — CEPHALEXIN 500 MG/1
500 CAPSULE ORAL 4 TIMES DAILY
Qty: 28 | Refills: 0 | Status: ACTIVE | COMMUNITY
Start: 2021-12-03 | End: 1900-01-01

## 2021-12-06 ENCOUNTER — APPOINTMENT (OUTPATIENT)
Dept: SURGERY | Facility: CLINIC | Age: 66
End: 2021-12-06
Payer: COMMERCIAL

## 2021-12-06 DIAGNOSIS — Z09 ENCOUNTER FOR FOLLOW-UP EXAMINATION AFTER COMPLETED TREATMENT FOR CONDITIONS OTHER THAN MALIGNANT NEOPLASM: ICD-10-CM

## 2021-12-06 DIAGNOSIS — Z01.818 ENCOUNTER FOR OTHER PREPROCEDURAL EXAMINATION: ICD-10-CM

## 2021-12-06 PROCEDURE — 99024 POSTOP FOLLOW-UP VISIT: CPT

## 2021-12-06 NOTE — PHYSICAL EXAM
[Midline] : located in midline position [Normal] : orientation to person, place, and time: normal [FreeTextEntry1] : obese [de-identified] : right-sided scar as above [de-identified] : Extremities: STAHL x 4, however ambulates with a cane.   Skin: No obvious skin lesions.   Voice: slightly raspy and at baseline

## 2022-04-28 ENCOUNTER — NON-APPOINTMENT (OUTPATIENT)
Age: 67
End: 2022-04-28

## 2022-06-01 ENCOUNTER — NON-APPOINTMENT (OUTPATIENT)
Age: 67
End: 2022-06-01

## 2022-06-02 ENCOUNTER — LABORATORY RESULT (OUTPATIENT)
Age: 67
End: 2022-06-02

## 2022-06-02 ENCOUNTER — NON-APPOINTMENT (OUTPATIENT)
Age: 67
End: 2022-06-02

## 2022-06-13 ENCOUNTER — APPOINTMENT (OUTPATIENT)
Dept: SURGERY | Facility: CLINIC | Age: 67
End: 2022-06-13
Payer: COMMERCIAL

## 2022-06-13 DIAGNOSIS — R93.89 ABNORMAL FINDINGS ON DIAGNOSTIC IMAGING OF OTHER SPECIFIED BODY STRUCTURES: ICD-10-CM

## 2022-06-13 DIAGNOSIS — E06.3 AUTOIMMUNE THYROIDITIS: ICD-10-CM

## 2022-06-13 DIAGNOSIS — Z86.39 PERSONAL HISTORY OF OTHER ENDOCRINE, NUTRITIONAL AND METABOLIC DISEASE: ICD-10-CM

## 2022-06-13 PROCEDURE — 99212 OFFICE O/P EST SF 10 MIN: CPT

## 2022-06-13 NOTE — PHYSICAL EXAM
[Midline] : located in midline position [Normal] : orientation to person, place, and time: normal [FreeTextEntry1] : obese [de-identified] : right-sided scar as above [de-identified] : Extremities: STAHL x 4, however ambulates with a cane.   Skin: No obvious skin lesions.   Voice: clear

## 2022-07-01 NOTE — CONSULT NOTE ADULT - PROBLEM SELECTOR PROBLEM 1
Morbid obesity with BMI of 50.0-59.9, adult
I have personally seen and examined the patient. I have collaborated with and supervised the

## 2022-07-20 ENCOUNTER — APPOINTMENT (OUTPATIENT)
Dept: ORTHOPEDIC SURGERY | Facility: CLINIC | Age: 67
End: 2022-07-20

## 2022-07-20 VITALS
SYSTOLIC BLOOD PRESSURE: 166 MMHG | WEIGHT: 280 LBS | HEIGHT: 62 IN | BODY MASS INDEX: 51.53 KG/M2 | HEART RATE: 75 BPM | DIASTOLIC BLOOD PRESSURE: 67 MMHG

## 2022-07-20 PROCEDURE — 72110 X-RAY EXAM L-2 SPINE 4/>VWS: CPT

## 2022-07-20 PROCEDURE — 99214 OFFICE O/P EST MOD 30 MIN: CPT

## 2022-07-20 PROCEDURE — 72170 X-RAY EXAM OF PELVIS: CPT

## 2022-07-20 RX ORDER — GABAPENTIN 100 MG/1
100 CAPSULE ORAL
Qty: 30 | Refills: 2 | Status: ACTIVE | COMMUNITY
Start: 2022-07-20 | End: 1900-01-01

## 2022-07-20 RX ORDER — COVID-19 ANTIGEN TEST
KIT MISCELLANEOUS
Qty: 8 | Refills: 0 | Status: ACTIVE | COMMUNITY
Start: 2022-06-07

## 2022-07-20 RX ORDER — SPIRONOLACTONE 25 MG/1
25 TABLET ORAL
Qty: 180 | Refills: 0 | Status: ACTIVE | COMMUNITY
Start: 2022-03-10

## 2022-07-20 NOTE — DISCUSSION/SUMMARY
[Medication Risks Reviewed] : Medication risks reviewed [de-identified] : Patient presents with symptoms consistent with lumbar radiculopathy in the setting of unstable spondylolisthesis lumbar spine L4-5 and disc degeneration L5-S1.  Recommend MRI lumbar spine for further evaluation of her condition.  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  Prescribed her gabapentin on a trial basis.  Further treatment options can be discussed after the MRI and may include lumbar epidural steroid injection of the surgical intervention with laminectomy and fusion from L4-S1.  Weight loss program will also be discussed with her.

## 2022-07-20 NOTE — PHYSICAL EXAM
[Cane] : ambulates with cane [Obese] : obese [Antalgic] : antalgic [LE] : 5/5 motor strength in bilateral lower extremities [] : Sensory: [L5-Bilat] : L5 [0] : left ankle jerk 0 [DP] : dorsalis pedis 2+ and symmetric bilaterally [SLR] : negative straight leg raise [Plantar Reflex Right Only] : absent on the right [Plantar Reflex Left Only] : absent on the left [DTR Reflexes Clonus Of Right Ankle (___ Beats)] : absent on the right [DTR Reflexes Clonus Of Left Ankle (___ Beats)] : absent on the left [de-identified] : The pt is awake, alert and oriented to self, place and time, is comfortable and in no acute distress. Inspection of neck, back and lower extremities bilaterally reveals no rashes or ecchymotic lesions.  There is no obvious abnormal spinal curvature in the sagittal and coronal planes. There is no tenderness over the cervical, thoracic or lumbar spine, or the paraspinal or upper and lower extremities musculature. There is no sacroiliac tenderness. No greater trochanteric tenderness bilaterally. No atrophy or abnormal movements noted in the upper or lower extremities. There is no swelling noted in the upper or lower extremities bilaterally. No cervical lymphadenopathy noted anteriorly. No joint laxity noted in the upper and lower extremity joints bilaterally.\par Hip range of motion is degrees internal rotation 30° external rotation without pain. Full range of motion of the shoulders bilaterally with no significant pain\par There is no groin pain with hip internal rotation and a negative KIMBERLY test bilaterally.  [de-identified] : 4 views lumbar spine demonstrate no significant scoliosis.  Normal lumbar lordosis.  Significant disc degeneration loss of disc height in place post anterior osteophytes at L5-S1.  Grade 1 spinal listhesis at L4-5.  In flexion worsens to 8 mm in extension improves to 5 mm suggesting instability at that level.  Loss of disc at L3-4.  Trace calcification abdominal aorta.\par \par AP pelvis demonstrates bilaterally partially visualized bilateral hip arthroplasty.  No obvious acute fractures.

## 2022-07-20 NOTE — HISTORY OF PRESENT ILLNESS
[Walking] : walking [Sitting] : sitting [Standing] : standing [Daily] : ~He/She~ states the symptoms seem to be occuring daily [de-identified] : Patient is here today for evaluation on her low back bilateral leg pain right worse than left 4th toe both feet numb going on for several years. Patient saw Orthopedist in 2019 went for physical therapy for her hips and spine. Pain varies from 3-9.\par LBP for years, seen by orthopaedist in 2018, feb 20 L THR and may 20 R THR \par Last year in 2021, right lateral thigh and high pain\par Numbness of 4th toe boot feet, pad under both feet.\par venous insufficiency both legs\par Hx of parathyroidectomy in 11/2021 - incidental finding of tarlow cyst in thoracic  [de-identified] : motrin

## 2022-07-30 ENCOUNTER — APPOINTMENT (OUTPATIENT)
Dept: MRI IMAGING | Facility: CLINIC | Age: 67
End: 2022-07-30

## 2022-07-30 ENCOUNTER — OUTPATIENT (OUTPATIENT)
Dept: OUTPATIENT SERVICES | Facility: HOSPITAL | Age: 67
LOS: 1 days | End: 2022-07-30
Payer: COMMERCIAL

## 2022-07-30 DIAGNOSIS — Z90.710 ACQUIRED ABSENCE OF BOTH CERVIX AND UTERUS: Chronic | ICD-10-CM

## 2022-07-30 DIAGNOSIS — D24.2 BENIGN NEOPLASM OF LEFT BREAST: Chronic | ICD-10-CM

## 2022-07-30 DIAGNOSIS — Z96.642 PRESENCE OF LEFT ARTIFICIAL HIP JOINT: Chronic | ICD-10-CM

## 2022-07-30 DIAGNOSIS — S86.119A STRAIN OF OTHER MUSCLE(S) AND TENDON(S) OF POSTERIOR MUSCLE GROUP AT LOWER LEG LEVEL, UNSPECIFIED LEG, INITIAL ENCOUNTER: Chronic | ICD-10-CM

## 2022-07-30 DIAGNOSIS — Z98.890 OTHER SPECIFIED POSTPROCEDURAL STATES: Chronic | ICD-10-CM

## 2022-07-30 DIAGNOSIS — Z96.641 PRESENCE OF RIGHT ARTIFICIAL HIP JOINT: Chronic | ICD-10-CM

## 2022-07-30 DIAGNOSIS — G96.191 PERINEURAL CYST: ICD-10-CM

## 2022-07-30 DIAGNOSIS — K11.6 MUCOCELE OF SALIVARY GLAND: Chronic | ICD-10-CM

## 2022-07-30 PROCEDURE — 72148 MRI LUMBAR SPINE W/O DYE: CPT

## 2022-07-30 PROCEDURE — 72148 MRI LUMBAR SPINE W/O DYE: CPT | Mod: 26

## 2022-07-30 PROCEDURE — 72146 MRI CHEST SPINE W/O DYE: CPT | Mod: 26

## 2022-07-30 PROCEDURE — 72146 MRI CHEST SPINE W/O DYE: CPT

## 2022-09-07 ENCOUNTER — NON-APPOINTMENT (OUTPATIENT)
Age: 67
End: 2022-09-07

## 2022-09-07 ENCOUNTER — APPOINTMENT (OUTPATIENT)
Dept: ORTHOPEDIC SURGERY | Facility: CLINIC | Age: 67
End: 2022-09-07

## 2022-09-07 VITALS
WEIGHT: 280 LBS | SYSTOLIC BLOOD PRESSURE: 152 MMHG | BODY MASS INDEX: 51.53 KG/M2 | DIASTOLIC BLOOD PRESSURE: 71 MMHG | HEART RATE: 62 BPM | HEIGHT: 62 IN

## 2022-09-07 PROCEDURE — 99214 OFFICE O/P EST MOD 30 MIN: CPT

## 2022-09-07 NOTE — REASON FOR VISIT
[Follow-Up Visit] : a follow-up visit for [Degenerative Joint Disease] : degenerative joint disease [Back Pain] : back pain [Radiculopathy] : radiculopathy [Spinal Stenosis] : spinal stenosis [Spondylolistheses] : spondylolistheses [FreeTextEntry2] : Tarlov cyst

## 2022-09-07 NOTE — PHYSICAL EXAM
[Antalgic] : antalgic [Cane] : ambulates with cane [LE] : 5/5 motor strength in bilateral lower extremities [] : Sensory: [L5-Bilat] : L5 [0] : left ankle jerk 0 [DP] : dorsalis pedis 2+ and symmetric bilaterally [Obese] : obese [SLR] : negative straight leg raise [Plantar Reflex Right Only] : absent on the right [Plantar Reflex Left Only] : absent on the left [DTR Reflexes Clonus Of Right Ankle (___ Beats)] : absent on the right [DTR Reflexes Clonus Of Left Ankle (___ Beats)] : absent on the left [de-identified] : The pt is awake, alert and oriented to self, place and time, is comfortable and in no acute distress. Inspection of neck, back and lower extremities bilaterally reveals no rashes or ecchymotic lesions.  There is no obvious abnormal spinal curvature in the sagittal and coronal planes. There is no tenderness over the cervical, thoracic or lumbar spine, or the paraspinal or upper and lower extremities musculature. There is no sacroiliac tenderness. No greater trochanteric tenderness bilaterally. No atrophy or abnormal movements noted in the upper or lower extremities. There is no swelling noted in the upper or lower extremities bilaterally. No cervical lymphadenopathy noted anteriorly. No joint laxity noted in the upper and lower extremity joints bilaterally.\par Hip range of motion is degrees internal rotation 30° external rotation without pain. Full range of motion of the shoulders bilaterally with no significant pain\par There is no groin pain with hip internal rotation and a negative KIMBERLY test bilaterally.  [de-identified] : EXAM: [id number] - MR SPINE LUMBAR - ORDERED BY: SARAN DIXON\par \par PROCEDURE DATE: 07/30/2022\par \par INTERPRETATION: LUMBAR SPINE MRI\par \par CLINICAL INFORMATION: Lumbar radiculopathy.\par \par TECHNIQUE: Multiplanar, multisequence MRI was obtained of the lumbar spine.\par \par COMPARISON: None available.\par \par FINDINGS:\par \par OSSEOUS: Degenerative grade 1 anterolisthesis at L4-L5 without pars interarticularis defects. Modic predominantly type II endplate changes at T12-L1 and L5-S1. Benign lipid rich intraosseous hemangioma within the vertebral body of L2. Vertebral bodies are normal in signal, stature, and alignment without fracture or aggressive neoplasm.\par \par LEVEL BY LEVEL ANALYSIS:\par \par L1-L2: No significant abnormality.\par \par L2-L3: No significant abnormality.\par \par L3-L4: No significant abnormality.\par \par L4-L5: Posterior disc pseudobulge and moderate to severe left worse than right facet arthrosis with 0.7 cm synovial cyst/ganglion along the deep margin on the left side projecting into the neural canal contributing to at least moderate focal left-sided neural canal stenosis with abutment and mild cranial displacement of the exiting ipsilateral L4 nerve root (series 6, image 35; series 2, image 12). Mild spinal canal stenosis. Right neural canal is adequate.\par \par L5-S1: Right posterior paracentral/foraminal predominant disc bulging contributes to mild spinal canal stenosis with partial effacement of the right lateral recess and abutment of descending ipsilateral S1 nerve root. Bilateral neural canals are adequate.\par \par GENERAL: Conus medullaris tip is anatomic in positioning. No intradural or extradural lesion.\par \par IMPRESSION:\par 1. At L4-L5 moderate to severe left worse than right facet arthrosis with subcentimeter synovial cyst along the deep margin on the left side projecting into the neural canal and contributing to at least moderate focal left-sided neural canal stenosis with abutment and mild displacement of exiting ipsilateral L4 nerve root. Correlate for possible left-sided L4 radiculopathy.\par 2. Lower grade and chronic findings at additional levels as detailed in the body section of this report.\par \par --- End of Report ---\par \par AUGUST RONDON MD; Attending Radiologist\par This document has been electronically signed. Aug 5 2022 1:36PM\par \par ___________\par \par \par EXAM: [id number] - MR SPINE THORACIC - ORDERED BY: SARAN DIXON\par \par \par PROCEDURE DATE: 07/30/2022\par \par \par \par INTERPRETATION: THORACIC SPINE MRI\par \par CLINICAL INFORMATION: Back pain for evaluation of Tarlov cyst.\par \par COMPARISON: None available.\par \par TECHNIQUE: Multiplanar, multisequence MRI was obtained of the thoracic spine.\par \par FINDINGS:\par \par OSSEOUS: Vertebral bodies are normal in signal, stature, and alignment without fracture, spondylolisthesis, or aggressive neoplasm.\par \par Multiple scattered nerve root sheath cysts, largest on the left side at T1-T2 measuring 1.4 x 1.7 cm in maximal axial and longitudinal dimensions (series 7, image 17; series 8, image 5). Spinal canal and bilateral neural canals are adequate.\par \par GENERAL: No intradural or extradural lesion.\par \par IMPRESSION:\par Multiple scattered thoracic nerve root sheath cysts, largest on the left side at T1-T2.\par \par --- End of Report ---\par \par AUGUST RONDON MD; Attending Radiologist\par This document has been electronically signed. Aug 5 2022 1:52PM\par

## 2022-09-07 NOTE — HISTORY OF PRESENT ILLNESS
[Stable] : stable [5] : a current pain level of 5/10 [Walking] : walking [Sitting] : sitting [Standing] : standing [Daily] : ~He/She~ states the symptoms seem to be occuring daily [Bending] : worsened by bending [Rest] : relieved by rest [de-identified] : Patient is here today to review mri's thoracic and lumbar spine 7/30/22. No changes in symptoms.\par Primary LBP with standing. Toes are numb on occasion. Occasional buttock pain and leg pain right more than left\par Was given gabapentin  but did not take it yet [de-identified] : motrin cane

## 2022-09-07 NOTE — DISCUSSION/SUMMARY
[Medication Risks Reviewed] : Medication risks reviewed [4 Weeks] : in 4 weeks [de-identified] : Patient has symptoms of back pain with symptoms radiating down into the foot with some numbness of toes.  She has significant disc degeneration L5-S1 disc protrusion and also spondylolisthesis at L4-5 with a synovial cyst on the left side at L4-5.  We discussed various treatment options first including surgical intervention which she is not interested at the moment.  She would like to try noninvasive treatments including no injections.  For now prescribed her diclofenac and physical therapy.  She may use the cane as needed.  If her symptoms persist over the next month we can reassess possibility of lumbar epidural steroid injections bilaterally at the L5 on the right and L4 on the left.\par \par The patient also has a significant nerve root sheath cyst in the thoracic spine and a referral to a neurosurgeon was provided for further evaluation management.\par \par Recommendation was made for her to see a nutritionist professionally for consideration of weight loss program.  Water-based exercise was also strongly recommended for her.\par \par MRI performed previously of the thoracic and lumbar spine was independently reviewed by me and findings discussed with the patient.\par

## 2022-10-15 NOTE — OCCUPATIONAL THERAPY INITIAL EVALUATION ADULT - BED MOBILITY LIMITATIONS, REHAB EVAL
1 attempt were made to reach patient, which have been unsuccessful. Unable to leave voicemail on 10/15.
1 attempt was made to reach the patient, which has been unsuccessful. Unable to leave voicemail on 10/14.
decreased ability to use legs for bridging/pushing

## 2022-11-21 NOTE — DISCHARGE NOTE PROVIDER - NSDCHHHOMEBOUND_GEN_ALL_CORE
PT OBSERVED RESTING IN BED, NO SIGNS OF RESPIRATORY DISTRESS, CALM, COOPERATIVE, MONITORING 
BP AND TEMPERATURE. LAST TEMP 100.1 TYLENOL GIVEN. MEDICATION EFFECTIVE. PT VOIDED, ADA DIET 
ORDERED. PT PAIN 0/10. BED AT LOWEST LOCKED POSITION. PT ENCOURAGED TO USE CALL LIGHT. 
CONTINUE TO MONITOR PT.
Detail Level: Detailed
117.9
Fall risk/Pain greater than 7 on scale of 10 on ambulation

## 2022-12-05 ENCOUNTER — APPOINTMENT (OUTPATIENT)
Dept: NEUROSURGERY | Facility: CLINIC | Age: 67
End: 2022-12-05

## 2022-12-05 DIAGNOSIS — G96.191 PERINEURAL CYST: ICD-10-CM

## 2022-12-05 PROCEDURE — 99203 OFFICE O/P NEW LOW 30 MIN: CPT

## 2022-12-07 PROBLEM — G96.191 TARLOV CYST: Status: ACTIVE | Noted: 2022-07-20

## 2022-12-11 NOTE — HISTORY OF PRESENT ILLNESS
[FreeTextEntry1] : back pain, tarlov cysts  [de-identified] : Patient is a 67 year old female with PMH of HTN, arthritis, hypothyroidism, steopenia, lower back pain with occasional buttock,  bilateral leg pain R>L, right foot pain as well as R groin pain, numbness on the lateral aspects of thighs since 2018. Presents today for neurosurgical evaluation of Tarlov cysts. Patient has been following up with orthopedic doctor. Her symptoms occur while walking, sitting and standing and seem to be occurring daily. Symptoms worsen by bending and relieved with rest and Motrin. No weakness, bladder or bowel dysfunction. Recent MRI thoracic and lumbar spine on 7/30/22 significant disc degeneration, L5-S1 disc protrusion and also spondylolisthesis at L4-5 with a synovial cyst on the left side at L4-5 contributing to L sided neuroforaminal stenosis. Multiple thoracic nerve sheath cysts, largest at L T1-2 level. \par \par \par \par \par \par

## 2022-12-11 NOTE — ASSESSMENT
[FreeTextEntry1] : 68 y/o F presents for neurosurgical evaluation of Tarlov cysts. Patient with history of lower back pain with occasional buttock,  bilateral leg pain R>L, right foot pain as well as R groin pain, numbness on the lateral aspects of thighs since 2018.  Her symptoms occur while walking, sitting and standing and seem to be occurring daily. Symptoms worsen by bending and relieved with rest and Motrin. No weakness, bladder or bowel dysfunction. \par Recent MRI thoracic and lumbar spine on 7/30/22 with significant disc degeneration, L5-S1 disc protrusion and also spondylolisthesis at L4-5 with a synovial cyst on the left side at L4-5 contributing to L sided neuroforaminal stenosis. Multiple thoracic Tarlov cysts, largest at L T1-2 level. \par \par No significant findings on MRI requiring surgical intervention. Patient reassured that her back pain is most likely secondary to facet arthropathy. She can continue conservative therapy including gentle PT, core exercises to build back muscles,  NSAIDs as needed. She had x-ray flex/ex and planing on faxing us the report. Advised on importance of losing weight as well. Will consider referring to pain management if she does not improve. \par \par \par \par \par

## 2022-12-11 NOTE — ADDENDUM
[FreeTextEntry1] : I have personally seen , performed an exam, and reviewed her imaging on PACS and spine model. The documentation reflects my findings, and plan of care. \par \par I have spent 40 minutes in this encounter.\par \par Reynold Bhatti MD

## 2022-12-11 NOTE — PHYSICAL EXAM
[General Appearance - Alert] : alert [General Appearance - In No Acute Distress] : in no acute distress [Oriented To Time, Place, And Person] : oriented to person, place, and time [Person] : oriented to person [Place] : oriented to place [Time] : oriented to time [Cranial Nerves Optic (II)] : visual acuity intact bilaterally,  pupils equal round and reactive to light [Cranial Nerves Oculomotor (III)] : extraocular motion intact [Cranial Nerves Trigeminal (V)] : facial sensation intact symmetrically [Cranial Nerves Facial (VII)] : face symmetrical [Cranial Nerves Vestibulocochlear (VIII)] : hearing was intact bilaterally [Cranial Nerves Glossopharyngeal (IX)] : tongue and palate midline [Cranial Nerves Accessory (XI - Cranial And Spinal)] : head turning and shoulder shrug symmetric [Cranial Nerves Hypoglossal (XII)] : there was no tongue deviation with protrusion [Motor Tone] : muscle tone was normal in all four extremities [Motor Strength] : muscle strength was normal in all four extremities [Sensation Tactile Decrease] : light touch was intact [Balance] : balance was intact [0] : Ankle jerk left 0 [Sclera] : the sclera and conjunctiva were normal [PERRL With Normal Accommodation] : pupils were equal in size, round, reactive to light, with normal accommodation [Outer Ear] : the ears and nose were normal in appearance [Hearing Threshold Finger Rub Not [place]] : hearing was normal [Neck Appearance] : the appearance of the neck was normal [] : no respiratory distress [Exaggerated Use Of Accessory Muscles For Inspiration] : no accessory muscle use [Abnormal Walk] : normal gait [Skin Color & Pigmentation] : normal skin color and pigmentation [Straight-Leg Raise Test - Left] : straight leg raise of the left leg was negative [Straight-Leg Raise Test - Right] : straight leg raise  of the right leg was negative

## 2023-01-09 ENCOUNTER — APPOINTMENT (OUTPATIENT)
Dept: ORTHOPEDIC SURGERY | Facility: CLINIC | Age: 68
End: 2023-01-09
Payer: COMMERCIAL

## 2023-01-09 PROCEDURE — 99214 OFFICE O/P EST MOD 30 MIN: CPT

## 2023-01-09 NOTE — PHYSICAL EXAM
[Antalgic] : antalgic [Cane] : ambulates with cane [LE] : 5/5 motor strength in bilateral lower extremities [] : Sensory: [L5-Bilat] : L5 [0] : left ankle jerk 0 [DP] : dorsalis pedis 2+ and symmetric bilaterally [Obese] : obese [SLR] : negative straight leg raise [Plantar Reflex Right Only] : absent on the right [Plantar Reflex Left Only] : absent on the left [DTR Reflexes Clonus Of Right Ankle (___ Beats)] : absent on the right [DTR Reflexes Clonus Of Left Ankle (___ Beats)] : absent on the left [de-identified] : The pt is awake, alert and oriented to self, place and time, is comfortable and in no acute distress. Inspection of neck, back and lower extremities bilaterally reveals no rashes or ecchymotic lesions.  There is no obvious abnormal spinal curvature in the sagittal and coronal planes. There is no tenderness over the cervical, thoracic or lumbar spine, or the paraspinal or upper and lower extremities musculature. There is no sacroiliac tenderness. No greater trochanteric tenderness bilaterally. No atrophy or abnormal movements noted in the upper or lower extremities. There is no swelling noted in the upper or lower extremities bilaterally. No cervical lymphadenopathy noted anteriorly. No joint laxity noted in the upper and lower extremity joints bilaterally.\par Hip range of motion is degrees internal rotation 30° external rotation without pain. Full range of motion of the shoulders bilaterally with no significant pain\par There is no groin pain with hip internal rotation and a negative KIMBERLY test bilaterally.  [de-identified] : EXAM: [id number] - MR SPINE LUMBAR - ORDERED BY: SARAN DIXON\par \par PROCEDURE DATE: 07/30/2022\par \par INTERPRETATION: LUMBAR SPINE MRI\par \par CLINICAL INFORMATION: Lumbar radiculopathy.\par \par TECHNIQUE: Multiplanar, multisequence MRI was obtained of the lumbar spine.\par \par COMPARISON: None available.\par \par FINDINGS:\par \par OSSEOUS: Degenerative grade 1 anterolisthesis at L4-L5 without pars interarticularis defects. Modic predominantly type II endplate changes at T12-L1 and L5-S1. Benign lipid rich intraosseous hemangioma within the vertebral body of L2. Vertebral bodies are normal in signal, stature, and alignment without fracture or aggressive neoplasm.\par \par LEVEL BY LEVEL ANALYSIS:\par \par L1-L2: No significant abnormality.\par \par L2-L3: No significant abnormality.\par \par L3-L4: No significant abnormality.\par \par L4-L5: Posterior disc pseudobulge and moderate to severe left worse than right facet arthrosis with 0.7 cm synovial cyst/ganglion along the deep margin on the left side projecting into the neural canal contributing to at least moderate focal left-sided neural canal stenosis with abutment and mild cranial displacement of the exiting ipsilateral L4 nerve root (series 6, image 35; series 2, image 12). Mild spinal canal stenosis. Right neural canal is adequate.\par \par L5-S1: Right posterior paracentral/foraminal predominant disc bulging contributes to mild spinal canal stenosis with partial effacement of the right lateral recess and abutment of descending ipsilateral S1 nerve root. Bilateral neural canals are adequate.\par \par GENERAL: Conus medullaris tip is anatomic in positioning. No intradural or extradural lesion.\par \par IMPRESSION:\par 1. At L4-L5 moderate to severe left worse than right facet arthrosis with subcentimeter synovial cyst along the deep margin on the left side projecting into the neural canal and contributing to at least moderate focal left-sided neural canal stenosis with abutment and mild displacement of exiting ipsilateral L4 nerve root. Correlate for possible left-sided L4 radiculopathy.\par 2. Lower grade and chronic findings at additional levels as detailed in the body section of this report.\par \par --- End of Report ---\par \par AUGUST RONDON MD; Attending Radiologist\par This document has been electronically signed. Aug 5 2022 1:36PM\par \par ___________\par \par \par EXAM: [id number] - MR SPINE THORACIC - ORDERED BY: SARAN DIXON\par \par \par PROCEDURE DATE: 07/30/2022\par \par \par \par INTERPRETATION: THORACIC SPINE MRI\par \par CLINICAL INFORMATION: Back pain for evaluation of Tarlov cyst.\par \par COMPARISON: None available.\par \par TECHNIQUE: Multiplanar, multisequence MRI was obtained of the thoracic spine.\par \par FINDINGS:\par \par OSSEOUS: Vertebral bodies are normal in signal, stature, and alignment without fracture, spondylolisthesis, or aggressive neoplasm.\par \par Multiple scattered nerve root sheath cysts, largest on the left side at T1-T2 measuring 1.4 x 1.7 cm in maximal axial and longitudinal dimensions (series 7, image 17; series 8, image 5). Spinal canal and bilateral neural canals are adequate.\par \par GENERAL: No intradural or extradural lesion.\par \par IMPRESSION:\par Multiple scattered thoracic nerve root sheath cysts, largest on the left side at T1-T2.\par \par --- End of Report ---\par \par AUGUST RONDON MD; Attending Radiologist\par This document has been electronically signed. Aug 5 2022 1:52PM\par

## 2023-01-09 NOTE — DISCUSSION/SUMMARY
[Medication Risks Reviewed] : Medication risks reviewed [Other: ____] : in [unfilled] [de-identified] : The patient has been seen by neurosurgeon for evaluation of the Tarlov cyst and will be followed symptomatically.  No intervention is indicated.  The patient.\par \par With regard to the spondylolisthesis and disc degeneration as well as spinal stenosis from L4-S1 the patient would like to avoid surgery for now. Would benefit eventually from L4-S1 decompression and fusion.\par \par Prescription for PT provided, Gabapentin 100 mg for refilled by her PCP. Does not want to take diclofenac due to her HTN. Takes tylenol which she can continue on as-needed basis.  She can discontinue that medication is not helpful.  Neck step would be to consider bilateral L4 transforaminal epidural steroid injections as well and she will call the office to schedule.\par \par The patient was educated regarding their condition, treatment options as well as prescribed course of treatment. \par Risks and benefits as well as alternatives to the proposed treatment were also provided to the patient \par They were given the opportunity to have all their questions answered to their satisfaction.\par \par Vital signs were reviewed with the patient and the patient was instructed to followup with their primary care provider for further management. There were no PAs or scribes used in the evaluation, exam or treatment plan discussion. The surgeon was the primary evaluating or treating physician as noted above.

## 2023-01-09 NOTE — HISTORY OF PRESENT ILLNESS
[de-identified] : Patient is here for back pain, spinal stenosis and spondylolisthesis.\par Patient notes that pain level is 5/10 and increased with walking and prolonged sitting/standing, which she is taking diclofenac sodium and gabapentin, which has been helping\par She notes that she went to the neurosurgeon for the Tarlov cyst and they are going to monitor for right now.\par Patient has been going to physical therapy since the beginning of Dec for 2x week and it has been helping. \par \par Patient notes that she is having radiation of pain down both leg mostly in the right leg.\par  Numbness and tingling in bilateral 1st and  4th toes and pads of feet.

## 2023-01-18 ENCOUNTER — TRANSCRIPTION ENCOUNTER (OUTPATIENT)
Age: 68
End: 2023-01-18

## 2023-01-18 ENCOUNTER — OUTPATIENT (OUTPATIENT)
Dept: OUTPATIENT SERVICES | Facility: HOSPITAL | Age: 68
LOS: 1 days | End: 2023-01-18
Payer: COMMERCIAL

## 2023-01-18 DIAGNOSIS — Z98.890 OTHER SPECIFIED POSTPROCEDURAL STATES: Chronic | ICD-10-CM

## 2023-01-18 DIAGNOSIS — S86.119A STRAIN OF OTHER MUSCLE(S) AND TENDON(S) OF POSTERIOR MUSCLE GROUP AT LOWER LEG LEVEL, UNSPECIFIED LEG, INITIAL ENCOUNTER: Chronic | ICD-10-CM

## 2023-01-18 DIAGNOSIS — Z20.828 CONTACT WITH AND (SUSPECTED) EXPOSURE TO OTHER VIRAL COMMUNICABLE DISEASES: ICD-10-CM

## 2023-01-18 DIAGNOSIS — Z90.710 ACQUIRED ABSENCE OF BOTH CERVIX AND UTERUS: Chronic | ICD-10-CM

## 2023-01-18 DIAGNOSIS — Z96.641 PRESENCE OF RIGHT ARTIFICIAL HIP JOINT: Chronic | ICD-10-CM

## 2023-01-18 DIAGNOSIS — K11.6 MUCOCELE OF SALIVARY GLAND: Chronic | ICD-10-CM

## 2023-01-18 DIAGNOSIS — D24.2 BENIGN NEOPLASM OF LEFT BREAST: Chronic | ICD-10-CM

## 2023-01-18 DIAGNOSIS — Z96.642 PRESENCE OF LEFT ARTIFICIAL HIP JOINT: Chronic | ICD-10-CM

## 2023-01-18 LAB — SARS-COV-2 RNA SPEC QL NAA+PROBE: SIGNIFICANT CHANGE UP

## 2023-01-18 PROCEDURE — U0003: CPT

## 2023-01-18 PROCEDURE — U0005: CPT

## 2023-01-18 NOTE — ASU DISCHARGE PLAN (ADULT/PEDIATRIC) - NS MD DC FALL RISK RISK
For information on Fall & Injury Prevention, visit: [email or web address] OR  [email or web address] OR  https://www.cdc.gov/steadi/patient.html

## 2023-01-18 NOTE — ASU PATIENT PROFILE, ADULT - NSICDXPASTMEDICALHX_GEN_ALL_CORE_FT
PAST MEDICAL HISTORY:  Allergic rhinitis     Chronic venous insufficiency     GERD (gastroesophageal reflux disease)     Glaucoma     Hashimoto's thyroiditis     HTN (hypertension)     Hyperparathyroidism being monitored by endocrinologist    Migraines     Morbid obesity with BMI of 50.0-59.9, adult BMI 51.5    JOSE RAUL (obstructive sleep apnea) On CPAP    Osteoarthritis of right hip     Primary hyperthyroidism     Raynauds disease     Rosacea     Vertigo      PAST MEDICAL HISTORY:  Allergic rhinitis     Chronic venous insufficiency     GERD (gastroesophageal reflux disease)     Glaucoma     Hashimoto's thyroiditis     HTN (hypertension)     Hyperparathyroidism being monitored by endocrinologist    Migraines     Morbid obesity with BMI of 50.0-59.9, adult BMI 51.5    JOSE RAUL (obstructive sleep apnea) On CPAP    Osteoarthritis of right hip left hip, hands    Primary hyperthyroidism     Raynauds disease     Rosacea     Vertigo

## 2023-01-18 NOTE — ASU PATIENT PROFILE, ADULT - FALL HARM RISK - HARM RISK INTERVENTIONS

## 2023-01-19 ENCOUNTER — APPOINTMENT (OUTPATIENT)
Dept: ORTHOPEDIC SURGERY | Facility: HOSPITAL | Age: 68
End: 2023-01-19

## 2023-01-19 VITALS
DIASTOLIC BLOOD PRESSURE: 72 MMHG | RESPIRATION RATE: 18 BRPM | TEMPERATURE: 98 F | HEIGHT: 62.5 IN | SYSTOLIC BLOOD PRESSURE: 157 MMHG | OXYGEN SATURATION: 98 % | HEART RATE: 66 BPM | WEIGHT: 279.99 LBS

## 2023-01-19 NOTE — H&P PST ADULT - HISTORY OF PRESENT ILLNESS
here for low back injection for low back pain radiating down right legt here for low back injection for low back pain radiating down both legs with occasional tingling and toe numbness

## 2023-01-20 ENCOUNTER — OUTPATIENT (OUTPATIENT)
Dept: OUTPATIENT SERVICES | Facility: HOSPITAL | Age: 68
LOS: 1 days | Discharge: HOME | End: 2023-01-20
Payer: COMMERCIAL

## 2023-01-20 VITALS
DIASTOLIC BLOOD PRESSURE: 86 MMHG | OXYGEN SATURATION: 94 % | HEART RATE: 66 BPM | RESPIRATION RATE: 17 BRPM | SYSTOLIC BLOOD PRESSURE: 166 MMHG

## 2023-01-20 DIAGNOSIS — Z98.890 OTHER SPECIFIED POSTPROCEDURAL STATES: Chronic | ICD-10-CM

## 2023-01-20 DIAGNOSIS — Z90.710 ACQUIRED ABSENCE OF BOTH CERVIX AND UTERUS: Chronic | ICD-10-CM

## 2023-01-20 DIAGNOSIS — Z96.642 PRESENCE OF LEFT ARTIFICIAL HIP JOINT: Chronic | ICD-10-CM

## 2023-01-20 DIAGNOSIS — K11.6 MUCOCELE OF SALIVARY GLAND: Chronic | ICD-10-CM

## 2023-01-20 DIAGNOSIS — S86.119A STRAIN OF OTHER MUSCLE(S) AND TENDON(S) OF POSTERIOR MUSCLE GROUP AT LOWER LEG LEVEL, UNSPECIFIED LEG, INITIAL ENCOUNTER: Chronic | ICD-10-CM

## 2023-01-20 DIAGNOSIS — M54.16 RADICULOPATHY, LUMBAR REGION: ICD-10-CM

## 2023-01-20 DIAGNOSIS — M43.10 SPONDYLOLISTHESIS, SITE UNSPECIFIED: ICD-10-CM

## 2023-01-20 DIAGNOSIS — Z87.39 PERSONAL HISTORY OF OTHER DISEASES OF THE MUSCULOSKELETAL SYSTEM AND CONNECTIVE TISSUE: ICD-10-CM

## 2023-01-20 DIAGNOSIS — Z96.641 PRESENCE OF RIGHT ARTIFICIAL HIP JOINT: Chronic | ICD-10-CM

## 2023-01-20 DIAGNOSIS — M48.062 SPINAL STENOSIS, LUMBAR REGION WITH NEUROGENIC CLAUDICATION: ICD-10-CM

## 2023-01-20 DIAGNOSIS — M51.37 OTHER INTERVERTEBRAL DISC DEGENERATION, LUMBOSACRAL REGION: ICD-10-CM

## 2023-01-20 DIAGNOSIS — D24.2 BENIGN NEOPLASM OF LEFT BREAST: Chronic | ICD-10-CM

## 2023-01-20 PROCEDURE — 64483 NJX AA&/STRD TFRM EPI L/S 1: CPT | Mod: 50

## 2023-01-20 RX ORDER — FUROSEMIDE 40 MG
1 TABLET ORAL
Qty: 0 | Refills: 0 | DISCHARGE

## 2023-01-20 RX ORDER — AMLODIPINE BESYLATE 2.5 MG/1
1 TABLET ORAL
Qty: 0 | Refills: 0 | DISCHARGE

## 2023-01-26 PROBLEM — M16.11 UNILATERAL PRIMARY OSTEOARTHRITIS, RIGHT HIP: Chronic | Status: ACTIVE | Noted: 2020-03-12

## 2023-02-08 ENCOUNTER — APPOINTMENT (OUTPATIENT)
Dept: ORTHOPEDIC SURGERY | Facility: CLINIC | Age: 68
End: 2023-02-08
Payer: COMMERCIAL

## 2023-02-08 VITALS
DIASTOLIC BLOOD PRESSURE: 74 MMHG | SYSTOLIC BLOOD PRESSURE: 152 MMHG | BODY MASS INDEX: 51.53 KG/M2 | WEIGHT: 280 LBS | HEIGHT: 62 IN | HEART RATE: 62 BPM

## 2023-02-08 PROCEDURE — 99214 OFFICE O/P EST MOD 30 MIN: CPT

## 2023-02-08 NOTE — PHYSICAL EXAM
[Antalgic] : antalgic [Cane] : ambulates with cane [LE] : 5/5 motor strength in bilateral lower extremities [] : Sensory: [L5-Bilat] : L5 [0] : left ankle jerk 0 [DP] : dorsalis pedis 2+ and symmetric bilaterally [Obese] : obese [SLR] : negative straight leg raise [Plantar Reflex Right Only] : absent on the right [Plantar Reflex Left Only] : absent on the left [DTR Reflexes Clonus Of Right Ankle (___ Beats)] : absent on the right [DTR Reflexes Clonus Of Left Ankle (___ Beats)] : absent on the left [de-identified] : The pt is awake, alert and oriented to self, place and time, is comfortable and in no acute distress. Inspection of neck, back and lower extremities bilaterally reveals no rashes or ecchymotic lesions.  There is no obvious abnormal spinal curvature in the sagittal and coronal planes. There is no tenderness over the cervical, thoracic or lumbar spine, or the paraspinal or upper and lower extremities musculature. There is no sacroiliac tenderness. No greater trochanteric tenderness bilaterally. No atrophy or abnormal movements noted in the upper or lower extremities. There is no swelling noted in the upper or lower extremities bilaterally. No cervical lymphadenopathy noted anteriorly. No joint laxity noted in the upper and lower extremity joints bilaterally.\par Hip range of motion is degrees internal rotation 30° external rotation without pain. Full range of motion of the shoulders bilaterally with no significant pain\par There is no groin pain with hip internal rotation and a negative KIMBERLY test bilaterally.  [de-identified] : EXAM: [id number] - MR SPINE LUMBAR - ORDERED BY: SARAN DIXON\par \par PROCEDURE DATE: 07/30/2022\par \par INTERPRETATION: LUMBAR SPINE MRI\par \par CLINICAL INFORMATION: Lumbar radiculopathy.\par \par TECHNIQUE: Multiplanar, multisequence MRI was obtained of the lumbar spine.\par \par COMPARISON: None available.\par \par FINDINGS:\par \par OSSEOUS: Degenerative grade 1 anterolisthesis at L4-L5 without pars interarticularis defects. Modic predominantly type II endplate changes at T12-L1 and L5-S1. Benign lipid rich intraosseous hemangioma within the vertebral body of L2. Vertebral bodies are normal in signal, stature, and alignment without fracture or aggressive neoplasm.\par \par LEVEL BY LEVEL ANALYSIS:\par \par L1-L2: No significant abnormality.\par \par L2-L3: No significant abnormality.\par \par L3-L4: No significant abnormality.\par \par L4-L5: Posterior disc pseudobulge and moderate to severe left worse than right facet arthrosis with 0.7 cm synovial cyst/ganglion along the deep margin on the left side projecting into the neural canal contributing to at least moderate focal left-sided neural canal stenosis with abutment and mild cranial displacement of the exiting ipsilateral L4 nerve root (series 6, image 35; series 2, image 12). Mild spinal canal stenosis. Right neural canal is adequate.\par \par L5-S1: Right posterior paracentral/foraminal predominant disc bulging contributes to mild spinal canal stenosis with partial effacement of the right lateral recess and abutment of descending ipsilateral S1 nerve root. Bilateral neural canals are adequate.\par \par GENERAL: Conus medullaris tip is anatomic in positioning. No intradural or extradural lesion.\par \par IMPRESSION:\par 1. At L4-L5 moderate to severe left worse than right facet arthrosis with subcentimeter synovial cyst along the deep margin on the left side projecting into the neural canal and contributing to at least moderate focal left-sided neural canal stenosis with abutment and mild displacement of exiting ipsilateral L4 nerve root. Correlate for possible left-sided L4 radiculopathy.\par 2. Lower grade and chronic findings at additional levels as detailed in the body section of this report.\par \par --- End of Report ---\par \par \par AUGUST RONDON MD; Attending Radiologist\par This document has been electronically signed. Aug 5 2022 1:36PM

## 2023-02-08 NOTE — DISCUSSION/SUMMARY
[Medication Risks Reviewed] : Medication risks reviewed [de-identified] : MRI lumbar spine performed previously was again independently reviewed by me and findings discussed with patient.  She has synovial cyst noted at L4-5 with grade 1 spinal listhesis at the L4-5 level.  She also has significant disc degeneration at L5-S1.  We talked about the option of additional bilateral L4 transforaminal epidural steroid injections on an as-needed basis and she will follow-up in 3 months for reevaluation.\par \par In the meantime prescribed her a referral to nutritionist was provided along with prescription of physical therapy.  Increase the gabapentin to 300 mg nightly which she can wean back down to 100 or 200 mg nightly if she has significant side effects of sedation or confusion.  We discussed the option of additional injection in the future but over the long-term understands that she may need a laminectomy and fusion from L4-S1 with transforaminal lumbar interbody fusion L4-5 and posterior instrumentation L4-S1 with laminectomy at the L4-5 level.\par \par The patient was educated regarding their condition, treatment options as well as prescribed course of treatment. \par Risks and benefits as well as alternatives to the proposed treatment were also provided to the patient \par They were given the opportunity to have all their questions answered to their satisfaction.\par \par Vital signs were reviewed with the patient and the patient was instructed to followup with their primary care provider for further management. There were no PAs or scribes used in the evaluation, exam or treatment plan discussion. The surgeon was the primary evaluating or treating physician as noted above.

## 2023-02-08 NOTE — HISTORY OF PRESENT ILLNESS
[Stable] : stable [4] : a current pain level of 4/10 [Daily] : ~He/She~ states the symptoms seem to be occuring daily [Prolonged Sitting] : worsened by prolonged sitting [Prolonged Standing] : worsened by prolonged standing [Acetaminophen] : relieved by acetaminophen [de-identified] : Patient is 3 weeks post lumbar epidural injection bilateral L4. Patient states 50% less back pain and 75% less nerve pain since injection.\par  [de-identified] : prolong walking [de-identified] : lumbar epidural injection gabapentin motrin and cane to ambulate

## 2023-02-22 NOTE — PROGRESS NOTE ADULT - SUBJECTIVE AND OBJECTIVE BOX
Postoperative Day #: 1    66y Female admitted with Primary hyperparathyroidism  S/P Parathyroidectomy       Patient seen and examined bedside resting comfortably.  No complaints offered.   States she feels well. Currently tolerating Clears.  Ambulating, voiding.  Had bradycardia overnight, currently normal heart rate . Afebrile    T(F): 98.1 (12-01-21 @ 05:24), Max: 98.3 (11-30-21 @ 20:43)  HR: 68 (12-01-21 @ 08:16) (45 - 68)  BP: 148/68 (12-01-21 @ 05:24) (119/75 - 162/62)  RR: 16 (12-01-21 @ 05:24) (14 - 16)  SpO2: 97% (12-01-21 @ 08:16) (93% - 98%)  Wt(kg): --  CAPILLARY BLOOD GLUCOSE          PHYSICAL EXAM:  General: NAD  Neuro:  Alert & oriented x 3  Neck: Steri strips in place.  No erythema, ecchymosis,   CV: +S1+S2 regular rate and rhythm  Lung: clear to ausculation bilaterally  Extremities: no pedal edema or calf tenderness noted       LABS:                        10.6   9.81  )-----------( 285      ( 01 Dec 2021 08:15 )             32.1     12-01    142  |  109<H>  |  13  ----------------------------<  94  3.8   |  25  |  0.60    Ca    8.6      01 Dec 2021 08:15  Phos  3.6     12-01  Mg     2.0     12-01    TPro  5.8<L>  /  Alb  2.5<L>  /  TBili  0.4  /  DBili  x   /  AST  13<L>  /  ALT  25  /  AlkPhos  80  12-01      I&O's Detail        RADIOLOGY:     Yes...

## 2023-04-10 ENCOUNTER — APPOINTMENT (OUTPATIENT)
Dept: ORTHOPEDIC SURGERY | Facility: CLINIC | Age: 68
End: 2023-04-10
Payer: COMMERCIAL

## 2023-04-10 VITALS
SYSTOLIC BLOOD PRESSURE: 184 MMHG | WEIGHT: 280 LBS | DIASTOLIC BLOOD PRESSURE: 78 MMHG | BODY MASS INDEX: 51.53 KG/M2 | HEIGHT: 62 IN | HEART RATE: 56 BPM

## 2023-04-10 PROCEDURE — 99214 OFFICE O/P EST MOD 30 MIN: CPT

## 2023-04-10 NOTE — PHYSICAL EXAM
[Antalgic] : antalgic [Cane] : ambulates with cane [LE] : 5/5 motor strength in bilateral lower extremities [] : Sensory: [L5-Bilat] : L5 [0] : left ankle jerk 0 [DP] : dorsalis pedis 2+ and symmetric bilaterally [Obese] : obese [SLR] : negative straight leg raise [Plantar Reflex Right Only] : absent on the right [Plantar Reflex Left Only] : absent on the left [DTR Reflexes Clonus Of Right Ankle (___ Beats)] : absent on the right [DTR Reflexes Clonus Of Left Ankle (___ Beats)] : absent on the left [de-identified] : The pt is awake, alert and oriented to self, place and time, is comfortable and in no acute distress. Inspection of neck, back and lower extremities bilaterally reveals no rashes or ecchymotic lesions.  There is no obvious abnormal spinal curvature in the sagittal and coronal planes. There is no tenderness over the cervical, thoracic or lumbar spine, or the paraspinal or upper and lower extremities musculature. There is no sacroiliac tenderness. No greater trochanteric tenderness bilaterally. No atrophy or abnormal movements noted in the upper or lower extremities. There is no swelling noted in the upper or lower extremities bilaterally. No cervical lymphadenopathy noted anteriorly. No joint laxity noted in the upper and lower extremity joints bilaterally.\par Hip range of motion is degrees internal rotation 30° external rotation without pain. Full range of motion of the shoulders bilaterally with no significant pain\par There is no groin pain with hip internal rotation and a negative KIMBERLY test bilaterally.

## 2023-04-10 NOTE — DISCUSSION/SUMMARY
[Medication Risks Reviewed] : Medication risks reviewed [de-identified] : Patient appears to have an acute on chronic exacerbation of underlying spinal stenosis at L4-5 with disc degeneration at L4-5 and L5-S1 with lumbar radiculopathy.\par PT is helping, can continue and a new prescription was provided.\par Increase gabapentin to 300 mg q12 since it is helping her leg symptoms but given her increased symptoms we will try and increase the gabapentin to twice daily.\par May consider another bilateral L4 [place], will call the office if she would like to proceed with it.\par Over long term may need TLIF at L4-5, PSF with instrumentation L4-S1 with laminectomy L4-5, will followup with another visit to discuss the proposed surgery.  Encouraged her to follow-up with a family member to discuss the surgical option in the anticipated recovery.  In the meantime recommended weight loss program for her and strongly encouraged physical therapy exercises.  She is anticipating [facility] in July and is contemplating having surgery either prior to [facility] or afterwards.  She is undecided yet.  \par \par She will need an updated MRI lumbar spine prior to surgery if she chooses to have surgery of the lumbar spine.  July 2023.\par \par The patient was educated regarding their condition, treatment options as well as prescribed course of treatment. \par Risks and benefits as well as alternatives to the proposed treatment were also provided to the patient \par They were given the opportunity to have all their questions answered to their satisfaction.\par \par Vital signs were reviewed with the patient and the patient was instructed to followup with their primary care provider for further management. There were no PAs or scribes used in the evaluation, exam or treatment plan discussion. The surgeon was the primary evaluating or treating physician as noted above.\par

## 2023-04-10 NOTE — HISTORY OF PRESENT ILLNESS
[5] : a current pain level of 5/10 [Daily] : ~He/She~ states the symptoms seem to be occuring daily [Prolonged Sitting] : worsened by prolonged sitting [Prolonged Standing] : worsened by prolonged standing [Rest] : relieved by rest [Worsening] : worsening [___ mths] : [unfilled] month(s) ago [de-identified] : Patient is here today for re evaluation on her chronic low back leg pain. Patient been going for physical therapy 2 times a week states good days and bad days.\par Pain primarily across right lower back\par Also has tingling right leg more than left leg to the heel.Worsening over time.\par Painw as bearable, numbness of toes bothered her more over the weekend after being physically active. \par Works as a dietician, hoping to retire in 7/2023 [de-identified] : prolong walking, am pain [de-identified] : motrin and gabapentin

## 2023-05-08 ENCOUNTER — APPOINTMENT (OUTPATIENT)
Dept: ORTHOPEDIC SURGERY | Facility: CLINIC | Age: 68
End: 2023-05-08

## 2023-05-10 ENCOUNTER — APPOINTMENT (OUTPATIENT)
Dept: ORTHOPEDIC SURGERY | Facility: CLINIC | Age: 68
End: 2023-05-10
Payer: COMMERCIAL

## 2023-05-10 VITALS
DIASTOLIC BLOOD PRESSURE: 79 MMHG | SYSTOLIC BLOOD PRESSURE: 153 MMHG | BODY MASS INDEX: 51.53 KG/M2 | HEART RATE: 63 BPM | WEIGHT: 280 LBS | HEIGHT: 62 IN

## 2023-05-10 DIAGNOSIS — M48.062 SPINAL STENOSIS, LUMBAR REGION WITH NEUROGENIC CLAUDICATION: ICD-10-CM

## 2023-05-10 PROCEDURE — 99214 OFFICE O/P EST MOD 30 MIN: CPT | Mod: 57

## 2023-05-10 RX ORDER — DICLOFENAC SODIUM 50 MG/1
50 TABLET, DELAYED RELEASE ORAL
Qty: 30 | Refills: 0 | Status: DISCONTINUED | COMMUNITY
Start: 2022-09-07 | End: 2023-05-10

## 2023-05-10 NOTE — HISTORY OF PRESENT ILLNESS
[7] : a current pain level of 7/10 [Walking] : walking [Daily] : ~He/She~ states the symptoms seem to be occuring daily [Prolonged Standing] : worsened by prolonged standing [de-identified] : Patient is here today to discuss surgery. Patient has decided that unable to stand for too long unable to walk without a cane. Patient is going for physical therapy.\par In on mounjaro [Worsening] : worsening [___ mths] : [unfilled] month(s) ago [de-identified] : sitting to standing [de-identified] : cane gabapentin and motrin

## 2023-05-10 NOTE — PHYSICAL EXAM
[Antalgic] : antalgic [Cane] : ambulates with cane [LE] : 5/5 motor strength in bilateral lower extremities [] : Sensory: [L5-Bilat] : L5 [0] : left ankle jerk 0 [DP] : dorsalis pedis 2+ and symmetric bilaterally [Obese] : obese [SLR] : negative straight leg raise [Plantar Reflex Right Only] : absent on the right [Plantar Reflex Left Only] : absent on the left [DTR Reflexes Clonus Of Right Ankle (___ Beats)] : absent on the right [DTR Reflexes Clonus Of Left Ankle (___ Beats)] : absent on the left [de-identified] : Seen with her son\par The pt is awake, alert and oriented to self, place and time, is comfortable and in no acute distress. Inspection of neck, back and lower extremities bilaterally reveals no rashes or ecchymotic lesions.  There is no obvious abnormal spinal curvature in the sagittal and coronal planes. There is no tenderness over the cervical, thoracic or lumbar spine, or the paraspinal or upper and lower extremities musculature. There is no sacroiliac tenderness. No greater trochanteric tenderness bilaterally. No atrophy or abnormal movements noted in the upper or lower extremities. There is no swelling noted in the upper or lower extremities bilaterally. No cervical lymphadenopathy noted anteriorly. No joint laxity noted in the upper and lower extremity joints bilaterally.\par Hip range of motion is degrees internal rotation 30° external rotation without pain. Full range of motion of the shoulders bilaterally with no significant pain\par There is no groin pain with hip internal rotation and a negative KIMBERLY test bilaterally.  [de-identified] : \par EXAM: [id number] - MR SPINE LUMBAR - ORDERED BY: SARAN DIXON\par \par \par PROCEDURE DATE: 07/30/2022\par \par \par \par INTERPRETATION: LUMBAR SPINE MRI\par \par CLINICAL INFORMATION: Lumbar radiculopathy.\par \par TECHNIQUE: Multiplanar, multisequence MRI was obtained of the lumbar spine.\par \par COMPARISON: None available.\par \par FINDINGS:\par \par OSSEOUS: Degenerative grade 1 anterolisthesis at L4-L5 without pars interarticularis defects. Modic predominantly type II endplate changes at T12-L1 and L5-S1. Benign lipid rich intraosseous hemangioma within the vertebral body of L2. Vertebral bodies are normal in signal, stature, and alignment without fracture or aggressive neoplasm.\par \par LEVEL BY LEVEL ANALYSIS:\par \par L1-L2: No significant abnormality.\par \par L2-L3: No significant abnormality.\par \par L3-L4: No significant abnormality.\par \par L4-L5: Posterior disc pseudobulge and moderate to severe left worse than right facet arthrosis with 0.7 cm synovial cyst/ganglion along the deep margin on the left side projecting into the neural canal contributing to at least moderate focal left-sided neural canal stenosis with abutment and mild cranial displacement of the exiting ipsilateral L4 nerve root (series 6, image 35; series 2, image 12). Mild spinal canal stenosis. Right neural canal is adequate.\par \par L5-S1: Right posterior paracentral/foraminal predominant disc bulging contributes to mild spinal canal stenosis with partial effacement of the right lateral recess and abutment of descending ipsilateral S1 nerve root. Bilateral neural canals are adequate.\par \par GENERAL: Conus medullaris tip is anatomic in positioning. No intradural or extradural lesion.\par \par IMPRESSION:\par 1. At L4-L5 moderate to severe left worse than right facet arthrosis with subcentimeter synovial cyst along the deep margin on the left side projecting into the neural canal and contributing to at least moderate focal left-sided neural canal stenosis with abutment and mild displacement of exiting ipsilateral L4 nerve root. Correlate for possible left-sided L4 radiculopathy.\par 2. Lower grade and chronic findings at additional levels as detailed in the body section of this report.\par \par --- End of Report ---\par \par \par \par \par \par \par AUGUST RONDON MD; Attending Radiologist\par This document has been electronically signed. Aug 5 2022 1:36PM

## 2023-05-10 NOTE — DISCUSSION/SUMMARY
[Medication Risks Reviewed] : Medication risks reviewed [de-identified] : Patient appears to have an acute on chronic exacerbation of underlying spinal stenosis at L4-5 with disc degeneration at L4-5 and L5-S1 with lumbar radiculopathy.\par The patient feels that her symptoms are progressed to the point where she cannot work very well.  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  She is now on a weight loss program with use of Mounjaro.  She would like to proceed with more definitive treatment of her condition and we will schedule transforaminal lumbar interbody fusion L4-5 with posterior spinal fusion L5-S1 and spinal instrumentation with decompression from L4-S1.\par \par Ordered an updated MRI lumbar spine prior to her surgery.\par \par The patient was advised that based upon their clinical presentation and radiographic findings they meet inclusion criteria for spinal surgery to address their spinal pathology.\par The spectrum of treatments for their spinal condition were reviewed in detail. The goals, alternatives, risks and benefits of spinal surgery were reviewed in detail with the patient.  \par Benefits and risks of operative and nonoperative treatment for the patient's pathology were outlined at length with the patient.  Specifically, those risks are understood to be, but not limited to, bleeding, infection, fracture (both during surgery and after surgery), adjacent level disease, failure to heal (significantly increased by smoking), need for further surgery, failure of instrumentation (if used), recurrence of problem and symptoms, neurovascular injury, dural tears or leaks resulting in spinal fluid leakage and requiring additional invasive and non-invasive treatments, need for additional procedures, possible paralysis resulting in loss of use of arms, legs, bowel and bladder function as well as sexual dysfunction, and anesthetic risks including death. \par Available alternatives to surgery were also discussed with the patient. In addition, the patient further understands that there may be indicated need for somatosensory evoked potential monitoring, real-time EMG monitoring, and motor evoked potential monitoring during the procedure along with placement of needle electrodes. A neuromonitoring service will discuss the risks and benefits separately with the patient.\par The patient also understands that having a surgical procedure and being hospitalized carries risks in addition to the ones described above.\par \par The patient was advised that Dr. Parra operates as a surgical team with his associate Dr. Harley and/or with surgical Physician Assistants (PA)\par \par The patient was advised that they will require a medical preoperative risk evaluation by their PCP. Further medical subspecialty clearances such as cardiac may be indicated if felt needed by their PCP.\par \par The patient was advised he/she may call our office after careful consideration of their treatment options and further consultation with any other physician to begin the process of preoperative planning for elective spinal surgery at a time of their choosing. \par The patient verbalized an understanding of the procedure, its indications, and its common potential complications and attendant risks, and is willing to proceed. No guarantees were made with regard to a complete recovery. We will proceed in a timely manner after obtaining medical clearance.

## 2023-05-10 NOTE — REASON FOR VISIT
[Follow-Up Visit] : a follow-up visit for [Degenerative Joint Disease] : degenerative joint disease [Back Pain] : back pain [Radiculopathy] : radiculopathy [Spinal Stenosis] : spinal stenosis [Family Member] : family member

## 2023-05-16 ENCOUNTER — APPOINTMENT (OUTPATIENT)
Dept: ORTHOPEDIC SURGERY | Facility: CLINIC | Age: 68
End: 2023-05-16
Payer: COMMERCIAL

## 2023-05-16 VITALS
HEIGHT: 62 IN | HEART RATE: 62 BPM | DIASTOLIC BLOOD PRESSURE: 78 MMHG | WEIGHT: 280 LBS | SYSTOLIC BLOOD PRESSURE: 132 MMHG | BODY MASS INDEX: 51.53 KG/M2

## 2023-05-16 DIAGNOSIS — Z96.641 PRESENCE OF RIGHT ARTIFICIAL HIP JOINT: ICD-10-CM

## 2023-05-16 DIAGNOSIS — M25.572 PAIN IN LEFT ANKLE AND JOINTS OF LEFT FOOT: ICD-10-CM

## 2023-05-16 DIAGNOSIS — Z96.642 PRESENCE OF LEFT ARTIFICIAL HIP JOINT: ICD-10-CM

## 2023-05-16 PROCEDURE — 73522 X-RAY EXAM HIPS BI 3-4 VIEWS: CPT

## 2023-05-16 PROCEDURE — 99213 OFFICE O/P EST LOW 20 MIN: CPT

## 2023-05-16 PROCEDURE — 73610 X-RAY EXAM OF ANKLE: CPT | Mod: LT

## 2023-05-17 NOTE — PHYSICAL EXAM
[de-identified] : General/Constitutional: Well appearing, obese 67 year old F in no apparent distress; height and weight as detailed below; vital signs as detailed below\par \par Neuro:\par Orientation/Mental Status: Awake, alert, oriented to time, place, & person.\par Balance: Single leg balance poor on Left / Right @ 5 sec.\par Sensation: intact to fine & deep touch bilat. Feet/toes; \par EHL/AT(Peroneal N.): Bilat: 5/5 \par \par Vascular: DP: Bilat: 2/3 ;\par Cap refill 1-2 sec. all toes\par Lymph: No enlarged LN in the popliteal chain bilaterally.\par Skin(LE): No cellulitis, edema, and min. venous varicosities bilaterally\par  \par MS:\par Gait: The patient has a stable [ short stepped pattern ] w/o antalgic limp using a \par [cane ].\par Function: There is  mod. difficulty mounting the exam table.\par Leg Lengths: On exam the heels reveal [Right long by 1 cm. alignment] in the supine position which is confirmed at the medial malleoli. \par \par Knees: Both knees have no visible swelling, palpable effusion, or joint tenderness. Both have full active and passive flexion and extension on ROM exam without pain . No instability to medial & lateral stresses noted. Quadriceps/hamstring strength was 3-4/5 bilaterally.\par \par Steve sign:  No\par Trendelenberg sign: No\par There is [ no ] rotatory positioning of the pelvis \par The [Neither] hip has no tenderness in the trochanteric bursa or the groin.\par \par Left Hip ROM:\par SLR= 40 deg.; Flexion=  deg.; Extension= 0 deg.; Ext. Rot.= 40 deg.;\par Int. Rot.= 10 deg.; Abduction= 35 deg.; Adduction= 20 deg.\par \par Right  Hip ROM:\par SLR= 40 deg.; Flexion=  deg.; Extension= 0 deg.; Ext. Rot.= 40 deg.;\par Int. Rot.= 10 deg.; Abduction= 35 deg.; Adduction= 20 deg.\par \par Strength: Hip Flexor/Extensor St.= Bilat: 4/5 \par There is no pain on ROM endpoints. \par There is trace stiffness on ROM endpoints.\par Stability: No gross klunk/instability with ROM bilat. Hips.\par Incision/scar:ASI Bilat \par \par LEft Foot;\par Well healed medial heel and ankle scar. Stable ROM foot , ankle. No medial , lateral, anterior , or posterior laxity to stresses. \par Min 1.5 cm areof tenderness over the deltoid ligament take off from medial malleolus and over TN joint. No midfoot instability. Stable integrity to toes.\par  [de-identified] : Radiographs of the pelvis and [Bilateral ] hips and LEft Ankle Xray were performed today. \par There are stable interfaces between bone and implant in the femur and acetabulum. There is stable positioning of the femoral and acetabular components. There is no fracture, foreign body, subsidement, osteolysis, or notable effusion. The lesser trochanters of the Right  femur are aligned on Shenton's line. No heterotopic ossification is noted. LEvo rotatory positiong of the pelvis is noted. \par \par LEft Ankle Xray: Weight Bearing.\par Syable Calcaneal hardware. Stable Tibial talar joint with decreased joint space, no bony impingement in Medial Talar Tibial joint.\par Stable position of  screws in calcaneous. No OSteolysis. No hardware displacement. Grade 3+ - 4 joint space narrowing in the T-N joint. Superior Osteophyes noted. \par

## 2023-05-17 NOTE — END OF VISIT
[FreeTextEntry3] : I saw and evaluated the patient. I discussed and reviewed the case details with the PA and agree with the PA's findings and plan as documented in the PA note.\par \par

## 2023-05-17 NOTE — REASON FOR VISIT
[Follow-Up Visit] : a follow-up visit for [Joint Replacement Surgery, Aftercare] : joint replacement surgery, aftercare [FreeTextEntry2] : S/P Bilateral THR LEft 2/2020 & Right 5/2020; LEft foot pain

## 2023-05-17 NOTE — DISCUSSION/SUMMARY
[de-identified] : Dr. Harris evaluated this patient, reviewed the radiographs, and is guiding the patient's orthopedic management. \par In terms of her hips she has stable jORthopedic hip exams and stable Hip/Pelvis Xrays.\par In terms of her Left  foot she was advised to continue using her support stockingsand supportive shoes.with ADls.\par F/U after LS fusion. IF he foot sX worsen with activity a Steroid injection to T-N joint would be of benefit as next step in management.\par The importance of reduction of overweight/obesity was reviewed from an orthopedic standpoint especially in terms of mechanical load bearing cartilaginous joint surface wear and accelerated implant bearing surface wear.\par I Dr. Harris, personally performed the evaluation and management E/M services for this patient, who presents today with a new problem/exacerbation of an existing condition. That E/M includes conducting the examination, and establishing a new plan of care. Today my ACP  was here to assist my evaluation and management services of this new problem/exacerbated condition to be followed going forward.

## 2023-05-17 NOTE — HISTORY OF PRESENT ILLNESS
[de-identified] : Established patient she presents  today for reevaluation for both her THR hips. she is S/P Bilateral Ant THrs, Left 2/2020 & Right 5/2020. States taking antibiotics for dental prophylaxisShe also has @ 2 months of very mild and intermittent LEft foot pain near TN joint, noted with long periods of standing and walking only, improving after rest. Min local swelling at end of days activity over foot area. No bruising. No hip pain with daily activity. \par Of note had LEft ankle Reconstruction with calcaneal slide and Peroneal tendon recon 2012. Doing well since.\par Currewntly walking with cane. Awaiting LS Fusion Lesly Rogers in 4 weeks for Spondylolisthesis and RLE radiculopathy.\par \par

## 2023-05-31 ENCOUNTER — APPOINTMENT (OUTPATIENT)
Dept: ORTHOPEDIC SURGERY | Facility: CLINIC | Age: 68
End: 2023-05-31
Payer: COMMERCIAL

## 2023-05-31 VITALS — BODY MASS INDEX: 51.53 KG/M2 | WEIGHT: 280 LBS | HEIGHT: 62 IN

## 2023-05-31 DIAGNOSIS — Z85.828 PERSONAL HISTORY OF OTHER MALIGNANT NEOPLASM OF SKIN: ICD-10-CM

## 2023-05-31 PROCEDURE — 99213 OFFICE O/P EST LOW 20 MIN: CPT | Mod: 57

## 2023-06-01 NOTE — DISCUSSION/SUMMARY
[Medication Risks Reviewed] : Medication risks reviewed [de-identified] : Patient appears to have an acute on chronic exacerbation of underlying spinal stenosis at L4-5 with disc degeneration at L4-5 and L5-S1 with lumbar radiculopathy. The patient feels that her symptoms are progressed to the point where she cannot work very well.  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  She is now on a weight loss program with use of Mounjaro.  Her back and leg symptoms are making it difficult for her to precipitate in her job as well as her activities of daily living and an exercise program.  She would like to proceed with more definitive treatment of her condition and we will schedule transforaminal lumbar interbody fusion L4-5 with posterior spinal fusion L5-S1 and spinal instrumentation with decompression from L4-S1. \par \par Waiting to review updated MRI to make sure that we don’t need to do more surgery than scheduled.  However the updated MRI should not change the original treatment plan which is decompression and fusion from L4-S1.  Patient will contact my office once the MRIs been performed to review it and to ensure that there are does not need to be any interval change in surgical plan.\par \par The patient was advised that based upon their clinical presentation and radiographic findings they meet inclusion criteria for spinal surgery to address their spinal pathology. Specifically, they have tried medications, therapy and/or injections without relief of symptoms. Given the duration of symptoms and failure to respond to non surgical treatments surgery was offered to the patient and they have decided to proceed with surgery.\par \par The spectrum of treatments for their spinal condition were reviewed in detail. The goals, alternatives, risks and benefits of spinal surgery were reviewed in detail with the patient.  \par \par Benefits and risks of operative and nonoperative treatment for the patient's pathology were outlined at length with the patient.  Specifically, those risks are understood to be, but not limited to, bleeding, infection, fracture (both during surgery and after surgery), adjacent level disease, failure to heal (significantly increased by smoking), need for further surgery, failure of instrumentation (if used), recurrence of problem and symptoms, neurovascular injury, dural tears or leaks resulting in spinal fluid leakage and requiring additional invasive and non-invasive treatments, need for additional procedures, possible paralysis resulting in loss of use of arms, legs, bowel and bladder function as well as sexual dysfunction, and anesthetic risks including death. \par Available alternatives to surgery were also discussed with the patient. In addition, the patient further understands that there may be indicated need for somatosensory evoked potential monitoring, real-time EMG monitoring, and motor evoked potential monitoring during the procedure along with placement of needle electrodes. A neuromonitoring service will discuss the risks and benefits separately with the patient.\par The patient also understands that having a surgical procedure and being hospitalized carries risks in addition to the ones described above.\par \par The patient was advised that Dr. Parra operates as a surgical team with his associate Dr. Harley and/or with surgical Physician Assistants (PA)\par \par The patient was advised that they will require a medical preoperative risk evaluation by their PCP. Further medical subspecialty clearances such as cardiac may be indicated if felt needed by their PCP.\par \par The patient was advised he/she may call our office after careful consideration of their treatment options and further consultation with any other physician to begin the process of preoperative planning for elective spinal surgery at a time of their choosing. \par The patient verbalized an understanding of the procedure, its indications, and its common potential complications and attendant risks, and is willing to proceed. No guarantees were made with regard to a complete recovery. We will proceed in a timely manner after obtaining medical clearance.

## 2023-06-01 NOTE — HISTORY OF PRESENT ILLNESS
[Worsening] : worsening [8] : a current pain level of 8/10 [Daily] : ~He/She~ states the symptoms seem to be occuring daily [None] : No relieving factors are noted [___ yrs] : [unfilled] year(s) ago [Prolonged Sitting] : worsened by prolonged sitting [Prolonged Standing] : worsened by prolonged standing [Sitting] : worsened by sitting [Standing] : worsened by standing [Walking] : worsened by walking [de-identified] : Patient is here today due to her Insurance [employer] denying her surgery wants to discuss what her options are at this time.\par Primarily right lower back pain tingling pain into buttock radiating to posterior thigh to knee with sitting. Also has tnging down to toes both feet with leg movement\par Gabapentin 300 mg qhs and also in am prn. OTC motrin\par Still in PT. Started PT in 9/2022. Helped a little with core strengthening\par MRI is scheduled for 6/9/23

## 2023-06-01 NOTE — PHYSICAL EXAM
[Antalgic] : antalgic [Cane] : ambulates with cane [LE] : 5/5 motor strength in bilateral lower extremities [] : Sensory: [L5-Bilat] : L5 [0] : left ankle jerk 0 [DP] : dorsalis pedis 2+ and symmetric bilaterally [Obese] : obese [SLR] : negative straight leg raise [Plantar Reflex Right Only] : absent on the right [Plantar Reflex Left Only] : absent on the left [DTR Reflexes Clonus Of Right Ankle (___ Beats)] : absent on the right [DTR Reflexes Clonus Of Left Ankle (___ Beats)] : absent on the left [de-identified] : The pt is awake, alert and oriented to self, place and time, is comfortable and in no acute distress. Inspection of neck, back and lower extremities bilaterally reveals no rashes or ecchymotic lesions.  There is no obvious abnormal spinal curvature in the sagittal and coronal planes. There is no tenderness over the cervical, thoracic or lumbar spine, or the paraspinal or upper and lower extremities musculature. There is no sacroiliac tenderness. No greater trochanteric tenderness bilaterally. No atrophy or abnormal movements noted in the upper or lower extremities. There is no swelling noted in the upper or lower extremities bilaterally. No cervical lymphadenopathy noted anteriorly. No joint laxity noted in the upper and lower extremity joints bilaterally.\par Hip range of motion is degrees internal rotation 30° external rotation without pain. Full range of motion of the shoulders bilaterally with no significant pain\par There is no groin pain with hip internal rotation and a negative KIMBERLY test bilaterally.

## 2023-06-09 ENCOUNTER — OUTPATIENT (OUTPATIENT)
Dept: OUTPATIENT SERVICES | Facility: HOSPITAL | Age: 68
LOS: 1 days | End: 2023-06-09
Payer: COMMERCIAL

## 2023-06-09 ENCOUNTER — APPOINTMENT (OUTPATIENT)
Dept: MRI IMAGING | Facility: CLINIC | Age: 68
End: 2023-06-09
Payer: COMMERCIAL

## 2023-06-09 DIAGNOSIS — Z98.890 OTHER SPECIFIED POSTPROCEDURAL STATES: Chronic | ICD-10-CM

## 2023-06-09 DIAGNOSIS — Z96.641 PRESENCE OF RIGHT ARTIFICIAL HIP JOINT: Chronic | ICD-10-CM

## 2023-06-09 DIAGNOSIS — Z00.8 ENCOUNTER FOR OTHER GENERAL EXAMINATION: ICD-10-CM

## 2023-06-09 DIAGNOSIS — M51.16 INTERVERTEBRAL DISC DISORDERS WITH RADICULOPATHY, LUMBAR REGION: ICD-10-CM

## 2023-06-09 DIAGNOSIS — Z96.642 PRESENCE OF LEFT ARTIFICIAL HIP JOINT: Chronic | ICD-10-CM

## 2023-06-09 DIAGNOSIS — K11.6 MUCOCELE OF SALIVARY GLAND: Chronic | ICD-10-CM

## 2023-06-09 DIAGNOSIS — Z90.710 ACQUIRED ABSENCE OF BOTH CERVIX AND UTERUS: Chronic | ICD-10-CM

## 2023-06-09 DIAGNOSIS — D24.2 BENIGN NEOPLASM OF LEFT BREAST: Chronic | ICD-10-CM

## 2023-06-09 DIAGNOSIS — S86.119A STRAIN OF OTHER MUSCLE(S) AND TENDON(S) OF POSTERIOR MUSCLE GROUP AT LOWER LEG LEVEL, UNSPECIFIED LEG, INITIAL ENCOUNTER: Chronic | ICD-10-CM

## 2023-06-09 DIAGNOSIS — M43.10 SPONDYLOLISTHESIS, SITE UNSPECIFIED: ICD-10-CM

## 2023-06-09 PROCEDURE — 72148 MRI LUMBAR SPINE W/O DYE: CPT | Mod: 26

## 2023-06-09 PROCEDURE — 72148 MRI LUMBAR SPINE W/O DYE: CPT

## 2023-06-12 ENCOUNTER — OUTPATIENT (OUTPATIENT)
Dept: OUTPATIENT SERVICES | Facility: HOSPITAL | Age: 68
LOS: 1 days | End: 2023-06-12
Payer: COMMERCIAL

## 2023-06-12 VITALS
OXYGEN SATURATION: 98 % | HEIGHT: 62 IN | HEART RATE: 89 BPM | DIASTOLIC BLOOD PRESSURE: 96 MMHG | TEMPERATURE: 98 F | WEIGHT: 279.99 LBS | RESPIRATION RATE: 15 BRPM | SYSTOLIC BLOOD PRESSURE: 164 MMHG

## 2023-06-12 DIAGNOSIS — Z98.890 OTHER SPECIFIED POSTPROCEDURAL STATES: Chronic | ICD-10-CM

## 2023-06-12 DIAGNOSIS — S86.119A STRAIN OF OTHER MUSCLE(S) AND TENDON(S) OF POSTERIOR MUSCLE GROUP AT LOWER LEG LEVEL, UNSPECIFIED LEG, INITIAL ENCOUNTER: Chronic | ICD-10-CM

## 2023-06-12 DIAGNOSIS — D24.2 BENIGN NEOPLASM OF LEFT BREAST: Chronic | ICD-10-CM

## 2023-06-12 DIAGNOSIS — Z90.710 ACQUIRED ABSENCE OF BOTH CERVIX AND UTERUS: Chronic | ICD-10-CM

## 2023-06-12 DIAGNOSIS — K11.6 MUCOCELE OF SALIVARY GLAND: Chronic | ICD-10-CM

## 2023-06-12 DIAGNOSIS — M54.16 RADICULOPATHY, LUMBAR REGION: ICD-10-CM

## 2023-06-12 DIAGNOSIS — Z96.642 PRESENCE OF LEFT ARTIFICIAL HIP JOINT: Chronic | ICD-10-CM

## 2023-06-12 DIAGNOSIS — M43.10 SPONDYLOLISTHESIS, SITE UNSPECIFIED: ICD-10-CM

## 2023-06-12 DIAGNOSIS — M51.37 OTHER INTERVERTEBRAL DISC DEGENERATION, LUMBOSACRAL REGION: ICD-10-CM

## 2023-06-12 DIAGNOSIS — Z96.641 PRESENCE OF RIGHT ARTIFICIAL HIP JOINT: Chronic | ICD-10-CM

## 2023-06-12 DIAGNOSIS — Z01.818 ENCOUNTER FOR OTHER PREPROCEDURAL EXAMINATION: ICD-10-CM

## 2023-06-12 LAB
A1C WITH ESTIMATED AVERAGE GLUCOSE RESULT: 6 % — HIGH (ref 4–5.6)
ALBUMIN SERPL ELPH-MCNC: 3.9 G/DL — SIGNIFICANT CHANGE UP (ref 3.3–5)
ALP SERPL-CCNC: 78 U/L — SIGNIFICANT CHANGE UP (ref 30–120)
ALT FLD-CCNC: 29 U/L DA — SIGNIFICANT CHANGE UP (ref 10–60)
ANION GAP SERPL CALC-SCNC: 6 MMOL/L — SIGNIFICANT CHANGE UP (ref 5–17)
APTT BLD: 34.8 SEC — SIGNIFICANT CHANGE UP (ref 27.5–35.5)
AST SERPL-CCNC: 16 U/L — SIGNIFICANT CHANGE UP (ref 10–40)
BILIRUB SERPL-MCNC: 0.3 MG/DL — SIGNIFICANT CHANGE UP (ref 0.2–1.2)
BLD GP AB SCN SERPL QL: SIGNIFICANT CHANGE UP
BUN SERPL-MCNC: 14 MG/DL — SIGNIFICANT CHANGE UP (ref 7–23)
CALCIUM SERPL-MCNC: 9.8 MG/DL — SIGNIFICANT CHANGE UP (ref 8.4–10.5)
CHLORIDE SERPL-SCNC: 99 MMOL/L — SIGNIFICANT CHANGE UP (ref 96–108)
CO2 SERPL-SCNC: 28 MMOL/L — SIGNIFICANT CHANGE UP (ref 22–31)
CREAT SERPL-MCNC: 0.97 MG/DL — SIGNIFICANT CHANGE UP (ref 0.5–1.3)
EGFR: 63 ML/MIN/1.73M2 — SIGNIFICANT CHANGE UP
ESTIMATED AVERAGE GLUCOSE: 126 MG/DL — HIGH (ref 68–114)
GLUCOSE SERPL-MCNC: 98 MG/DL — SIGNIFICANT CHANGE UP (ref 70–99)
HCT VFR BLD CALC: 39.8 % — SIGNIFICANT CHANGE UP (ref 34.5–45)
HGB BLD-MCNC: 13.6 G/DL — SIGNIFICANT CHANGE UP (ref 11.5–15.5)
INR BLD: 1.04 RATIO — SIGNIFICANT CHANGE UP (ref 0.88–1.16)
MCHC RBC-ENTMCNC: 30.8 PG — SIGNIFICANT CHANGE UP (ref 27–34)
MCHC RBC-ENTMCNC: 34.2 GM/DL — SIGNIFICANT CHANGE UP (ref 32–36)
MCV RBC AUTO: 90 FL — SIGNIFICANT CHANGE UP (ref 80–100)
NRBC # BLD: 0 /100 WBCS — SIGNIFICANT CHANGE UP (ref 0–0)
PLATELET # BLD AUTO: 336 K/UL — SIGNIFICANT CHANGE UP (ref 150–400)
POTASSIUM SERPL-MCNC: 4.3 MMOL/L — SIGNIFICANT CHANGE UP (ref 3.5–5.3)
POTASSIUM SERPL-SCNC: 4.3 MMOL/L — SIGNIFICANT CHANGE UP (ref 3.5–5.3)
PROT SERPL-MCNC: 7.9 G/DL — SIGNIFICANT CHANGE UP (ref 6–8.3)
PROTHROM AB SERPL-ACNC: 12.3 SEC — SIGNIFICANT CHANGE UP (ref 10.5–13.4)
RBC # BLD: 4.42 M/UL — SIGNIFICANT CHANGE UP (ref 3.8–5.2)
RBC # FLD: 12.4 % — SIGNIFICANT CHANGE UP (ref 10.3–14.5)
SODIUM SERPL-SCNC: 133 MMOL/L — LOW (ref 135–145)
WBC # BLD: 8.68 K/UL — SIGNIFICANT CHANGE UP (ref 3.8–10.5)
WBC # FLD AUTO: 8.68 K/UL — SIGNIFICANT CHANGE UP (ref 3.8–10.5)

## 2023-06-12 PROCEDURE — 83036 HEMOGLOBIN GLYCOSYLATED A1C: CPT

## 2023-06-12 PROCEDURE — 80053 COMPREHEN METABOLIC PANEL: CPT

## 2023-06-12 PROCEDURE — 86900 BLOOD TYPING SEROLOGIC ABO: CPT

## 2023-06-12 PROCEDURE — G0463: CPT

## 2023-06-12 PROCEDURE — 85730 THROMBOPLASTIN TIME PARTIAL: CPT

## 2023-06-12 PROCEDURE — 86901 BLOOD TYPING SEROLOGIC RH(D): CPT

## 2023-06-12 PROCEDURE — 85027 COMPLETE CBC AUTOMATED: CPT

## 2023-06-12 PROCEDURE — 86850 RBC ANTIBODY SCREEN: CPT

## 2023-06-12 PROCEDURE — 36415 COLL VENOUS BLD VENIPUNCTURE: CPT

## 2023-06-12 PROCEDURE — 85610 PROTHROMBIN TIME: CPT

## 2023-06-12 RX ORDER — GABAPENTIN 400 MG/1
1 CAPSULE ORAL
Qty: 0 | Refills: 0 | DISCHARGE

## 2023-06-12 RX ORDER — DOCUSATE SODIUM 100 MG
1 CAPSULE ORAL
Qty: 0 | Refills: 0 | DISCHARGE

## 2023-06-12 RX ORDER — AZELAIC ACID 0.2 G/G
1 CREAM CUTANEOUS
Qty: 0 | Refills: 0 | DISCHARGE

## 2023-06-12 RX ORDER — AMOXICILLIN 250 MG/5ML
1 SUSPENSION, RECONSTITUTED, ORAL (ML) ORAL
Qty: 0 | Refills: 0 | DISCHARGE

## 2023-06-12 NOTE — H&P PST ADULT - ASSESSMENT
66 y/o female with lower back pain  Planned surgtery.- Transforminal lumbar interbody fusion and related procedures  Will obtain medical clearance, pulmonary/vascular  carditic clearance in chart  Pre op instructions provided  Instructions provided on medications to continue and to take the day morning of surgery

## 2023-06-12 NOTE — H&P PST ADULT - NSICDXFAMILYHX_GEN_ALL_CORE_FT
FAMILY HISTORY:  Family history of breast cancer in sister, HTN  Family history of colon cancer in father, prostate cancer , HTN  Family history of early CAD, CABG father  Family history of pheochromocytoma, sister  FH: HTN (hypertension), TIA; Mother  FH: HTN (hypertension), sisters

## 2023-06-12 NOTE — H&P PST ADULT - NSICDXPROCEDURE_GEN_ALL_CORE_FT
PROCEDURES:  Posterior lumbar interbody fusion (PLIF) 12-Jun-2023 08:23:39 L4-L5,posterior fusion L4 and related procedures Tasha Olguin

## 2023-06-12 NOTE — H&P PST ADULT - NSICDXPASTSURGICALHX_GEN_ALL_CORE_FT
PAST SURGICAL HISTORY:  Adenoma of left breast excision 1997    Cyst of salivary gland excision 1989    History of parathyroid surgery     History of pterygium excision left eye 2005    History of total hip replacement, left 2020    History of total hip replacement, right 2020    S/P ankle ligament repair left ankle 1985    S/P carpal tunnel release right 2009    S/P excision of neuroma left ankle 1988    S/P hysterectomy 2002    S/P Mohs surgery for basal cell carcinoma     S/P trigger finger release     Status post trigger finger release right thumb and middle finger 2011    Tibialis posterior tendon rupture repair and foot surgery  left 2012

## 2023-06-12 NOTE — H&P PST ADULT - HISTORY OF PRESENT ILLNESS
66 y/o female with PMH of HTN, Obesity, JOSE RAUL, HLD, hypothyroidism venous insufficiency, with lower back pain for many years, progressively got 66 y/o female with PMH of HTN, Obesity, JOSE RAUL, HLD, hypothyroidism venous insufficiency, with lower back pain for many years, progressively got worst with pain to both legs, numbness to toes. Had hip replacement in past.

## 2023-06-12 NOTE — H&P PST ADULT - NSICDXPASTMEDICALHX_GEN_ALL_CORE_FT
PAST MEDICAL HISTORY:  Allergic rhinitis     Chronic venous insufficiency     GERD (gastroesophageal reflux disease)     Glaucoma     H/O basal cell carcinoma excision     Hashimoto's thyroiditis     HTN (hypertension)     Hyperparathyroidism being monitored by endocrinologist    Hypothyroidism     Lumbar radiculopathy     Lumbar spinal stenosis     Migraines     Morbid obesity with BMI of 50.0-59.9, adult BMI 51.5    JOSE RAUL (obstructive sleep apnea) On CPAP    Osteoarthritis of right hip left hip, hands    Raynauds disease     Rosacea     Vertigo

## 2023-06-19 ENCOUNTER — TRANSCRIPTION ENCOUNTER (OUTPATIENT)
Age: 68
End: 2023-06-19

## 2023-06-19 NOTE — PATIENT PROFILE ADULT - FALL HARM RISK - UNIVERSAL INTERVENTIONS
Bed in lowest position, wheels locked, appropriate side rails in place/Call bell, personal items and telephone in reach/Instruct patient to call for assistance before getting out of bed or chair/Non-slip footwear when patient is out of bed/[place] to call system/Physically safe environment - no spills, clutter or unnecessary equipment/Purposeful Proactive Rounding/Room/bathroom lighting operational, light cord in reach

## 2023-06-19 NOTE — PROVIDER CONTACT NOTE (OTHER) - ASSESSMENT
The Spine Pre-Operative Education packet was given to the patient on 5/10/23. The patient and NP reviewed the information included in the packet. All her questions were answered and she gave a clear understanding of the instructions. She was advised to call the office at any time with further questions or concerns.

## 2023-06-20 ENCOUNTER — APPOINTMENT (OUTPATIENT)
Dept: ORTHOPEDIC SURGERY | Facility: HOSPITAL | Age: 68
End: 2023-06-20

## 2023-06-20 ENCOUNTER — INPATIENT (INPATIENT)
Facility: HOSPITAL | Age: 68
LOS: 1 days | Discharge: HOME | DRG: 454 | End: 2023-06-22
Attending: ORTHOPAEDIC SURGERY | Admitting: ORTHOPAEDIC SURGERY
Payer: COMMERCIAL

## 2023-06-20 ENCOUNTER — RESULT REVIEW (OUTPATIENT)
Age: 68
End: 2023-06-20

## 2023-06-20 ENCOUNTER — TRANSCRIPTION ENCOUNTER (OUTPATIENT)
Age: 68
End: 2023-06-20

## 2023-06-20 VITALS
TEMPERATURE: 98 F | OXYGEN SATURATION: 100 % | HEART RATE: 69 BPM | SYSTOLIC BLOOD PRESSURE: 166 MMHG | HEIGHT: 62 IN | DIASTOLIC BLOOD PRESSURE: 72 MMHG | RESPIRATION RATE: 15 BRPM | WEIGHT: 279.55 LBS

## 2023-06-20 DIAGNOSIS — S86.119A STRAIN OF OTHER MUSCLE(S) AND TENDON(S) OF POSTERIOR MUSCLE GROUP AT LOWER LEG LEVEL, UNSPECIFIED LEG, INITIAL ENCOUNTER: Chronic | ICD-10-CM

## 2023-06-20 DIAGNOSIS — Z01.818 ENCOUNTER FOR OTHER PREPROCEDURAL EXAMINATION: ICD-10-CM

## 2023-06-20 DIAGNOSIS — Z90.710 ACQUIRED ABSENCE OF BOTH CERVIX AND UTERUS: Chronic | ICD-10-CM

## 2023-06-20 DIAGNOSIS — Z98.890 OTHER SPECIFIED POSTPROCEDURAL STATES: Chronic | ICD-10-CM

## 2023-06-20 DIAGNOSIS — D24.2 BENIGN NEOPLASM OF LEFT BREAST: Chronic | ICD-10-CM

## 2023-06-20 DIAGNOSIS — M51.37 OTHER INTERVERTEBRAL DISC DEGENERATION, LUMBOSACRAL REGION: ICD-10-CM

## 2023-06-20 DIAGNOSIS — Z96.642 PRESENCE OF LEFT ARTIFICIAL HIP JOINT: Chronic | ICD-10-CM

## 2023-06-20 DIAGNOSIS — Z96.641 PRESENCE OF RIGHT ARTIFICIAL HIP JOINT: Chronic | ICD-10-CM

## 2023-06-20 DIAGNOSIS — K11.6 MUCOCELE OF SALIVARY GLAND: Chronic | ICD-10-CM

## 2023-06-20 DIAGNOSIS — M43.10 SPONDYLOLISTHESIS, SITE UNSPECIFIED: ICD-10-CM

## 2023-06-20 DIAGNOSIS — M54.16 RADICULOPATHY, LUMBAR REGION: ICD-10-CM

## 2023-06-20 PROBLEM — E03.9 HYPOTHYROIDISM, UNSPECIFIED: Chronic | Status: ACTIVE | Noted: 2023-06-12

## 2023-06-20 PROBLEM — M48.061 SPINAL STENOSIS, LUMBAR REGION WITHOUT NEUROGENIC CLAUDICATION: Chronic | Status: ACTIVE | Noted: 2023-06-12

## 2023-06-20 PROCEDURE — 99223 1ST HOSP IP/OBS HIGH 75: CPT

## 2023-06-20 PROCEDURE — 88304 TISSUE EXAM BY PATHOLOGIST: CPT | Mod: 26

## 2023-06-20 PROCEDURE — 63052 LAM FACETC/FRMT ARTHRD LUM 1: CPT

## 2023-06-20 PROCEDURE — 22842 INSERT SPINE FIXATION DEVICE: CPT | Mod: 82

## 2023-06-20 PROCEDURE — 22633 ARTHRD CMBN 1NTRSPC LUMBAR: CPT | Mod: 82

## 2023-06-20 PROCEDURE — 63052 LAM FACETC/FRMT ARTHRD LUM 1: CPT | Mod: 82

## 2023-06-20 PROCEDURE — 63053 LAM FACTC/FRMT ARTHRD LUM EA: CPT | Mod: 82

## 2023-06-20 PROCEDURE — 63053 LAM FACTC/FRMT ARTHRD LUM EA: CPT

## 2023-06-20 PROCEDURE — 22853 INSJ BIOMECHANICAL DEVICE: CPT

## 2023-06-20 PROCEDURE — 22634 ARTHRD CMBN 1NTRSPC EA ADDL: CPT

## 2023-06-20 PROCEDURE — 22634 ARTHRD CMBN 1NTRSPC EA ADDL: CPT | Mod: 82

## 2023-06-20 PROCEDURE — 22842 INSERT SPINE FIXATION DEVICE: CPT

## 2023-06-20 PROCEDURE — 22633 ARTHRD CMBN 1NTRSPC LUMBAR: CPT

## 2023-06-20 PROCEDURE — 22853 INSJ BIOMECHANICAL DEVICE: CPT | Mod: 82

## 2023-06-20 PROCEDURE — 20937 SP BONE AGRFT MORSEL ADD-ON: CPT

## 2023-06-20 DEVICE — IMPLANTABLE DEVICE: Type: IMPLANTABLE DEVICE | Status: FUNCTIONAL

## 2023-06-20 DEVICE — SURGIFOAM PAD 8CM X 12.5CM X 10MM (100): Type: IMPLANTABLE DEVICE | Status: FUNCTIONAL

## 2023-06-20 DEVICE — BONE WAX 2.5GM: Type: IMPLANTABLE DEVICE | Status: FUNCTIONAL

## 2023-06-20 DEVICE — ROD TI 60MM: Type: IMPLANTABLE DEVICE | Status: FUNCTIONAL

## 2023-06-20 DEVICE — SCREW MAS 6.5X45MM: Type: IMPLANTABLE DEVICE | Status: FUNCTIONAL

## 2023-06-20 DEVICE — SCREW MULTI AXIAL 7.5X40MM: Type: IMPLANTABLE DEVICE | Status: FUNCTIONAL

## 2023-06-20 DEVICE — SCREW SET: Type: IMPLANTABLE DEVICE | Status: FUNCTIONAL

## 2023-06-20 DEVICE — SURGICEL 2 X 14": Type: IMPLANTABLE DEVICE | Status: FUNCTIONAL

## 2023-06-20 DEVICE — SURGIFLO HEMOSTATIC MATRIX KIT: Type: IMPLANTABLE DEVICE | Status: FUNCTIONAL

## 2023-06-20 RX ORDER — HYDROMORPHONE HYDROCHLORIDE 2 MG/ML
0.25 INJECTION INTRAMUSCULAR; INTRAVENOUS; SUBCUTANEOUS
Refills: 0 | Status: DISCONTINUED | OUTPATIENT
Start: 2023-06-20 | End: 2023-06-20

## 2023-06-20 RX ORDER — POLYETHYLENE GLYCOL 3350 17 G/17G
17 POWDER, FOR SOLUTION ORAL AT BEDTIME
Refills: 0 | Status: DISCONTINUED | OUTPATIENT
Start: 2023-06-20 | End: 2023-06-22

## 2023-06-20 RX ORDER — GABAPENTIN 400 MG/1
300 CAPSULE ORAL AT BEDTIME
Refills: 0 | Status: DISCONTINUED | OUTPATIENT
Start: 2023-06-20 | End: 2023-06-22

## 2023-06-20 RX ORDER — ACETAMINOPHEN 500 MG
1000 TABLET ORAL ONCE
Refills: 0 | Status: DISCONTINUED | OUTPATIENT
Start: 2023-06-20 | End: 2023-06-20

## 2023-06-20 RX ORDER — DIAZEPAM 5 MG
5 TABLET ORAL EVERY 12 HOURS
Refills: 0 | Status: DISCONTINUED | OUTPATIENT
Start: 2023-06-20 | End: 2023-06-22

## 2023-06-20 RX ORDER — MECLIZINE HCL 12.5 MG
25 TABLET ORAL EVERY 8 HOURS
Refills: 0 | Status: DISCONTINUED | OUTPATIENT
Start: 2023-06-20 | End: 2023-06-22

## 2023-06-20 RX ORDER — SODIUM CHLORIDE 9 MG/ML
1000 INJECTION, SOLUTION INTRAVENOUS
Refills: 0 | Status: DISCONTINUED | OUTPATIENT
Start: 2023-06-20 | End: 2023-06-22

## 2023-06-20 RX ORDER — HYDROMORPHONE HYDROCHLORIDE 2 MG/ML
0.5 INJECTION INTRAMUSCULAR; INTRAVENOUS; SUBCUTANEOUS
Refills: 0 | Status: DISCONTINUED | OUTPATIENT
Start: 2023-06-20 | End: 2023-06-22

## 2023-06-20 RX ORDER — LORATADINE 10 MG/1
10 TABLET ORAL DAILY
Refills: 0 | Status: DISCONTINUED | OUTPATIENT
Start: 2023-06-20 | End: 2023-06-22

## 2023-06-20 RX ORDER — SENNA PLUS 8.6 MG/1
2 TABLET ORAL AT BEDTIME
Refills: 0 | Status: DISCONTINUED | OUTPATIENT
Start: 2023-06-20 | End: 2023-06-22

## 2023-06-20 RX ORDER — SODIUM CHLORIDE 9 MG/ML
1000 INJECTION, SOLUTION INTRAVENOUS
Refills: 0 | Status: DISCONTINUED | OUTPATIENT
Start: 2023-06-20 | End: 2023-06-20

## 2023-06-20 RX ORDER — CEFAZOLIN SODIUM 1 G
3000 VIAL (EA) INJECTION ONCE
Refills: 0 | Status: COMPLETED | OUTPATIENT
Start: 2023-06-20 | End: 2023-06-20

## 2023-06-20 RX ORDER — ACETAMINOPHEN 500 MG
1000 TABLET ORAL ONCE
Refills: 0 | Status: COMPLETED | OUTPATIENT
Start: 2023-06-20 | End: 2023-06-20

## 2023-06-20 RX ORDER — HYDROMORPHONE HYDROCHLORIDE 2 MG/ML
0.5 INJECTION INTRAMUSCULAR; INTRAVENOUS; SUBCUTANEOUS
Refills: 0 | Status: DISCONTINUED | OUTPATIENT
Start: 2023-06-20 | End: 2023-06-20

## 2023-06-20 RX ORDER — LATANOPROST 0.05 MG/ML
1 SOLUTION/ DROPS OPHTHALMIC; TOPICAL AT BEDTIME
Refills: 0 | Status: DISCONTINUED | OUTPATIENT
Start: 2023-06-20 | End: 2023-06-22

## 2023-06-20 RX ORDER — CEFAZOLIN SODIUM 1 G
2000 VIAL (EA) INJECTION EVERY 8 HOURS
Refills: 0 | Status: COMPLETED | OUTPATIENT
Start: 2023-06-20 | End: 2023-06-21

## 2023-06-20 RX ORDER — HYDROMORPHONE HYDROCHLORIDE 2 MG/ML
2 INJECTION INTRAMUSCULAR; INTRAVENOUS; SUBCUTANEOUS
Refills: 0 | Status: DISCONTINUED | OUTPATIENT
Start: 2023-06-20 | End: 2023-06-20

## 2023-06-20 RX ORDER — ACETAMINOPHEN 500 MG
1000 TABLET ORAL ONCE
Refills: 0 | Status: COMPLETED | OUTPATIENT
Start: 2023-06-21 | End: 2023-06-20

## 2023-06-20 RX ORDER — HYDROMORPHONE HYDROCHLORIDE 2 MG/ML
4 INJECTION INTRAMUSCULAR; INTRAVENOUS; SUBCUTANEOUS
Refills: 0 | Status: DISCONTINUED | OUTPATIENT
Start: 2023-06-20 | End: 2023-06-22

## 2023-06-20 RX ORDER — ONDANSETRON 8 MG/1
4 TABLET, FILM COATED ORAL ONCE
Refills: 0 | Status: COMPLETED | OUTPATIENT
Start: 2023-06-20 | End: 2023-06-20

## 2023-06-20 RX ORDER — LEVOTHYROXINE SODIUM 125 MCG
50 TABLET ORAL DAILY
Refills: 0 | Status: DISCONTINUED | OUTPATIENT
Start: 2023-06-20 | End: 2023-06-22

## 2023-06-20 RX ORDER — HYDROMORPHONE HYDROCHLORIDE 2 MG/ML
2 INJECTION INTRAMUSCULAR; INTRAVENOUS; SUBCUTANEOUS
Refills: 0 | Status: DISCONTINUED | OUTPATIENT
Start: 2023-06-20 | End: 2023-06-22

## 2023-06-20 RX ORDER — DIAZEPAM 5 MG
5 TABLET ORAL ONCE
Refills: 0 | Status: DISCONTINUED | OUTPATIENT
Start: 2023-06-20 | End: 2023-06-20

## 2023-06-20 RX ORDER — LOSARTAN POTASSIUM 100 MG/1
100 TABLET, FILM COATED ORAL DAILY
Refills: 0 | Status: DISCONTINUED | OUTPATIENT
Start: 2023-06-22 | End: 2023-06-22

## 2023-06-20 RX ORDER — ACETAMINOPHEN 500 MG
1000 TABLET ORAL EVERY 8 HOURS
Refills: 0 | Status: DISCONTINUED | OUTPATIENT
Start: 2023-06-21 | End: 2023-06-22

## 2023-06-20 RX ORDER — ONDANSETRON 8 MG/1
4 TABLET, FILM COATED ORAL EVERY 6 HOURS
Refills: 0 | Status: DISCONTINUED | OUTPATIENT
Start: 2023-06-20 | End: 2023-06-22

## 2023-06-20 RX ORDER — TRANEXAMIC ACID 100 MG/ML
2000 INJECTION, SOLUTION INTRAVENOUS ONCE
Refills: 0 | Status: COMPLETED | OUTPATIENT
Start: 2023-06-20 | End: 2023-06-20

## 2023-06-20 RX ORDER — APREPITANT 80 MG/1
40 CAPSULE ORAL ONCE
Refills: 0 | Status: COMPLETED | OUTPATIENT
Start: 2023-06-20 | End: 2023-06-20

## 2023-06-20 RX ADMIN — HYDROMORPHONE HYDROCHLORIDE 0.5 MILLIGRAM(S): 2 INJECTION INTRAMUSCULAR; INTRAVENOUS; SUBCUTANEOUS at 15:15

## 2023-06-20 RX ADMIN — Medication 400 MILLIGRAM(S): at 18:38

## 2023-06-20 RX ADMIN — Medication 100 MILLIGRAM(S): at 21:27

## 2023-06-20 RX ADMIN — SODIUM CHLORIDE 125 MILLILITER(S): 9 INJECTION, SOLUTION INTRAVENOUS at 18:38

## 2023-06-20 RX ADMIN — APREPITANT 40 MILLIGRAM(S): 80 CAPSULE ORAL at 07:23

## 2023-06-20 RX ADMIN — POLYETHYLENE GLYCOL 3350 17 GRAM(S): 17 POWDER, FOR SOLUTION ORAL at 21:27

## 2023-06-20 RX ADMIN — LATANOPROST 1 DROP(S): 0.05 SOLUTION/ DROPS OPHTHALMIC; TOPICAL at 21:27

## 2023-06-20 RX ADMIN — HYDROMORPHONE HYDROCHLORIDE 0.5 MILLIGRAM(S): 2 INJECTION INTRAMUSCULAR; INTRAVENOUS; SUBCUTANEOUS at 15:12

## 2023-06-20 RX ADMIN — Medication 5 MILLIGRAM(S): at 18:38

## 2023-06-20 RX ADMIN — ONDANSETRON 4 MILLIGRAM(S): 8 TABLET, FILM COATED ORAL at 15:19

## 2023-06-20 RX ADMIN — HYDROMORPHONE HYDROCHLORIDE 0.5 MILLIGRAM(S): 2 INJECTION INTRAMUSCULAR; INTRAVENOUS; SUBCUTANEOUS at 17:25

## 2023-06-20 RX ADMIN — SODIUM CHLORIDE 125 MILLILITER(S): 9 INJECTION, SOLUTION INTRAVENOUS at 21:27

## 2023-06-20 RX ADMIN — Medication 1000 MILLIGRAM(S): at 23:44

## 2023-06-20 RX ADMIN — SENNA PLUS 2 TABLET(S): 8.6 TABLET ORAL at 21:27

## 2023-06-20 RX ADMIN — SODIUM CHLORIDE 70 MILLILITER(S): 9 INJECTION, SOLUTION INTRAVENOUS at 15:13

## 2023-06-20 RX ADMIN — Medication 400 MILLIGRAM(S): at 23:18

## 2023-06-20 RX ADMIN — Medication 1000 MILLIGRAM(S): at 19:24

## 2023-06-20 RX ADMIN — Medication 200 MILLIGRAM(S): at 17:25

## 2023-06-20 RX ADMIN — GABAPENTIN 300 MILLIGRAM(S): 400 CAPSULE ORAL at 21:27

## 2023-06-20 NOTE — BRIEF OPERATIVE NOTE - NSICDXBRIEFPREOP_GEN_ALL_CORE_FT
PRE-OP DIAGNOSIS:  Spondylolisthesis, lumbar region 20-Jun-2023 14:43:00 L4-5, L5-S1 Gurjit Mccormick  DDD (degenerative disc disease), lumbosacral 20-Jun-2023 14:43:27  Gurjit Mccormick

## 2023-06-20 NOTE — PHYSICAL THERAPY INITIAL EVALUATION ADULT - ADDITIONAL COMMENTS
Pt resides in pvt home with son. Pt states 3STE no HR, states will be residing on first floor upon d/c. Pt has RW and cane. Pt has tub. Pt reports was independent prior to admission with use of cane.

## 2023-06-20 NOTE — PHYSICAL THERAPY INITIAL EVALUATION ADULT - FUNCTIONAL LIMITATIONS, PT EVAL
self-care/home management/community/leisure Where Do You Want The Question To Include Opioid Counseling Located?: Case Summary Tab

## 2023-06-20 NOTE — CONSULT NOTE ADULT - ASSESSMENT
*** Incomplete ***    POD#0 s/p Lumber Fusion  - Pain control  - Bowel regimen  - PT/OT  - Incentive spirometry  - VTE PPx - b/l SCDs    HTN  - resume avapro on POD#2  - resume spironolactone on POD#2    Severe Obesity, Prediabetes  - resume Mounjaro upon discharge  - monitor glucose on BMP    JOSE RAUL  - monitor on tele  - CPAP QHS    Glaucoma  - resume eye drops POD#0 s/p Lumber Fusion  - Pain control  - Bowel regimen  - PT/OT  - Incentive spirometry  - VTE PPx - b/l SCDs    HTN  - resume avapro on POD#2  - resume spironolactone on POD#2    Severe Obesity, Prediabetes  - resume Mounjaro upon discharge  - monitor glucose on BMP    JOSE RAUL  - monitor on tele  - reviewed pulm preop -> autopap 6/16 or CPAP @ 13 if autopap not available  - CPAP QHS    Glaucoma  - resume eye drops POD#0 s/p Lumber Fusion  - Pain control  - Bowel regimen  - PT/OT  - Incentive spirometry  - VTE PPx - b/l SCDs    HTN  - resume avapro on POD#2  - resume spironolactone on POD#2    Severe Obesity, Prediabetes  - resume Mounjaro upon discharge  - monitor glucose on BMP    JOSE RAUL  - monitor on tele  - reviewed pulm preop -> autopap 6/16 or CPAP @ 13 if autopap not available  - CPAP QHS    Glaucoma  - resume eye drops    75 min spent total

## 2023-06-20 NOTE — CONSULT NOTE ADULT - SUBJECTIVE AND OBJECTIVE BOX
Information Obtained from:  EHR, Physical Chart, Patient at bedside     HPI:  68yo F admitted s/p lumbar fusion surgery.  Has had chronic LBP for years, worse recently with radiation into b/l legs, numbness/tingling, affecting quality of life.  No fevers, chills, sweats, dizziness, HA, changes in vision, cp, palpitations, sob, persistent cough, n/v/d, abd pain, dysuria, focal weakness, or calf pain.      REVIEW OF SYSTEMS:  see HPI, others negative    PAST MEDICAL & SURGICAL HISTORY:  HTN (hypertension)      Vertigo      Hashimoto's thyroiditis      Raynauds disease      GERD (gastroesophageal reflux disease)      Allergic rhinitis      Migraines      Rosacea      Hyperparathyroidism  being monitored by endocrinologist      Glaucoma      Chronic venous insufficiency      Morbid obesity with BMI of 50.0-59.9, adult  BMI 51.5      Osteoarthritis of right hip  left hip, hands      JOSE RAUL (obstructive sleep apnea)  On CPAP      Hypothyroidism      H/O basal cell carcinoma excision      Lumbar spinal stenosis      Lumbar radiculopathy      S/P ankle ligament repair  left ankle 1985      S/P excision of neuroma  left ankle 1988      Adenoma of left breast  excision 1997      Cyst of salivary gland  excision 1989      S/P carpal tunnel release  right 2009      Status post trigger finger release  right thumb and middle finger 2011      S/P hysterectomy  2002      Tibialis posterior tendon rupture  repair and foot surgery  left 2012      History of pterygium excision  left eye 2005      History of total hip replacement, left  2020      History of total hip replacement, right  2020      S/P Mohs surgery for basal cell carcinoma      S/P trigger finger release      History of parathyroid surgery    SOCIAL HISTORY:  ETOH consumption: none  Smoking Hx: none    Allergies    adhesives (Rash)  Cardizem (Rash)  lobster (Rash)  Fish Products (Rash)  Flonase (Swelling)  Levaquin (Rash)    Intolerances         Home Medications:  Avapro 300 mg oral tablet: 1 tab(s) orally once a day (20 Jun 2023 07:46)  Fish Oil 1000 mg oral capsule: 1 cap(s) orally once a day (20 Jun 2023 07:46)  latanoprost 0.005% ophthalmic solution: 1 drop(s) to each affected eye once a day (in the evening) (20 Jun 2023 07:46)  meclizine 25 mg oral tablet: orally every 8 hours, As Needed (20 Jun 2023 07:46)  Motrin 400 mg oral tablet: 1 tab(s) orally every 6 hours, As Needed (20 Jun 2023 07:46)  Mounjaro 5 mg/0.5 mL subcutaneous solution: 5 subcutaneously (20 Jun 2023 07:46)  Multi-Day Plus Minerals oral tablet: 1 tab(s) orally once a day (20 Jun 2023 07:46)  Neurontin 300 mg oral capsule: 1 orally once a day (at bedtime) (20 Jun 2023 07:46)  Opzelura 1.5% topical cream: Apply topically to affected area 2 times a day (20 Jun 2023 07:46)  Pataday 0.2% ophthalmic solution: 1 drop(s) to each affected eye once a day, As Needed (20 Jun 2023 07:46)  spironolactone 50 mg oral tablet: 1 tab(s) orally once a day (20 Jun 2023 07:46)  Synthroid 100 mcg (0.1 mg) oral tablet: 1 tab(s) orally once a week 7th day  (20 Jun 2023 07:46)  Synthroid 50 mcg (0.05 mg) oral tablet: 1 tab(s) orally once a day for 6  days  (20 Jun 2023 07:46)  Turmeric 500 mg oral capsule: 2 cap(s) orally once a day (20 Jun 2023 07:46)  Tylenol 325 mg oral tablet: 2 tab(s) orally every 4 hours, As Needed (20 Jun 2023 07:46)  Vitamin D3 25 mcg (1000 intl units) oral tablet: 1 tab(s) orally once a day (20 Jun 2023 07:46)  ZyrTEC 10 mg oral tablet: 1 tab(s) orally once a day (20 Jun 2023 07:46)      FAMILY HISTORY:  Family history of early CAD  CABG father    Family history of colon cancer in father  prostate cancer , HTN    FH: HTN (hypertension)  TIA; Mother    Family history of breast cancer in sister  HTN    Family history of pheochromocytoma  sister    FH: HTN (hypertension)  sisters        PHYSICAL EXAM:  Vital Signs Last 24 Hrs  T(C): 36.6 (20 Jun 2023 14:37), Max: 36.8 (20 Jun 2023 07:14)  T(F): 97.9 (20 Jun 2023 14:37), Max: 98.2 (20 Jun 2023 07:14)  HR: 58 (20 Jun 2023 16:15) (54 - 74)  BP: 153/62 (20 Jun 2023 16:15) (147/65 - 166/72)  BP(mean): --  RR: 15 (20 Jun 2023 16:15) (10 - 19)  SpO2: 98% (20 Jun 2023 16:15) (94% - 100%)    Parameters below as of 20 Jun 2023 14:37  Patient On (Oxygen Delivery Method): nasal cannula  O2 Flow (L/min): 2      GENERAL: NAD, well-groomed, well-developed, awake, alert, oriented x 3, fluent and coherent speech  EYES: EOMI, conjunctiva and sclera clear  ENMT: No tonsillar erythema, exudates, or enlargement; Moist mucous membranes, No lesions on oral mucosa  NECK: Supple, No JVD, No Cervical LAD, No thyromegaly, No thyroid nodules  NERVOUS SYSTEM:  Good concentration; Moving all 4 extremities; No gross sensory deficits, No facial droop  CHEST/LUNG: Clear to auscultation bilaterally; No rales, rhonchi, wheezing, or rubs  HEART: distant HS; No murmurs, rubs, or gallops  ABDOMEN: Soft, Nontender, Bowel sounds present, exam limited due to large body habitus  EXTREMITIES:  2+ Peripheral Pulses, No clubbing, cyanosis, or edema  INCISION:  Dressing clean/dry/intact    LABS:  hgba1c 6.0    EKG (was personally reviewed):    yes, NSR

## 2023-06-20 NOTE — PHYSICAL THERAPY INITIAL EVALUATION ADULT - DID THE PATIENT HAVE SURGERY?
transforaminal lumbar interbody fusion L4-5, posterior spinal fusion L4-S1, iliac crest bone graft, allograft, spinal instrumentation, lumbar laminectomy and all related procedures/yes

## 2023-06-20 NOTE — PROGRESS NOTE ADULT - SUBJECTIVE AND OBJECTIVE BOX
POST OPERATIVE DAY #:    STATUS POST: L4-5, L5-S1 TLIF POD # 0    SUBJECTIVE: Patient seen and examined. Resting in Bed. Received IV Ofirmev and IV Valium due to post-operative nausea in PACU. Nausea responded to IV Reglan/Zofran and patient was able to tolerate small bites of food on the floor and PO liquid. Liriano in place, draining to gravity  Patient states that preoperatively she had more weakness on left but more pain on right side of body/down legs. Post-operatively, patient reports that right side is "heavier" and "slightly more painful"  Denies CP/SOB/HA/V/Nausea (Resolved at this time)/Dizziness/Numbness/Tingling.     Pain (0-10): 7.5/10 at this time but pain medication that she just received was starting to kick in and that she is becoming more comfortable.       OBJECTIVE:     Vital Signs Last 24 Hrs  T(C): 37.1 (20 Jun 2023 18:07), Max: 37.1 (20 Jun 2023 18:07)  T(F): 98.7 (20 Jun 2023 18:07), Max: 98.7 (20 Jun 2023 18:07)  HR: 57 (20 Jun 2023 18:07) (54 - 74)  BP: 159/63 (20 Jun 2023 18:07) (146/60 - 166/72)  RR: 16 (20 Jun 2023 18:07) (10 - 19)  SpO2: 99% (20 Jun 2023 18:07) (94% - 100%)    Parameters below as of 20 Jun 2023 18:07  Patient On (Oxygen Delivery Method): room air                   Spine          Tape and Dressing:  clean/dry/intact without gross drainage or seeping appreciated . +HV in place and draining serosanguineous fluid          Sensation:  intact to light touch           Motor exam:        B/L Upper: +Flexion/Extension of Elbows/Shrug Shoulders/OK sign/Thumbs Up/2-3x/1WS sensation//Fist/extension of fingers       B/L Lower: Right side is weaker than left at this time. Right is 3+/5 DF/PF/EHL and Left is 4+/5 DF/PF/EHL. Patient states that right side feels "heavier". Able to activae Quads and Extend knee into hand b/l           Calves supple and NT  , Patient states that she feels some tenderness which is similar to her pre-operative Vascular evaluation which R/O a clot. No increase in calf pain at this time                                            2+ Radial b/l and Posterior Tibialis pulses          SCDs in place                   A/P :  67y Female, stable,  s/p    L4-5, L5-S1 TLIF POD # 0  -    Pain control with acetaminophen Dilaudid   -    DVT ppx: SCDs    -    RADHA Liriano on POD#1  -    Follow up regarding strength and weakness as time progress. May be positional or due to pain at this time.   -    Encourage Incentive Spirometry  -    Physical Therapy  -    Occupational Therapy  -    Weight bearing status: WBAT  -    Brace ordered for comfort  -    Discharge Plan: home pending progress with PT and adequate pain control

## 2023-06-20 NOTE — BRIEF OPERATIVE NOTE - NSICDXBRIEFPROCEDURE_GEN_ALL_CORE_FT
PROCEDURES:  Fusion, spine, lumbar, TLIF, 1-2 levels 20-Jun-2023 14:42:22 TLIF of L4-5 and L5-S1 Gurjit Mccormick

## 2023-06-20 NOTE — PHYSICAL THERAPY INITIAL EVALUATION ADULT - PERTINENT HX OF CURRENT PROBLEM, REHAB EVAL
pt is a 66 y/o F s/p transforaminal lumbar interbody fusion L4-5, posterior spinal fusion L4-S1, iliac crest bone graft, allograft, spinal instrumentation, lumbar laminectomy and all related procedures 6/20/23

## 2023-06-20 NOTE — BRIEF OPERATIVE NOTE - NSICDXBRIEFPOSTOP_GEN_ALL_CORE_FT
POST-OP DIAGNOSIS:  DDD (degenerative disc disease), lumbosacral 20-Jun-2023 14:43:48  Gurjit Mccormick  Spondylolisthesis, lumbar region 20-Jun-2023 14:43:36 L4-5, L5-S1 Gurjit Mccormick

## 2023-06-20 NOTE — CHART NOTE - NSCHARTNOTEFT_GEN_A_CORE
Pt seen in PACU to meassure and fit for LSO to be used for all OOB activities, remove for sleeping.  Pt measured, expansion panels added to accommodate waist circumference  Pt instructed to karena and jourdan  Written instructions and office contact information provided.  Follow up if needed    Antonio Arroyo CO  [phone]   [address]

## 2023-06-21 LAB
ANION GAP SERPL CALC-SCNC: 9 MMOL/L — SIGNIFICANT CHANGE UP (ref 5–17)
BASOPHILS # BLD AUTO: 0.03 K/UL — SIGNIFICANT CHANGE UP (ref 0–0.2)
BASOPHILS NFR BLD AUTO: 0.2 % — SIGNIFICANT CHANGE UP (ref 0–2)
BUN SERPL-MCNC: 10 MG/DL — SIGNIFICANT CHANGE UP (ref 7–23)
CALCIUM SERPL-MCNC: 8.8 MG/DL — SIGNIFICANT CHANGE UP (ref 8.4–10.5)
CHLORIDE SERPL-SCNC: 103 MMOL/L — SIGNIFICANT CHANGE UP (ref 96–108)
CO2 SERPL-SCNC: 25 MMOL/L — SIGNIFICANT CHANGE UP (ref 22–31)
CREAT SERPL-MCNC: 0.97 MG/DL — SIGNIFICANT CHANGE UP (ref 0.5–1.3)
EGFR: 63 ML/MIN/1.73M2 — SIGNIFICANT CHANGE UP
EOSINOPHIL # BLD AUTO: 0.01 K/UL — SIGNIFICANT CHANGE UP (ref 0–0.5)
EOSINOPHIL NFR BLD AUTO: 0.1 % — SIGNIFICANT CHANGE UP (ref 0–6)
GLUCOSE SERPL-MCNC: 96 MG/DL — SIGNIFICANT CHANGE UP (ref 70–99)
HCT VFR BLD CALC: 34.5 % — SIGNIFICANT CHANGE UP (ref 34.5–45)
HGB BLD-MCNC: 11.7 G/DL — SIGNIFICANT CHANGE UP (ref 11.5–15.5)
IMM GRANULOCYTES NFR BLD AUTO: 0.6 % — SIGNIFICANT CHANGE UP (ref 0–0.9)
LYMPHOCYTES # BLD AUTO: 1.51 K/UL — SIGNIFICANT CHANGE UP (ref 1–3.3)
LYMPHOCYTES # BLD AUTO: 12.5 % — LOW (ref 13–44)
MCHC RBC-ENTMCNC: 30.9 PG — SIGNIFICANT CHANGE UP (ref 27–34)
MCHC RBC-ENTMCNC: 33.9 GM/DL — SIGNIFICANT CHANGE UP (ref 32–36)
MCV RBC AUTO: 91 FL — SIGNIFICANT CHANGE UP (ref 80–100)
MONOCYTES # BLD AUTO: 0.71 K/UL — SIGNIFICANT CHANGE UP (ref 0–0.9)
MONOCYTES NFR BLD AUTO: 5.9 % — SIGNIFICANT CHANGE UP (ref 2–14)
NEUTROPHILS # BLD AUTO: 9.77 K/UL — HIGH (ref 1.8–7.4)
NEUTROPHILS NFR BLD AUTO: 80.7 % — HIGH (ref 43–77)
NRBC # BLD: 0 /100 WBCS — SIGNIFICANT CHANGE UP (ref 0–0)
PLATELET # BLD AUTO: 300 K/UL — SIGNIFICANT CHANGE UP (ref 150–400)
POTASSIUM SERPL-MCNC: 4.1 MMOL/L — SIGNIFICANT CHANGE UP (ref 3.5–5.3)
POTASSIUM SERPL-SCNC: 4.1 MMOL/L — SIGNIFICANT CHANGE UP (ref 3.5–5.3)
RBC # BLD: 3.79 M/UL — LOW (ref 3.8–5.2)
RBC # FLD: 12.8 % — SIGNIFICANT CHANGE UP (ref 10.3–14.5)
SODIUM SERPL-SCNC: 137 MMOL/L — SIGNIFICANT CHANGE UP (ref 135–145)
WBC # BLD: 12.1 K/UL — HIGH (ref 3.8–10.5)
WBC # FLD AUTO: 12.1 K/UL — HIGH (ref 3.8–10.5)

## 2023-06-21 PROCEDURE — 72100 X-RAY EXAM L-S SPINE 2/3 VWS: CPT | Mod: 26

## 2023-06-21 PROCEDURE — 99223 1ST HOSP IP/OBS HIGH 75: CPT

## 2023-06-21 RX ORDER — HYDROCORTISONE 1 %
1 OINTMENT (GRAM) TOPICAL
Refills: 0 | Status: DISCONTINUED | OUTPATIENT
Start: 2023-06-21 | End: 2023-06-22

## 2023-06-21 RX ADMIN — Medication 100 MILLIGRAM(S): at 05:33

## 2023-06-21 RX ADMIN — HYDROMORPHONE HYDROCHLORIDE 2 MILLIGRAM(S): 2 INJECTION INTRAMUSCULAR; INTRAVENOUS; SUBCUTANEOUS at 13:30

## 2023-06-21 RX ADMIN — Medication 1000 MILLIGRAM(S): at 13:40

## 2023-06-21 RX ADMIN — LORATADINE 10 MILLIGRAM(S): 10 TABLET ORAL at 12:37

## 2023-06-21 RX ADMIN — HYDROMORPHONE HYDROCHLORIDE 2 MILLIGRAM(S): 2 INJECTION INTRAMUSCULAR; INTRAVENOUS; SUBCUTANEOUS at 09:20

## 2023-06-21 RX ADMIN — SODIUM CHLORIDE 125 MILLILITER(S): 9 INJECTION, SOLUTION INTRAVENOUS at 05:34

## 2023-06-21 RX ADMIN — Medication 1 APPLICATION(S): at 12:39

## 2023-06-21 RX ADMIN — Medication 1000 MILLIGRAM(S): at 05:33

## 2023-06-21 RX ADMIN — HYDROMORPHONE HYDROCHLORIDE 2 MILLIGRAM(S): 2 INJECTION INTRAMUSCULAR; INTRAVENOUS; SUBCUTANEOUS at 08:28

## 2023-06-21 RX ADMIN — HYDROMORPHONE HYDROCHLORIDE 2 MILLIGRAM(S): 2 INJECTION INTRAMUSCULAR; INTRAVENOUS; SUBCUTANEOUS at 12:37

## 2023-06-21 RX ADMIN — HYDROMORPHONE HYDROCHLORIDE 2 MILLIGRAM(S): 2 INJECTION INTRAMUSCULAR; INTRAVENOUS; SUBCUTANEOUS at 17:28

## 2023-06-21 RX ADMIN — GABAPENTIN 300 MILLIGRAM(S): 400 CAPSULE ORAL at 22:12

## 2023-06-21 RX ADMIN — HYDROMORPHONE HYDROCHLORIDE 2 MILLIGRAM(S): 2 INJECTION INTRAMUSCULAR; INTRAVENOUS; SUBCUTANEOUS at 18:20

## 2023-06-21 RX ADMIN — Medication 1000 MILLIGRAM(S): at 13:17

## 2023-06-21 RX ADMIN — Medication 1000 MILLIGRAM(S): at 22:12

## 2023-06-21 RX ADMIN — LATANOPROST 1 DROP(S): 0.05 SOLUTION/ DROPS OPHTHALMIC; TOPICAL at 22:12

## 2023-06-21 RX ADMIN — Medication 1 APPLICATION(S): at 17:29

## 2023-06-21 RX ADMIN — SENNA PLUS 2 TABLET(S): 8.6 TABLET ORAL at 22:12

## 2023-06-21 RX ADMIN — Medication 50 MICROGRAM(S): at 05:33

## 2023-06-21 RX ADMIN — Medication 1000 MILLIGRAM(S): at 06:36

## 2023-06-21 RX ADMIN — Medication 1000 MILLIGRAM(S): at 22:14

## 2023-06-21 NOTE — CARE COORDINATION ASSESSMENT. - OTHER PERTINENT DISCHARGE PLANNING INFORMATION:
RN CM  and patient assessment; transition of care planning completed at bedside; Alert & Oriented X4;  CM role; DC planning process explained verbalized understanding;  Patient reports she still works; [facility]; 3 steps to front door; patient will be staying on the 1st fl. to convalesce; patient independent in ADLs, iADLs, very good compliance with home medications; f/u visits with JONH Eduardo MD / PCP;  Patient has chosen [facility] ; referral in progress; will arrange own transport at [place];   TRANSITION OF CARE PLAN: DC TO HOME WITH [facility]; TO F/U WITH  after two weeks; will arrange own transport at [place]. RN CM  and patient assessment; transition of care planning completed at bedside; Alert & Oriented X4;  CM role; DC planning process explained verbalized understanding;  Patient reports she still works; [place] [facility]; 3 steps to front door; patient will be staying on the 1st fl. to convalesce; patient independent in ADLs, iADLs, very good compliance with home medications; f/u visits with JONH Eduardo MD / PCP;. Pt has RW and cane. Pt has tub. Pt reports was ambulates with use of cane indoors/ outdoors;  Patient has chosen [facility] ; referral in progress; will arrange own transport at [place];   TRANSITION OF CARE PLAN: DC TO HOME WITH [facility]; TO F/U WITH  after two weeks; will arrange own transport at [place].

## 2023-06-21 NOTE — OCCUPATIONAL THERAPY INITIAL EVALUATION ADULT - ASR WT BEARING STATUS EVAL
HPI:    Patient ID: Chico Aguilar is a 37year old female.     HPI     PHysical exam    Generally healthy    /67 (BP Location: Right arm, Patient Position: Sitting, Cuff Size: adult)   Pulse 86   Temp 98 °F (36.7 °C) (Oral)   Ht 5' 5\" (1.651 m)   W RESPIRATORY FAILURE, ANAPHYLAXIS  Shellfish               RESPIRATORY FAILURE    HISTORY:  Past Medical History:   Diagnosis Date   • Asthma    • Decorative tattoo    • Eczema    • Nasal mass 2014    benign, excision of mass      Past Surgical Histor no weight-bearing restrictions not diaphoretic. No erythema. Nursing note and vitals reviewed.            ASSESSMENT/PLAN:   (Z00.00) Routine general medical examination at a health care facility  (primary encounter diagnosis)  Plan: ASSAY, THYROID STIM HORMONE, FREE T4 (FREE         T

## 2023-06-21 NOTE — PROGRESS NOTE ADULT - SUBJECTIVE AND OBJECTIVE BOX
HEALTH ISSUES - PROBLEM Dx:    s/p TLIF L4-L5, L5-S1       INTERVAL HPI/OVERNIGHT EVENTS:    morbid obesity  lying in bed  ambualted with PT  denies pain  c/o itchy chin sec to adhesive tape    REVIEW OF SYSTEMS:    as above    Vital Signs Last 24 Hrs  T(C): 36.7 (21 Jun 2023 08:17), Max: 37.3 (20 Jun 2023 22:15)  T(F): 98.1 (21 Jun 2023 08:17), Max: 99.2 (20 Jun 2023 22:15)  HR: 55 (21 Jun 2023 08:17) (54 - 74)  BP: 126/57 (21 Jun 2023 08:17) (120/55 - 162/69)  BP(mean): --  RR: 16 (21 Jun 2023 08:17) (10 - 19)  SpO2: 96% (21 Jun 2023 08:17) (94% - 99%)    Parameters below as of 21 Jun 2023 08:17  Patient On (Oxygen Delivery Method): room air    PHYSICAL EXAM-  GENERAL: morbid obesity, comfortable denies pain, no distress  HEAD:  Atraumatic, Normocephalic  EYES: EOMI, PERRLA, conjunctiva and sclera clear  ENMT:Moist mucous membranes, No lesions  NECK: Supple, No JVD, Normal thyroid  NERVOUS SYSTEM:  Alert & Oriented X 3, Moving all extremities, strength 5/5, c/o subjective weakness on right LE maurizio while ambulating, sensory intact  CHEST/LUNG: CTA bilaterally; No rales, rhonchi, wheezing, or rubs  HEART: Regular rate and rhythm; No murmurs, rubs, or gallops  ABDOMEN: Soft, Nontender, Nondistended; Bowel sounds present; Back- surg site C/D/I, HV drain in place minimal drainage  EXTREMITIES:  2+ Peripheral Pulses, No clubbing, cyanosis, or edema  SKIN: No rashes or lesions    MEDICATIONS  (STANDING):  acetaminophen     Tablet .. 1000 milliGRAM(s) Oral every 8 hours  gabapentin 300 milliGRAM(s) Oral at bedtime  hydrocortisone 1% Cream 1 Application(s) Topical two times a day  lactated ringers. 1000 milliLiter(s) (125 mL/Hr) IV Continuous <Continuous>  latanoprost 0.005% Ophthalmic Solution 1 Drop(s) Both EYES at bedtime  levothyroxine 50 MICROGram(s) Oral daily  loratadine 10 milliGRAM(s) Oral daily  polyethylene glycol 3350 17 Gram(s) Oral at bedtime  senna 2 Tablet(s) Oral at bedtime    MEDICATIONS  (PRN):  aluminum hydroxide/magnesium hydroxide/simethicone Suspension 30 milliLiter(s) Oral every 12 hours PRN Indigestion  diazepam    Tablet 5 milliGRAM(s) Oral every 12 hours PRN muscle spasm  HYDROmorphone   Tablet 4 milliGRAM(s) Oral every 3 hours PRN Severe Pain (7 - 10)  HYDROmorphone   Tablet 2 milliGRAM(s) Oral every 3 hours PRN Moderate Pain (4 - 6)  HYDROmorphone  Injectable 0.5 milliGRAM(s) IV Push every 3 hours PRN Breaktrough pain  meclizine 25 milliGRAM(s) Oral every 8 hours PRN Dizziness  ondansetron Injectable 4 milliGRAM(s) IV Push every 6 hours PRN Nausea and/or Vomiting      LABS:                        11.7   12.10 )-----------( 300      ( 21 Jun 2023 06:00 )             34.5     06-21    137  |  103  |  10  ----------------------------<  96  4.1   |  25  |  0.97    Ca    8.8      21 Jun 2023 06:00        Urinalysis Basic - ( 21 Jun 2023 06:00 )    Color: x / Appearance: x / SG: x / pH: x  Gluc: 96 mg/dL / Ketone: x  / Bili: x / Urobili: x   Blood: x / Protein: x / Nitrite: x   Leuk Esterase: x / RBC: x / WBC x   Sq Epi: x / Non Sq Epi: x / Bacteria: x

## 2023-06-21 NOTE — OCCUPATIONAL THERAPY INITIAL EVALUATION ADULT - ADL RETRAINING, OT EVAL
Patient will dress upper body with set-up within 2-3 sessions.  Patient will dress lower body I'ly , AE as needed within 2-3 sessions.

## 2023-06-21 NOTE — OCCUPATIONAL THERAPY INITIAL EVALUATION ADULT - ADDITIONAL COMMENTS
3 YOLI pt will stay on 1st floor +tub  PTA pt used a SAC  Pt owns a SAC, RW, hip kit, TTB commode Pt resides in pvt home with son. Pt states 3STE no HR, states will be residing on first floor upon d/c. Pt has RW and cane. Pt has tub. Pt reports was independent prior to admission with use of cane.  3 YOLI pt will stay on 1st floor +tub  PTA pt used a SAC  Pt owns a SAC, RW, hip kit, TTB commode

## 2023-06-21 NOTE — PROGRESS NOTE ADULT - SUBJECTIVE AND OBJECTIVE BOX
Orthopedic Spine Progress Note      POST OPERATIVE DAY #: 1  STATUS POST: TLIF L4-L5, L5-S1                Pre-Op Dx: Spondylolisthesis, lumbar region    DDD (degenerative disc disease), lumbosacral      Post-Op Dx:  Spondylolisthesis, lumbar region    DDD (degenerative disc disease), lumbosacral      SUBJECTIVE: Patient seen and examined. Pain much improved, Improved Bilateral lower extremity tingling, and weakness. pain is localized to the lower back     Pain (0-10): 2  Current Pain Management:  [ ] PCA   [x ] Po Analgesics [x ] IM /IV Anagesics     Vital Signs Last 24 Hrs  T(C): 36.7 (21 Jun 2023 08:17), Max: 37.3 (20 Jun 2023 22:15)  T(F): 98.1 (21 Jun 2023 08:17), Max: 99.2 (20 Jun 2023 22:15)  HR: 55 (21 Jun 2023 08:17) (54 - 74)  BP: 126/57 (21 Jun 2023 08:17) (120/55 - 162/69)  BP(mean): --  RR: 16 (21 Jun 2023 08:17) (10 - 19)  SpO2: 96% (21 Jun 2023 08:17) (94% - 99%)    Parameters below as of 21 Jun 2023 08:17  Patient On (Oxygen Delivery Method): room air      I&O's Detail    20 Jun 2023 07:01  -  21 Jun 2023 07:00  --------------------------------------------------------  IN:    Lactated Ringers: 1700 mL    Lactated Ringers: 1300 mL  Total IN: 3000 mL    OUT:    Accordian (mL): 275 mL  100ml overnight    Blood Loss (mL): 550 mL    Indwelling Catheter - Urethral (mL): 1825 mL  Total OUT: 2650 mL    Total NET: 350 mL      OBJECTIVE:         Wound /Dressing: I/C/D  Neurological: A/O x3               Sensation: [ x] intact to light touch          Motor exam: [x  ]            [x ] Lower ext.     Hip Flx  Hip Ext   Hip Abd  Hip Add Quad   Hamstrg   TA       EHL      GS                              R        5/5        5/5        5/5             5/5        5/5        4/5        5/5     5/5      5/5                              L         5/5        5/5        5/5             5/5        5/5        5/5        5/5     5/5      5/5                                                   HV intact on self suction    RADIOLOGY & ADDITIONAL STUDIES:                                               LABS:                        11.7   12.10 )-----------( 300      ( 21 Jun 2023 06:00 )             34.5     06-21    137  |  103  |  10  ----------------------------<  96  4.1   |  25  |  0.97    Ca    8.8      21 Jun 2023 06:00        Urinalysis Basic - ( 21 Jun 2023 06:00 )    Color: x / Appearance: x / SG: x / pH: x  Gluc: 96 mg/dL / Ketone: x  / Bili: x / Urobili: x   Blood: x / Protein: x / Nitrite: x   Leuk Esterase: x / RBC: x / WBC x   Sq Epi: x / Non Sq Epi: x / Bacteria: x      MEDICATIONS  (STANDING):  acetaminophen     Tablet .. 1000 milliGRAM(s) Oral every 8 hours  gabapentin 300 milliGRAM(s) Oral at bedtime  lactated ringers. 1000 milliLiter(s) (125 mL/Hr) IV Continuous <Continuous>  latanoprost 0.005% Ophthalmic Solution 1 Drop(s) Both EYES at bedtime  levothyroxine 50 MICROGram(s) Oral daily  loratadine 10 milliGRAM(s) Oral daily  polyethylene glycol 3350 17 Gram(s) Oral at bedtime  senna 2 Tablet(s) Oral at bedtime    MEDICATIONS  (PRN):  aluminum hydroxide/magnesium hydroxide/simethicone Suspension 30 milliLiter(s) Oral every 12 hours PRN Indigestion  diazepam    Tablet 5 milliGRAM(s) Oral every 12 hours PRN muscle spasm  HYDROmorphone   Tablet 2 milliGRAM(s) Oral every 3 hours PRN Moderate Pain (4 - 6)  HYDROmorphone   Tablet 4 milliGRAM(s) Oral every 3 hours PRN Severe Pain (7 - 10)  HYDROmorphone  Injectable 0.5 milliGRAM(s) IV Push every 3 hours PRN Breaktrough pain  meclizine 25 milliGRAM(s) Oral every 8 hours PRN Dizziness  ondansetron Injectable 4 milliGRAM(s) IV Push every 6 hours PRN Nausea and/or Vomiting      A/P :  67y Female   s/p:  TLIF L4-L5, L5-S1  -    Pain control:  PO dilaudid, Valium as needed  -    DVT ppx: SCDs    -    Liriano cath removed: trial of void   -    will monitor HV output for later in a day  -    Review labs as indicated     -    Physical Therapy: spine precautions, lumbar brace to be delivered   -    Weight bearing status: as tolerated   -    Dispo: Home [x ]     Rehab [ ]                                                                                       Orthopedic Spine Progress Note      POST OPERATIVE DAY #: 1  STATUS POST: TLIF L4-L5, L5-S1                Pre-Op Dx: Spondylolisthesis, lumbar region    DDD (degenerative disc disease), lumbosacral      Post-Op Dx:  Spondylolisthesis, lumbar region    DDD (degenerative disc disease), lumbosacral      SUBJECTIVE: Patient seen and examined. Pain much improved, Improved Bilateral lower extremity tingling, and weakness. pain is localized to the lower back     Pain (0-10): 2  Current Pain Management:  [ ] PCA   [x ] Po Analgesics [x ] IM /IV Anagesics     Vital Signs Last 24 Hrs  T(C): 36.7 (21 Jun 2023 08:17), Max: 37.3 (20 Jun 2023 22:15)  T(F): 98.1 (21 Jun 2023 08:17), Max: 99.2 (20 Jun 2023 22:15)  HR: 55 (21 Jun 2023 08:17) (54 - 74)  BP: 126/57 (21 Jun 2023 08:17) (120/55 - 162/69)  BP(mean): --  RR: 16 (21 Jun 2023 08:17) (10 - 19)  SpO2: 96% (21 Jun 2023 08:17) (94% - 99%)    Parameters below as of 21 Jun 2023 08:17  Patient On (Oxygen Delivery Method): room air      I&O's Detail    20 Jun 2023 07:01  -  21 Jun 2023 07:00  --------------------------------------------------------  IN:    Lactated Ringers: 1700 mL    Lactated Ringers: 1300 mL  Total IN: 3000 mL    OUT:    Accordian (mL): 275 mL  100ml overnight    Blood Loss (mL): 550 mL    Indwelling Catheter - Urethral (mL): 1825 mL  Total OUT: 2650 mL    Total NET: 350 mL      OBJECTIVE:         Wound /Dressing: I/C/D  Neurological: A/O x3               Sensation: [ x] intact to light touch          Motor exam: [x  ]            [x ] Lower ext.     Hip Flx  Hip Ext   Hip Abd  Hip Add Quad   Hamstrg   TA       EHL      GS                              R        5/5        5/5        5/5             5/5        5/5        4/5        5/5     5/5      5/5                              L         5/5        5/5        5/5             5/5        5/5        5/5        5/5     5/5      5/5                                                   HV intact on self suction    RADIOLOGY & ADDITIONAL STUDIES:                                               LABS:                        11.7   12.10 )-----------( 300      ( 21 Jun 2023 06:00 )             34.5     06-21    137  |  103  |  10  ----------------------------<  96  4.1   |  25  |  0.97    Ca    8.8      21 Jun 2023 06:00        Urinalysis Basic - ( 21 Jun 2023 06:00 )    Color: x / Appearance: x / SG: x / pH: x  Gluc: 96 mg/dL / Ketone: x  / Bili: x / Urobili: x   Blood: x / Protein: x / Nitrite: x   Leuk Esterase: x / RBC: x / WBC x   Sq Epi: x / Non Sq Epi: x / Bacteria: x      MEDICATIONS  (STANDING):  acetaminophen     Tablet .. 1000 milliGRAM(s) Oral every 8 hours  gabapentin 300 milliGRAM(s) Oral at bedtime  lactated ringers. 1000 milliLiter(s) (125 mL/Hr) IV Continuous <Continuous>  latanoprost 0.005% Ophthalmic Solution 1 Drop(s) Both EYES at bedtime  levothyroxine 50 MICROGram(s) Oral daily  loratadine 10 milliGRAM(s) Oral daily  polyethylene glycol 3350 17 Gram(s) Oral at bedtime  senna 2 Tablet(s) Oral at bedtime    MEDICATIONS  (PRN):  aluminum hydroxide/magnesium hydroxide/simethicone Suspension 30 milliLiter(s) Oral every 12 hours PRN Indigestion  diazepam    Tablet 5 milliGRAM(s) Oral every 12 hours PRN muscle spasm  HYDROmorphone   Tablet 2 milliGRAM(s) Oral every 3 hours PRN Moderate Pain (4 - 6)  HYDROmorphone   Tablet 4 milliGRAM(s) Oral every 3 hours PRN Severe Pain (7 - 10)  HYDROmorphone  Injectable 0.5 milliGRAM(s) IV Push every 3 hours PRN Breaktrough pain  meclizine 25 milliGRAM(s) Oral every 8 hours PRN Dizziness  ondansetron Injectable 4 milliGRAM(s) IV Push every 6 hours PRN Nausea and/or Vomiting      A/P :  67y Female   s/p:  TLIF L4-L5, L5-S1  -    Pain control:  PO dilaudid, Valium as needed  -    DVT ppx: SCDs    -    Liriano cath removed: trial of void   -    will monitor HV output for later in a day, off suction at 9:30. HV had approximately 30 cc prior to removing suction   -    Review labs as indicated     -    Physical Therapy: spine precautions, lumbar brace to be delivered   -    Weight bearing status: as tolerated   -    Dispo: Home [x ]     Rehab [ ]

## 2023-06-21 NOTE — CARE COORDINATION ASSESSMENT. - NSPASTMEDSURGHISTORY_GEN_ALL_CORE_FT
PAST MEDICAL & SURGICAL HISTORY:  Morbid obesity with BMI of 50.0-59.9, adult  BMI 51.5      Chronic venous insufficiency      Glaucoma      Hyperparathyroidism  being monitored by endocrinologist      Rosacea      Migraines      Allergic rhinitis      GERD (gastroesophageal reflux disease)      Raynauds disease      Hashimoto's thyroiditis      Vertigo      HTN (hypertension)      History of pterygium excision  left eye 2005      Tibialis posterior tendon rupture  repair and foot surgery  left 2012      S/P hysterectomy  2002      Status post trigger finger release  right thumb and middle finger 2011      S/P carpal tunnel release  right 2009      Cyst of salivary gland  excision 1989      Adenoma of left breast  excision 1997      S/P excision of neuroma  left ankle 1988      S/P ankle ligament repair  left ankle 1985      Osteoarthritis of right hip  left hip, hands      History of total hip replacement, left  2020      JOSE RAUL (obstructive sleep apnea)  On CPAP      History of total hip replacement, right  2020      Lumbar radiculopathy      Lumbar spinal stenosis      H/O basal cell carcinoma excision      Hypothyroidism      History of parathyroid surgery      S/P trigger finger release      S/P Mohs surgery for basal cell carcinoma

## 2023-06-21 NOTE — PATIENT CHOICE NOTE. - NSPTCHOICESTATE_GEN_ALL_CORE

## 2023-06-21 NOTE — CARE COORDINATION ASSESSMENT. - NSCAREPROVIDERS_GEN_ALL_CORE_FT
CARE PROVIDERS:  Administration: Anay Garcia  Administration: Luis Alfredo Tubbs  Administration: Neil León  Admitting: Jude Parra  Attending: Jude Parra  Case Management: Santaromana, Anna  Consultant: David Monroy  Consultant: Dee Eldridge  Consultant: Antonio Arroyo  Nurse: Iliana Sanchez  Occupational Therapy: Marissa Arnett  Ordered: Physician, Ordering  Ordered: Janusz Choi  Outpatient Provider: Alexandrea Eduardo  Outpatient Provider: Hamilton Kaba  Override: Jeanne Rodarte  PCA/Nursing Assistant: Mayra Dixon  PCA/Nursing Assistant: Janna Wilson  Physical Therapy: Solo Moscoso  Physical Therapy: Minerva Ponce  Primary Team: Lori Germain  Primary Team: Fernando Broderick  Primary Team: Gurjit Mccormick  Primary Team: Jo Dawn  Primary Team: Ron Juarez  Primary Team: Chris Bhardwaj  Primary Team: Latrice Schwartz  Primary Team: Hina Pitts  Registered Dietitian: Gina Monzon  Respiratory Therapy: Jayson Jackson  Team: FAISAL  Hospitalists, Team   CARE PROVIDERS:  Access Services: Marquis Hillman  Administration: Neil León  Administration: Luis Alfredo Tubbs  Administration: Anay Garcia  Admitting: Jude Parra  Attending: Jude Parra  Case Management: Santaromana, Anna  Consultant: David Monroy  Consultant: Dee Eldridge  Consultant: Antonio Arroyo  Nurse: Iliana Sanchez  Occupational Therapy: Marissa Arnett  Ordered: Physician, Ordering  Ordered: Janusz Choi  Outpatient Provider: Alexandrea Eduardo  Outpatient Provider: Hamilton Kaba  Override: Jeanne Rodarte  PCA/Nursing Assistant: Janna Wilson  Physical Therapy: Solo Moscoso  Physical Therapy: Minerva Ponce  Primary Team: Lori Germain  Primary Team: Fernando Broderick  Primary Team: Gurjit Mccormick  Primary Team: Jo Dawn  Primary Team: Ron Juarez  Primary Team: Chris Bhardwaj  Primary Team: Latrice Schwartz  Primary Team: Hina Pitts  Registered Dietitian: Gina Monzon  Respiratory Therapy: Jayson Jackson  Team: FAISAL  Hospitalists, Team

## 2023-06-22 ENCOUNTER — TRANSCRIPTION ENCOUNTER (OUTPATIENT)
Age: 68
End: 2023-06-22

## 2023-06-22 VITALS — OXYGEN SATURATION: 97 %

## 2023-06-22 PROCEDURE — 97116 GAIT TRAINING THERAPY: CPT

## 2023-06-22 PROCEDURE — 94660 CPAP INITIATION&MGMT: CPT

## 2023-06-22 PROCEDURE — C1713: CPT

## 2023-06-22 PROCEDURE — 94664 DEMO&/EVAL PT USE INHALER: CPT

## 2023-06-22 PROCEDURE — 88304 TISSUE EXAM BY PATHOLOGIST: CPT

## 2023-06-22 PROCEDURE — C1889: CPT

## 2023-06-22 PROCEDURE — 97530 THERAPEUTIC ACTIVITIES: CPT

## 2023-06-22 PROCEDURE — 97161 PT EVAL LOW COMPLEX 20 MIN: CPT

## 2023-06-22 PROCEDURE — 99232 SBSQ HOSP IP/OBS MODERATE 35: CPT

## 2023-06-22 PROCEDURE — 36415 COLL VENOUS BLD VENIPUNCTURE: CPT

## 2023-06-22 PROCEDURE — 80048 BASIC METABOLIC PNL TOTAL CA: CPT

## 2023-06-22 PROCEDURE — 72100 X-RAY EXAM L-S SPINE 2/3 VWS: CPT

## 2023-06-22 PROCEDURE — 76000 FLUOROSCOPY <1 HR PHYS/QHP: CPT

## 2023-06-22 PROCEDURE — 97165 OT EVAL LOW COMPLEX 30 MIN: CPT

## 2023-06-22 PROCEDURE — 97535 SELF CARE MNGMENT TRAINING: CPT

## 2023-06-22 PROCEDURE — 85025 COMPLETE CBC W/AUTO DIFF WBC: CPT

## 2023-06-22 RX ORDER — ACETAMINOPHEN 500 MG
2 TABLET ORAL
Qty: 0 | Refills: 0 | DISCHARGE
Start: 2023-06-22

## 2023-06-22 RX ORDER — POLYETHYLENE GLYCOL 3350 17 G/17G
17 POWDER, FOR SOLUTION ORAL
Qty: 0 | Refills: 0 | DISCHARGE
Start: 2023-06-22

## 2023-06-22 RX ORDER — DIAZEPAM 5 MG
1 TABLET ORAL
Qty: 10 | Refills: 0
Start: 2023-06-22

## 2023-06-22 RX ORDER — NALOXONE HYDROCHLORIDE 4 MG/.1ML
4 SPRAY NASAL
Qty: 1 | Refills: 0
Start: 2023-06-22

## 2023-06-22 RX ORDER — IBUPROFEN 200 MG
1 TABLET ORAL
Qty: 0 | Refills: 0 | DISCHARGE

## 2023-06-22 RX ORDER — SENNA PLUS 8.6 MG/1
2 TABLET ORAL
Qty: 0 | Refills: 0 | DISCHARGE
Start: 2023-06-22

## 2023-06-22 RX ORDER — CHOLECALCIFEROL (VITAMIN D3) 125 MCG
1 CAPSULE ORAL
Qty: 0 | Refills: 0 | DISCHARGE

## 2023-06-22 RX ORDER — ACETAMINOPHEN 500 MG
2 TABLET ORAL
Qty: 0 | Refills: 0 | DISCHARGE

## 2023-06-22 RX ORDER — HYDROMORPHONE HYDROCHLORIDE 2 MG/ML
1 INJECTION INTRAMUSCULAR; INTRAVENOUS; SUBCUTANEOUS
Qty: 20 | Refills: 0
Start: 2023-06-22 | End: 2023-06-28

## 2023-06-22 RX ADMIN — Medication 1 APPLICATION(S): at 06:00

## 2023-06-22 RX ADMIN — Medication 50 MICROGRAM(S): at 05:57

## 2023-06-22 RX ADMIN — HYDROMORPHONE HYDROCHLORIDE 4 MILLIGRAM(S): 2 INJECTION INTRAMUSCULAR; INTRAVENOUS; SUBCUTANEOUS at 10:00

## 2023-06-22 RX ADMIN — LORATADINE 10 MILLIGRAM(S): 10 TABLET ORAL at 13:57

## 2023-06-22 RX ADMIN — HYDROMORPHONE HYDROCHLORIDE 4 MILLIGRAM(S): 2 INJECTION INTRAMUSCULAR; INTRAVENOUS; SUBCUTANEOUS at 05:50

## 2023-06-22 RX ADMIN — LOSARTAN POTASSIUM 100 MILLIGRAM(S): 100 TABLET, FILM COATED ORAL at 05:57

## 2023-06-22 RX ADMIN — Medication 1000 MILLIGRAM(S): at 14:15

## 2023-06-22 RX ADMIN — HYDROMORPHONE HYDROCHLORIDE 4 MILLIGRAM(S): 2 INJECTION INTRAMUSCULAR; INTRAVENOUS; SUBCUTANEOUS at 04:58

## 2023-06-22 RX ADMIN — HYDROMORPHONE HYDROCHLORIDE 4 MILLIGRAM(S): 2 INJECTION INTRAMUSCULAR; INTRAVENOUS; SUBCUTANEOUS at 09:04

## 2023-06-22 RX ADMIN — Medication 1000 MILLIGRAM(S): at 06:00

## 2023-06-22 RX ADMIN — ONDANSETRON 4 MILLIGRAM(S): 8 TABLET, FILM COATED ORAL at 10:19

## 2023-06-22 RX ADMIN — Medication 1000 MILLIGRAM(S): at 05:57

## 2023-06-22 NOTE — DISCHARGE NOTE PROVIDER - NSDCCPTREATMENT_GEN_ALL_CORE_FT
PRINCIPAL PROCEDURE  Procedure: Fusion, spine, lumbar, TLIF, 1-2 levels  Findings and Treatment: L4-L5, L5-S1

## 2023-06-22 NOTE — DISCHARGE NOTE PROVIDER - NSDCFUADDINST_GEN_ALL_CORE_FT
- Call your doctor if you experience:  • An increase in pain not controlled by pain medication or change in activity or  position.  • Temperature greater than 101° F.  • Redness, increased swelling or foul smelling drainage from or around the  incision.  • Numbness, tingling or a change in color or temperature of the operative leg.  • Call your doctor immediately if you experience chest pain, shortness of breath or calf pain.  For Constipation :   • Increase your water intake. Drink at least 8 glasses of water daily.  • Try adding fiber to your diet by eating fruits, vegetables and foods that are rich in grains.  • If you do experience constipation, you may take an over-the-counter stool softener/laxative such as Verenice Colace, Senekot or  Milk of Magnesia.

## 2023-06-22 NOTE — DISCHARGE NOTE PROVIDER - NSDCCPCAREPLAN_GEN_ALL_CORE_FT
PRINCIPAL DISCHARGE DIAGNOSIS  Diagnosis: Lumbar spinal stenosis  Assessment and Plan of Treatment: No heavy lifting. Do not lift more than 10 pounds.  Avoid twisting and bending over. Do NOT drive a car.  You may be driven in a car.  You may shower if the dressing is the clear plastic type or if you cover the existing dressing with plastic and tape to prevent it from getting wet.  Change dressing with dry, sterile gauze and tape if it becomes loose, wet or soiled.    Observe low back precautions as described by the Physical Therapist.  Walking is your best exercise.  It is best not to remain in one position for long periods such as long car rides. Call the doctor with questions, fevers, severe headache, For Constipation :   • Increase your water intake. Drink at least 8 glasses of water daily.  • Try adding fiber to your diet by eating fruits, vegetables and foods that are rich in grains.  • If you do experience constipation, you may take an over-the-counter stool softener/laxative such as Verenice Colace, Senekot or  Milk of Magnesia. or bladder dysfunction, new numbness/weakness or new pain not relieved by medication.

## 2023-06-22 NOTE — DISCHARGE NOTE PROVIDER - NSDCMRMEDTOKEN_GEN_ALL_CORE_FT
acetaminophen 500 mg oral tablet: 2 tab(s) orally every 8 hours  Avapro 300 mg oral tablet: 1 tab(s) orally once a day  Dilaudid 2 mg oral tablet: 1 tab(s) orally every 4 hours as needed for  severe pain MDD: 6  Fish Oil 1000 mg oral capsule: 1 cap(s) orally once a day  latanoprost 0.005% ophthalmic solution: 1 drop(s) to each affected eye once a day (in the evening)  Lumbar Brace: wear when out of bed for comfort and support,  s/p L4-5 and L5-S1 TLIF  meclizine 25 mg oral tablet: orally every 8 hours, As Needed  Mounjaro 5 mg/0.5 mL subcutaneous solution: 5 subcutaneously  Multi-Day Plus Minerals oral tablet: 1 tab(s) orally once a day  naloxone 4 mg/0.1 mL nasal spray: 4 milligram(s) intranasally once apply to one nostril, if no change in cognition, may repeat into another nostril  Neurontin 300 mg oral capsule: 1 orally once a day (at bedtime)  Opzelura 1.5% topical cream: Apply topically to affected area 2 times a day  Pataday 0.2% ophthalmic solution: 1 drop(s) to each affected eye once a day, As Needed  polyethylene glycol 3350 oral powder for reconstitution: 17 gram(s) orally once a day (at bedtime)  senna leaf extract oral tablet: 2 tab(s) orally once a day (at bedtime)  spironolactone 50 mg oral tablet: 1 tab(s) orally once a day  Synthroid 100 mcg (0.1 mg) oral tablet: 1 tab(s) orally once a week 7th day   Synthroid 50 mcg (0.05 mg) oral tablet: 1 tab(s) orally once a day for 6  days   Turmeric 500 mg oral capsule: 2 cap(s) orally once a day  Valium 5 mg oral tablet: 1 tab(s) orally every 8 hours as needed for  muscle spasm MDD: 3  ZyrTEC 10 mg oral tablet: 1 tab(s) orally once a day

## 2023-06-22 NOTE — DISCHARGE NOTE PROVIDER - CARE PROVIDER_API CALL
Jude Parra [facility]  Spine Surgery  [address], [address]  [place] [address]  Phone: [phone]  Fax: [phone]  Scheduled Appointment: 07/05/2023 02:45 PM

## 2023-06-22 NOTE — PROGRESS NOTE ADULT - SUBJECTIVE AND OBJECTIVE BOX
Discharge medication calendar:  Eliquis 2.5mg q12h x 4 weeks  Omeprazole 20mg QAM x 4 weeks  Celecoxib 200mg q12h x 2-3 weeks  APAP 1000mg q8h x 2-3 weeks  Narcotic PRN  Docusate 100mg TID while taking narcotic  Miralax, Senna, or Bisacodyl PRN for treatment of constipation

## 2023-06-22 NOTE — PROGRESS NOTE ADULT - SUBJECTIVE AND OBJECTIVE BOX
Orthopedic Spine Progress Note      POST OPERATIVE DAY #: 2  STATUS POST:   TLIF L4-5 L5-S1              Pre-Op Dx: Spondylolisthesis, lumbar region    DDD (degenerative disc disease), lumbosacral      Post-Op Dx:  DDD (degenerative disc disease), lumbosacral    Spondylolisthesis, lumbar region      Prin. Procedure:  TLIF L4-5 L5-S1     SUBJECTIVE: Patient seen and examined. c/o low back pain without radiation to legs, RLE strength feels improved compared to yesterday, left foot numbness improving.  No HA, CP, PONV or SOB.    Pain (0-10): 3/10  Current Pain Management:  Po Analgesics IV Anagesics     Vital Signs Last 24 Hrs  T(C): 37.1 (21 Jun 2023 23:30), Max: 37.1 (21 Jun 2023 23:30)  T(F): 98.7 (21 Jun 2023 23:30), Max: 98.7 (21 Jun 2023 23:30)  HR: 76 (22 Jun 2023 06:10) (55 - 76)  BP: 139/69 (21 Jun 2023 23:30) (109/63 - 139/69)  BP(mean): --  RR: 18 (21 Jun 2023 23:30) (16 - 18)  SpO2: 90% (22 Jun 2023 06:10) (90% - 96%)    Parameters below as of 21 Jun 2023 15:53  Patient On (Oxygen Delivery Method): room air      I&O's Detail    21 Jun 2023 07:01  -  22 Jun 2023 07:00  --------------------------------------------------------  IN:  Total IN: 0 mL    OUT:    Accordian (mL): discontinued    Voided (mL): 300 mL  Total OUT: 390 mL    Total NET: -390 mL          OBJECTIVE:         Wound /Dressing: Clean and dry      Neurological: A/O x  3             Sensation:  decreased slightly at alteral left foot, subjectively improved form preop          Motor exam:                 Lower ext.                 Hip Flx   Quad     TA       EHL      GS                              R        5/5        5/5        5/5             5/5        5/5                                   L         5/5        5/5        5/5             5/5        5/5                                                               Vascular :  + pedal pulses      RADIOLOGY & ADDITIONAL STUDIES:                                               LABS:                        11.7   12.10 )-----------( 300      ( 21 Jun 2023 06:00 )             34.5     06-21    137  |  103  |  10  ----------------------------<  96  4.1   |  25  |  0.97    Ca    8.8      21 Jun 2023 06:00        Urinalysis Basic - ( 21 Jun 2023 06:00 )    Color: x / Appearance: x / SG: x / pH: x  Gluc: 96 mg/dL / Ketone: x  / Bili: x / Urobili: x   Blood: x / Protein: x / Nitrite: x   Leuk Esterase: x / RBC: x / WBC x   Sq Epi: x / Non Sq Epi: x / Bacteria: x          A/P :  67y Female   s/p:  TLIF L4-5 L5-S1  -    Preop symptoms gradually improving, RLE improving   -    Pain control adequate  -    DVT ppx: SCDs    -    Abx: complete   -    Review labs as indicated     -    Physical Therapy, Occupational therapy- low back protocols  -    Dispo: Home today if cleared by PT

## 2023-06-22 NOTE — DISCHARGE NOTE PROVIDER - NSDCFUSCHEDAPPT_GEN_ALL_CORE_FT
Jude Parra  [facility]  ORTHOSURG [address]  Scheduled Appointment: 07/05/2023

## 2023-06-22 NOTE — DISCHARGE NOTE NURSING/CASE MANAGEMENT/SOCIAL WORK - PATIENT PORTAL LINK FT
You can access the FollowMyHealth Patient Portal offered by [facility] by registering at the following website: [email or web address]. By joining [facility]’s FollowMyHealth portal, you will also be able to view your health information using other applications (apps) compatible with our system.

## 2023-06-22 NOTE — DISCHARGE NOTE PROVIDER - HOSPITAL COURSE
This patient was admitted to [facility] with a history of severe degenerative joint disease, spinal stenosis of the lumbars spine.  Patient went to Pre-Surgical Testing at [facility] and was medically cleared to undergo elective procedure. Patient underwent TLIF L4-L5, L5-S1 by  on 6/20/2023.  No operative or serge-operative complications arose during patients hospital course.  Patient received antibiotic according to SCIP guidelines for infection prevention.  SCDs was given for DVT prophylaxis.  Anesthesia, Medical Hospitalist, Physical Therapy and Occupational Therapy were consulted. Patient is stable for discharge with a good prognosis.  Appropriate discharge instructions and medications are provided in this document.

## 2023-06-22 NOTE — DISCHARGE NOTE PROVIDER - NSDCHHNEEDSERVICE_GEN_ALL_CORE
Regular rate and rhythm, Heart sounds S1 S2 present, no murmurs, rubs or gallops Rehabilitation services

## 2023-06-22 NOTE — PROGRESS NOTE ADULT - ASSESSMENT
67 yr old with PMh of HTN, HLD, morbid obesity, hashimotos, reynauds, hyperparathyroid, GERD, OA, hypothyroidism    # s/p TLIF L4-L5, L5-S1     POD # 1  HV minimal drainage  WB status, DVT prophylax, Pain meds, IVF, Bowel regimen- per Surg  Inc spirometry  AMbulate   PT/ OT  pain controlled  Lumbar brace    # Reactive leucocytosis  no e/o inf process    # Thyroid issues, HTN  resume avapro on POD#2  resume spironolactone on POD#2  not on meds for thyroid    # Severe Obesity, Prediabetes  resume Mounjaro upon discharge  monitor glucose on BMP    # JOSE RAUL  reviewed pulm preop -> autopap 6/16 or CPAP @ 13 if autopap not available  CPAP QHS    # Glaucoma  resume eye drops    Medically stable for discharge
67 yr old with PMh of HTN, HLD, morbid obesity, hashimotos, reynauds, hyperparathyroid, GERD, OA, hypothyroidism    # s/p TLIF L4-L5, L5-S1     POD # 1  HV minimal drainage  WB status, DVT prophylax, Pain meds, IVF, Bowel regimen- per Surg  Inc spirometry  AMbulate   PT/ OT  pain controlled  Lumbar brace    # Reactive leucocytosis  no e/o inf process    # Thyroid issues, HTN  - resume avapro on POD#2  - resume spironolactone on POD#2  - not on meds for thyroid    # Severe Obesity, Prediabetes  - resume Mounjaro upon discharge  - monitor glucose on BMP    # JOSE RAUL  - reviewed pulm preop -> autopap 6/16 or CPAP @ 13 if autopap not available  - CPAP QHS    # Glaucoma  - resume eye drops    Medically stable for discharge

## 2023-06-22 NOTE — PROGRESS NOTE ADULT - SUBJECTIVE AND OBJECTIVE BOX
Radha Cantu M.D.    Patient is a 67y old  Female who presents with a chief complaint of Low back pain, leg numbness and pain (22 Jun 2023 07:21)      SUBJECTIVE / OVERNIGHT EVENTS: reports horse voice since intubation. Also endorsing to some post nasal drip. Denies SOB.     Patient denies chest pain, SOB, abd pain, N/V, fever, chills, dysuria or any other complaints. All remainder ROS negative.     MEDICATIONS  (STANDING):  acetaminophen     Tablet .. 1000 milliGRAM(s) Oral every 8 hours  gabapentin 300 milliGRAM(s) Oral at bedtime  hydrocortisone 1% Cream 1 Application(s) Topical two times a day  lactated ringers. 1000 milliLiter(s) (125 mL/Hr) IV Continuous <Continuous>  latanoprost 0.005% Ophthalmic Solution 1 Drop(s) Both EYES at bedtime  levothyroxine 50 MICROGram(s) Oral daily  loratadine 10 milliGRAM(s) Oral daily  losartan 100 milliGRAM(s) Oral daily  polyethylene glycol 3350 17 Gram(s) Oral at bedtime  senna 2 Tablet(s) Oral at bedtime    MEDICATIONS  (PRN):  aluminum hydroxide/magnesium hydroxide/simethicone Suspension 30 milliLiter(s) Oral every 12 hours PRN Indigestion  diazepam    Tablet 5 milliGRAM(s) Oral every 12 hours PRN muscle spasm  HYDROmorphone   Tablet 4 milliGRAM(s) Oral every 3 hours PRN Severe Pain (7 - 10)  HYDROmorphone   Tablet 2 milliGRAM(s) Oral every 3 hours PRN Moderate Pain (4 - 6)  HYDROmorphone  Injectable 0.5 milliGRAM(s) IV Push every 3 hours PRN Breaktrough pain  meclizine 25 milliGRAM(s) Oral every 8 hours PRN Dizziness  ondansetron Injectable 4 milliGRAM(s) IV Push every 6 hours PRN Nausea and/or Vomiting      I&O's Summary    21 Jun 2023 07:01  -  22 Jun 2023 07:00  --------------------------------------------------------  IN: 0 mL / OUT: 390 mL / NET: -390 mL        PHYSICAL EXAM:  Vital Signs Last 24 Hrs  T(C): 36.8 (22 Jun 2023 07:58), Max: 37.1 (21 Jun 2023 23:30)  T(F): 98.3 (22 Jun 2023 07:58), Max: 98.7 (21 Jun 2023 23:30)  HR: 57 (22 Jun 2023 07:58) (57 - 76)  BP: 129/81 (22 Jun 2023 07:58) (109/63 - 139/69)  BP(mean): --  RR: 18 (22 Jun 2023 07:58) (18 - 18)  SpO2: 91% (22 Jun 2023 07:58) (90% - 96%)    Parameters below as of 22 Jun 2023 07:58  Patient On (Oxygen Delivery Method): room air        CONSTITUTIONAL: NAD, well-groomed  RESPIRATORY: Normal respiratory effort; lungs are clear to auscultation bilaterally  CARDIOVASCULAR: Regular rate and rhythm; No lower extremity edema  ABDOMEN: Nontender to palpation, normoactive bowel sounds  PSYCH: A+O x3; affect appropriate  NEUROLOGY: CN 2-12 are intact and symmetric; no gross sensory deficits;   SKIN: No rashes; no palpable lesions    LABS:                        11.7   12.10 )-----------( 300      ( 21 Jun 2023 06:00 )             34.5     06-21    137  |  103  |  10  ----------------------------<  96  4.1   |  25  |  0.97    Ca    8.8      21 Jun 2023 06:00            Urinalysis Basic - ( 21 Jun 2023 06:00 )    Color: x / Appearance: x / SG: x / pH: x  Gluc: 96 mg/dL / Ketone: x  / Bili: x / Urobili: x   Blood: x / Protein: x / Nitrite: x   Leuk Esterase: x / RBC: x / WBC x   Sq Epi: x / Non Sq Epi: x / Bacteria: x        CAPILLARY BLOOD GLUCOSE          RADIOLOGY & ADDITIONAL TESTS:  Results Reviewed:   Imaging Personally Reviewed:  Electrocardiogram Personally Reviewed:

## 2023-06-22 NOTE — DISCHARGE NOTE NURSING/CASE MANAGEMENT/SOCIAL WORK - NSDCPEFALRISK_GEN_ALL_CORE
For information on Fall & Injury Prevention, visit: [email or web address] OR  [email or web address] OR  https://www.cdc.gov/steadi/patient.html

## 2023-06-28 RX ORDER — HYDROMORPHONE HYDROCHLORIDE 2 MG/1
2 TABLET ORAL
Qty: 30 | Refills: 0 | Status: ACTIVE | COMMUNITY
Start: 2023-06-28 | End: 1900-01-01

## 2023-06-28 RX ORDER — DIAZEPAM 5 MG/1
5 TABLET ORAL
Qty: 20 | Refills: 0 | Status: ACTIVE | COMMUNITY
Start: 2023-06-28 | End: 1900-01-01

## 2023-07-05 ENCOUNTER — APPOINTMENT (OUTPATIENT)
Dept: ORTHOPEDIC SURGERY | Facility: CLINIC | Age: 68
End: 2023-07-05
Payer: COMMERCIAL

## 2023-07-05 VITALS
HEIGHT: 62 IN | WEIGHT: 276 LBS | HEART RATE: 75 BPM | SYSTOLIC BLOOD PRESSURE: 157 MMHG | DIASTOLIC BLOOD PRESSURE: 79 MMHG | BODY MASS INDEX: 50.79 KG/M2

## 2023-07-05 PROCEDURE — 99024 POSTOP FOLLOW-UP VISIT: CPT

## 2023-07-05 PROCEDURE — 72100 X-RAY EXAM L-S SPINE 2/3 VWS: CPT

## 2023-07-05 RX ORDER — HYDROMORPHONE HYDROCHLORIDE 2 MG/1
2 TABLET ORAL
Qty: 30 | Refills: 0 | Status: ACTIVE | COMMUNITY
Start: 2023-07-05 | End: 1900-01-01

## 2023-07-06 PROBLEM — Z98.890 OTHER SPECIFIED POSTPROCEDURAL STATES: Chronic | Status: ACTIVE | Noted: 2023-06-12

## 2023-07-08 NOTE — HISTORY OF PRESENT ILLNESS
[___ Weeks Post Op] : [unfilled] weeks post op [Doing Well] : is doing well [No Sign of Infection] : is showing no signs of infection [Adequate Pain Control] : has adequate pain control [Steri-Strips Removed & Replaced] : steri-strips removed and replaced [Limited ADLs] : to participate in activities of daily living with limitations [No Work] : not to work [No Housework] : not to do housework [No Sports] : not to participate in sports [7] : the patient reports pain that is 7/10 in severity [Chills] : no chills [Constipation] : no constipation [Diarrhea] : no diarrhea [Dysuria] : no dysuria [Fever] : no fever [Nausea] : no nausea [Vomiting] : no vomiting [Clean/Dry/Intact] : clean, dry and intact [Erythema] : not erythematous [Discharge] : absent of discharge [Swelling] : not swollen [Dehiscence] : not dehisced [Vascular Intact] : ~T peripheral vascular exam normal [Negative Chalo's] : maneuvers demonstrated a negative Chalo's sign [de-identified] : Posterior lumbar fusion/laminectomy 6/20/2023 [de-identified] : Patient is here today for her first post operative office visit. Overall coming along. Patient wearing TLSO brace taking dilaudid valium gabapentin tylenol and using walker. Pain ranges from a 6 through a 10 depending on her activity level [de-identified] : AP and lateral x-ray of the lumbar spine obtained today demonstrate pedicle screw gia construct from L4-S1 in good position.  Interbody cages are seen at L4-5 and L5-S1.  No implant migration or loosening noted.  Partially visualized bilateral hip arthroplasty implants. [de-identified] : obese\par walker\par 5/5 EHL, ankle DF, PF bilaterally.  Grossly intact light touch sensation bilateral lower extremities.  Unable to elicit knee jerk and ankle jerk reflexes bilaterally. [de-identified] : Patient is seen for her first postoperative visit.  Implants from L4-S1 appear in good position with no implant loosening or migration noted.  At this time sutures were removed and Steri-Strips applied.  Patient was encouraged to increase her activities as tolerated but continue spine precautions with no bending twisting or lifting.  Celebrex was prescribed for her today.  Hydromorphone will be refilled for her.  Recommend she discontinue Valium and prescribed her methocarbamol instead.  She will continue use of a brace at this time.\par \par I will see her back in 1 month for follow-up with repeat x-rays.

## 2023-07-14 NOTE — H&P PST ADULT - BIRTH SEX
[facility]  [address] [place] [id number]  Dept: [phone]      Physical Therapy Discharge/Discontinuation Note     Referring MD: Marcus Noble MD    Patient has been discharged from therapy based on  no return by patient . Patient was  last seen for PT services on 5/19/2023. At that time, the patient appeared to be progressing well with therapy treatment. Therapy goals were met. Please see previous notes for objective findings. F/u call was made on 6/21/2023 and mother reported patient's knee symptoms appeared to have returned and she wanted to resume therapy however could not schedule at present. She was given our [address] number to schedule when ready. It has been several weeks and the patient has not been scheduled to resume therapy so I will assume she is unable to pursue PT at this time and will discharge. Female

## 2023-07-21 ENCOUNTER — NON-APPOINTMENT (OUTPATIENT)
Age: 68
End: 2023-07-21

## 2023-08-07 ENCOUNTER — APPOINTMENT (OUTPATIENT)
Dept: ORTHOPEDIC SURGERY | Facility: CLINIC | Age: 68
End: 2023-08-07
Payer: COMMERCIAL

## 2023-08-07 VITALS — SYSTOLIC BLOOD PRESSURE: 150 MMHG | DIASTOLIC BLOOD PRESSURE: 70 MMHG | HEART RATE: 76 BPM

## 2023-08-07 PROCEDURE — 72100 X-RAY EXAM L-S SPINE 2/3 VWS: CPT

## 2023-08-07 PROCEDURE — 99024 POSTOP FOLLOW-UP VISIT: CPT

## 2023-08-07 RX ORDER — HYDROMORPHONE HYDROCHLORIDE 2 MG/1
2 TABLET ORAL
Qty: 42 | Refills: 0 | Status: ACTIVE | COMMUNITY
Start: 2023-07-21

## 2023-08-07 RX ORDER — HYDROMORPHONE HYDROCHLORIDE 2 MG/1
2 TABLET ORAL
Qty: 28 | Refills: 0 | Status: ACTIVE | COMMUNITY
Start: 2023-08-07 | End: 1900-01-01

## 2023-08-07 NOTE — HISTORY OF PRESENT ILLNESS
[___ Months Post Op] : [unfilled] months post op [6] : the patient reports pain that is 6/10 in severity [Clean/Dry/Intact] : clean, dry and intact [Vascular Intact] : ~T peripheral vascular exam normal [Negative Chalo's] : maneuvers demonstrated a negative Chalo's sign [Doing Well] : is doing well [Excellent Pain Control] : has excellent pain control [No Sign of Infection] : is showing no signs of infection [None] : None [Chills] : no chills [Fever] : no fever [Erythema] : not erythematous [Discharge] : absent of discharge [Swelling] : not swollen [Dehiscence] : not dehisced [de-identified] : S/P Posterior Lumbar fusion/laminectomy DOS 6/20/23 L4-S1 [de-identified] : Patient presents here today for reevaluation of low back pain postoperatively. Patient uses rolling walker for support and wears back brace at all times. Patient states pain level today is 6/10 and worsens towards end of day and with sit to stand repositing.  Patient also verbalizes relief from Dilaudid 2mg as needed with effectiveness. [de-identified] : minimal erythema along incision reactive  5/5 EHL, ankle DF, PF - SLR absent KJ, aJ reflexes [de-identified] : AP and lateral image of the lumbar spine demonstrates pedicle screw and gia construct from L4-S1 in good position.  No implant loosening noted.  Bilateral hip arthroplasties are partially visualized.  No significant scoliosis and normal lumbar lordosis. [de-identified] : wean off walker wean off LSO Start outpatient PT, prescription provided Recommended she wean off the Dilaudid as well.   renew dilaudid today Rx for celebrex anfd methocarbamol provided f/u in 6 weeks

## 2023-09-18 ENCOUNTER — APPOINTMENT (OUTPATIENT)
Dept: ORTHOPEDIC SURGERY | Facility: CLINIC | Age: 68
End: 2023-09-18
Payer: COMMERCIAL

## 2023-09-18 VITALS — HEIGHT: 62 IN

## 2023-09-18 PROCEDURE — 72100 X-RAY EXAM L-S SPINE 2/3 VWS: CPT

## 2023-09-18 PROCEDURE — 99024 POSTOP FOLLOW-UP VISIT: CPT

## 2023-10-02 ENCOUNTER — APPOINTMENT (OUTPATIENT)
Dept: ORTHOPEDIC SURGERY | Facility: CLINIC | Age: 68
End: 2023-10-02
Payer: COMMERCIAL

## 2023-10-02 VITALS — SYSTOLIC BLOOD PRESSURE: 137 MMHG | HEART RATE: 61 BPM | DIASTOLIC BLOOD PRESSURE: 81 MMHG | HEIGHT: 62 IN

## 2023-10-02 PROCEDURE — 99213 OFFICE O/P EST LOW 20 MIN: CPT

## 2023-10-02 PROCEDURE — 72100 X-RAY EXAM L-S SPINE 2/3 VWS: CPT

## 2023-11-06 ENCOUNTER — NON-APPOINTMENT (OUTPATIENT)
Age: 68
End: 2023-11-06

## 2023-11-06 ENCOUNTER — APPOINTMENT (OUTPATIENT)
Dept: ORTHOPEDIC SURGERY | Facility: CLINIC | Age: 68
End: 2023-11-06
Payer: COMMERCIAL

## 2023-11-06 VITALS
SYSTOLIC BLOOD PRESSURE: 176 MMHG | WEIGHT: 265 LBS | DIASTOLIC BLOOD PRESSURE: 76 MMHG | HEIGHT: 62 IN | BODY MASS INDEX: 48.76 KG/M2 | HEART RATE: 62 BPM

## 2023-11-06 DIAGNOSIS — E66.01 MORBID (SEVERE) OBESITY DUE TO EXCESS CALORIES: ICD-10-CM

## 2023-11-06 PROCEDURE — 99213 OFFICE O/P EST LOW 20 MIN: CPT

## 2023-11-06 RX ORDER — METHOCARBAMOL 500 MG/1
500 TABLET, FILM COATED ORAL
Qty: 60 | Refills: 2 | Status: ACTIVE | COMMUNITY
Start: 2023-07-05 | End: 1900-01-01

## 2023-12-04 ENCOUNTER — APPOINTMENT (OUTPATIENT)
Dept: ORTHOPEDIC SURGERY | Facility: CLINIC | Age: 68
End: 2023-12-04
Payer: COMMERCIAL

## 2023-12-04 VITALS
SYSTOLIC BLOOD PRESSURE: 170 MMHG | BODY MASS INDEX: 46.38 KG/M2 | WEIGHT: 252 LBS | HEART RATE: 73 BPM | HEIGHT: 62 IN | DIASTOLIC BLOOD PRESSURE: 70 MMHG

## 2023-12-04 DIAGNOSIS — M51.37 OTHER INTERVERTEBRAL DISC DEGENERATION, LUMBOSACRAL REGION: ICD-10-CM

## 2023-12-04 PROCEDURE — 99214 OFFICE O/P EST MOD 30 MIN: CPT

## 2023-12-04 PROCEDURE — 72110 X-RAY EXAM L-2 SPINE 4/>VWS: CPT

## 2024-01-26 NOTE — H&P PST ADULT - NS MD HP INPLANTS MED DEV
Requested Prescriptions   Pending Prescriptions Disp Refills    topiramate (TOPAMAX) 50 MG tablet [Pharmacy Med Name: topiramate 50 mg tablet] 60 tablet 3     Sig: Take 1 tablet (50 mg) by mouth 2 times daily       Anti-Seizure Meds Protocol  Failed - 1/26/2024  8:04 AM        Failed - Review Authorizing provider's last note.      Refer to last progress notes: confirm request is for original authorizing provider (cannot be through other providers).          Failed - Normal CBC on file in past 26 months     Recent Labs   Lab Test 12/21/23  1123   WBC 18.6*   RBC 4.24   HGB 12.3   HCT 40.0                    Passed - Recent (12 mo) or future (30 days) visit within the authorizing provider's specialty     The patient must have completed an in-person or virtual visit within the past 12 months or has a future visit scheduled within the next 90 days with the authorizing provider s specialty.  Urgent care and e-visits do not quality as an office visit for this protocol.          Passed - Normal ALT or AST on file in past 26 months     Recent Labs   Lab Test 08/02/23  1607   ALT 68*     Recent Labs   Lab Test 08/02/23  1607   AST 33             Passed - Normal platelet count on file in past 26 months     Recent Labs   Lab Test 12/21/23  1123                  Passed - Medication is active on med list        Passed - No active pregnancy on record        Passed - No positive pregnancy test in last 12 months             
left hip, 2 screws left heel/Artificial joint

## 2024-01-31 ENCOUNTER — APPOINTMENT (OUTPATIENT)
Dept: ORTHOPEDIC SURGERY | Facility: CLINIC | Age: 69
End: 2024-01-31
Payer: OTHER MISCELLANEOUS

## 2024-01-31 VITALS
DIASTOLIC BLOOD PRESSURE: 79 MMHG | SYSTOLIC BLOOD PRESSURE: 141 MMHG | BODY MASS INDEX: 45.75 KG/M2 | HEIGHT: 62.5 IN | HEART RATE: 71 BPM | WEIGHT: 255 LBS

## 2024-01-31 PROCEDURE — 72110 X-RAY EXAM L-2 SPINE 4/>VWS: CPT

## 2024-01-31 PROCEDURE — 96372 THER/PROPH/DIAG INJ SC/IM: CPT

## 2024-01-31 PROCEDURE — 72170 X-RAY EXAM OF PELVIS: CPT

## 2024-01-31 PROCEDURE — 99214 OFFICE O/P EST MOD 30 MIN: CPT | Mod: 25

## 2024-01-31 RX ORDER — TIZANIDINE 2 MG/1
2 TABLET ORAL EVERY 8 HOURS
Qty: 30 | Refills: 0 | Status: ACTIVE | COMMUNITY
Start: 2024-01-31 | End: 1900-01-01

## 2024-01-31 RX ORDER — KETOROLAC TROMETHAMINE 30 MG/ML
30 INJECTION, SOLUTION INTRAMUSCULAR; INTRAVENOUS
Qty: 1 | Refills: 0 | Status: COMPLETED | OUTPATIENT
Start: 2024-01-31

## 2024-01-31 RX ADMIN — KETOROLAC TROMETHAMINE 0 MG/ML: 30 INJECTION, SOLUTION INTRAMUSCULAR; INTRAVENOUS at 00:00

## 2024-02-06 NOTE — H&P PST ADULT - MAMMOGRAM, RESULTS OF LAST, PROFILE
Detail Level: Detailed Quality 226: Preventive Care And Screening: Tobacco Use: Screening And Cessation Intervention: Patient screened for tobacco use and is an ex/non-smoker WNL

## 2024-02-07 NOTE — HISTORY OF PRESENT ILLNESS
[Yes] : The patient is currently working. [FreeTextEntry1] : Patient here today for low back pain that radiates down both legs. Patient states she fell on 1/12/2024 while going to sit down at her desk and the chair rolled out from under her and she landed on her left buttock.  She is still working but pain has increased starting Friday 1/26/2024.  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

## 2024-02-07 NOTE — ASSESSMENT
[Indicate if, in your opinion, the incident that the patient described was the competent medical cause of this injury/illness.] : The incident that the patient described was the competent medical cause of this injury/illness: Yes [Indicate if the patient's complaints are consistent with his/her history of the injury/illness.] : Indicate if the patient's complaints are consistent with his/her history of the injury/illness: Yes [Yes] : Yes, it is consistent [Can the patient return to usual work activities as indicated? If yes, indicate date___] : The patient cannot return to usual work activities as indicated. [FreeTextEntry5] : 100 [Physical Disability Temporary Total] : temporarily total disabled

## 2024-02-07 NOTE — RETURN TO WORK/SCHOOL
[FreeTextEntry1] : Patient unable to return to work at this time will be re evaluated in 2 weeks to determine return to work date.

## 2024-02-07 NOTE — PHYSICAL EXAM
[Antalgic] : antalgic [Stooped] : stooped [Cane] : ambulates with cane [LE] : Sensory: Intact in bilateral lower extremities [0] : left ankle jerk 0 [Obese] : obese [SLR] : negative straight leg raise [Plantar Reflex Right Only] : absent on the right [Plantar Reflex Left Only] : absent on the left [DTR Reflexes Clonus Of Right Ankle (___ Beats)] : absent on the right [DTR Reflexes Clonus Of Left Ankle (___ Beats)] : absent on the left [de-identified] : healed lumbar incision healed left ankle incisions  [de-identified] : 4 views lumbar spine obtained today demonstrate pedicle screw and gia construct from L4-S1 in good position with no implant loosening or migration.  Intervertebral cages seen at L4-5 and L5-S1 with no interval change.  No dynamic instability between flexion-extension.  No obvious acute fractures noted.  Normal lumbar lordosis with degenerative changes in the thoracolumbar junction.  AP pelvis demonstrates partially visualized bilateral hip arthroplasty implants.  Some ossification of the gluteus tendons along the right greater trochanter.  No obvious acute fractures.  Pubic symphysis degeneration.

## 2024-02-07 NOTE — DISCUSSION/SUMMARY
[Medication Risks Reviewed] : Medication risks reviewed [de-identified] : Patient today presents with symptoms of low back pain and buttock pain as well as pain radiating into the legs after a fall at work on January 12, 2024.  No obvious obvious implant related changes identified though there is questionable lucency around the L4-5 intervertebral cage without migration.  Prescribed her a course of Celebrex tizanidine and physical therapy.  For pain today offered her an injection of Toradol and she wanted to proceed.  Under sterile conditions 30 mg of Toradol was administered intermuscularly by the LPN without incident.  Recommended follow-up in 2 weeks for reevaluation.  If she continues to be symptomatic MRI lumbar spine may be considered at follow-up with and without contrast.  She is unable to work at this time and we will see her back for reevaluation  The patient was educated regarding their condition, treatment options as well as prescribed course of treatment.  Risks and benefits as well as alternatives to the proposed treatment were also provided to the patient  They were given the opportunity to have all their questions answered to their satisfaction.  Vital signs were reviewed with the patient and the patient was instructed to followup with their primary care provider for further management. There were no PAs or scribes used in the evaluation, exam or treatment plan discussion. The surgeon was the primary evaluating or treating physician as noted above.

## 2024-02-14 ENCOUNTER — APPOINTMENT (OUTPATIENT)
Dept: ORTHOPEDIC SURGERY | Facility: CLINIC | Age: 69
End: 2024-02-14
Payer: OTHER MISCELLANEOUS

## 2024-02-14 VITALS
WEIGHT: 252 LBS | SYSTOLIC BLOOD PRESSURE: 137 MMHG | BODY MASS INDEX: 45.22 KG/M2 | HEIGHT: 62.5 IN | HEART RATE: 59 BPM | DIASTOLIC BLOOD PRESSURE: 69 MMHG

## 2024-02-14 PROCEDURE — 99214 OFFICE O/P EST MOD 30 MIN: CPT

## 2024-02-14 NOTE — DISCUSSION/SUMMARY
[Medication Risks Reviewed] : Medication risks reviewed [de-identified] : MRI of the pelvis spine performed previously was independent reviewed by me and findings discussed with the patient. Schedule left SI joint injection for ongoing left buttock pain in the setting of traumatic injury.  She has degenerative change of the sacroiliac joints bilaterally and appear to have aggravated pain as a result of the fall. Continue PT, new prescription provided. continue celebrex and tizanidine  which she just got from work comp on monday. She is unable to work for the next 2 weeks and a note to that effect will be provided.  The patient was educated regarding their condition, treatment options as well as prescribed course of treatment.  Risks and benefits as well as alternatives to the proposed treatment were also provided to the patient  They were given the opportunity to have all their questions answered to their satisfaction.  Vital signs were reviewed with the patient and the patient was instructed to followup with their primary care provider for further management. There were no PAs or scribes used in the evaluation, exam or treatment plan discussion. The surgeon was the primary evaluating or treating physician as noted above.

## 2024-02-14 NOTE — HISTORY OF PRESENT ILLNESS
[8] : a current pain level of 8/10 [Walking] : walking [Sitting] : sitting [Standing] : standing [Daily] : ~He/She~ states the symptoms seem to be occuring daily [Rest] : relieved by rest [de-identified] : Patient is here today to review mri pelvis 2/9/2024. [de-identified] : celebrex tizanidine [FreeTextEntry1] : History of Present Illness Patient is here today to review mri pelvis 2/9/2024.    Pain levels include a current pain level of 8/10. Her symptoms occur while walking, sitting and standing.  She states the symptoms seem to be occuring daily.  Relieving factors include relieved by rest . celebrex tizanidine.   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 [No] : The patient is currently not working. [FreeTextEntry6] : 1/31/24

## 2024-02-14 NOTE — REASON FOR VISIT
[Degenerative Joint Disease] : degenerative joint disease [Radiculopathy] : radiculopathy [Follow-Up Visit] : a follow-up visit for [Back Pain] : back pain

## 2024-02-14 NOTE — PHYSICAL EXAM
[Antalgic] : antalgic [Stooped] : stooped [Cane] : ambulates with cane [LE] : Sensory: Intact in bilateral lower extremities [0] : left ankle jerk 0 [Obese] : obese [SLR] : negative straight leg raise [Plantar Reflex Right Only] : absent on the right [Plantar Reflex Left Only] : absent on the left [DTR Reflexes Clonus Of Right Ankle (___ Beats)] : absent on the right [DTR Reflexes Clonus Of Left Ankle (___ Beats)] : absent on the left [de-identified] : healed lumbar incision healed left ankle incisions  [de-identified] : Exam requested by: SARAN DIXON  [address] [place] [address] SITE PERFORMED: [facility] Patient: HARIS COHEN YOB: 1955 Phone: [phone] MRN: [id number] Acc: [id number] Date of Exam: 02-   EXAM:  MRI PELVIS WITHOUT CONTRAST  HISTORY:  Pain  TECHNIQUE: Multiplanar multisequence MR imaging of the sacrum was completed on a 1.5 Ksenia magnet. The field-of-view includes the sacrum and medial aspect of the ileum and ischium. Metal artifact reduction sequences were added.   COMPARISON:  Radiographs 9/17/2019  FINDINGS:    There is metal artifact from lower lumbar spine instrumentation. There is anterior angulation of the S5 vertebral body without marrow edema. No acute fracture.  The SI joints show small osteophytes with a small joint effusion on the right. No evidence for synovitis or erosions. Bilateral hip replacements are noted. There are mild degenerative changes of the symphysis pubis.  Unremarkable hamstring tendons. The gluteal tendons were not included in this field of view. No evidence for bursal distention. There is asymmetric fatty infiltration of the right gluteus minimus musculature.  IMPRESSION:  1. 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 2. Likely remote fracture of S5 versus developmental variant. No acute fracture. 3. Lower lumbar spine degenerative changes and instrumentation. 4. Bilateral hip arthroplasties.   Thank you for the opportunity to participate in the care of this patient.     BENJAMIN ORR MD  - Electronically Signed: 02- 1:04 PM  Physician to Physician Direct Line is:

## 2024-02-14 NOTE — PHYSICAL EXAM
[Antalgic] : antalgic [Stooped] : stooped [Cane] : ambulates with cane [LE] : Sensory: Intact in bilateral lower extremities [0] : left ankle jerk 0 [Obese] : obese [SLR] : negative straight leg raise [Plantar Reflex Right Only] : absent on the right [Plantar Reflex Left Only] : absent on the left [DTR Reflexes Clonus Of Right Ankle (___ Beats)] : absent on the right [DTR Reflexes Clonus Of Left Ankle (___ Beats)] : absent on the left [de-identified] : healed lumbar incision healed left ankle incisions  [de-identified] : Exam requested by: SARAN DIXON  [address], [address] [place] [address] SITE PERFORMED: [facility] Patient: HARIS COHEN YOB: 1955 Phone: [phone] MRN: [id number] Acc: [id number] Date of Exam: 02-   EXAM:  MRI PELVIS WITHOUT CONTRAST  HISTORY:  Pain  TECHNIQUE: Multiplanar multisequence MR imaging of the sacrum was completed on a 1.5 Ksenia magnet. The field-of-view includes the sacrum and medial aspect of the ileum and ischium. Metal artifact reduction sequences were added.   COMPARISON:  Radiographs 9/17/2019  FINDINGS:    There is metal artifact from lower lumbar spine instrumentation. There is anterior angulation of the S5 vertebral body without marrow edema. No acute fracture.  The SI joints show small osteophytes with a small joint effusion on the right. No evidence for synovitis or erosions. Bilateral hip replacements are noted. There are mild degenerative changes of the symphysis pubis.  Unremarkable hamstring tendons. The gluteal tendons were not included in this field of view. No evidence for bursal distention. There is asymmetric fatty infiltration of the right gluteus minimus musculature.  IMPRESSION:  1. 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 2. Likely remote fracture of S5 versus developmental variant. No acute fracture. 3. Lower lumbar spine degenerative changes and instrumentation. 4. Bilateral hip arthroplasties.   Thank you for the opportunity to participate in the care of this patient.     BENJAMIN ORR MD  - Electronically Signed: 02- 1:04 PM  Physician to Physician Direct Line is:

## 2024-02-14 NOTE — HISTORY OF PRESENT ILLNESS
[8] : a current pain level of 8/10 [Walking] : walking [Sitting] : sitting [Standing] : standing [Daily] : ~He/She~ states the symptoms seem to be occuring daily [Rest] : relieved by rest [de-identified] : Patient is here today to review mri pelvis 2/9/2024. [de-identified] : celebrex tizanidine [FreeTextEntry1] : History of Present Illness Patient is here today to review mri pelvis 2/9/2024.    Pain levels include a current pain level of 8/10. Her symptoms occur while walking, sitting and standing.  She states the symptoms seem to be occuring daily.  Relieving factors include relieved by rest . celebrex tizanidine.   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 [No] : The patient is currently not working. [FreeTextEntry6] : 1/31/24

## 2024-02-14 NOTE — RETURN TO WORK/SCHOOL
[Return Date: _____] : as of [unfilled].  This has been discussed in detail with ~Jackelyn~ and ~he/she~ understands this. [Full Time Work] : full time work [Work] : work [___ Weeks] : I will re-evaluate the patient in [unfilled] week(s), at which time I will provide an update to their current status

## 2024-02-14 NOTE — DISCUSSION/SUMMARY
[Medication Risks Reviewed] : Medication risks reviewed [de-identified] : MRI of the pelvis spine performed previously was independent reviewed by me and findings discussed with the patient. Schedule left SI joint injection for ongoing left buttock pain in the setting of traumatic injury.  She has degenerative change of the sacroiliac joints bilaterally and appear to have aggravated pain as a result of the fall. Continue PT, new prescription provided. continue celebrex and tizanidine  which she just got from work comp on monday. She is unable to work for the next 2 weeks and a note to that effect will be provided.  The patient was educated regarding their condition, treatment options as well as prescribed course of treatment.  Risks and benefits as well as alternatives to the proposed treatment were also provided to the patient  They were given the opportunity to have all their questions answered to their satisfaction.  Vital signs were reviewed with the patient and the patient was instructed to followup with their primary care provider for further management. There were no PAs or scribes used in the evaluation, exam or treatment plan discussion. The surgeon was the primary evaluating or treating physician as noted above.

## 2024-02-28 ENCOUNTER — APPOINTMENT (OUTPATIENT)
Dept: ORTHOPEDIC SURGERY | Facility: CLINIC | Age: 69
End: 2024-02-28
Payer: OTHER MISCELLANEOUS

## 2024-02-28 VITALS
BODY MASS INDEX: 45.22 KG/M2 | WEIGHT: 252 LBS | HEIGHT: 62.5 IN | DIASTOLIC BLOOD PRESSURE: 78 MMHG | HEART RATE: 66 BPM | SYSTOLIC BLOOD PRESSURE: 158 MMHG

## 2024-02-28 PROCEDURE — 99214 OFFICE O/P EST MOD 30 MIN: CPT

## 2024-02-28 NOTE — PHYSICAL EXAM
[Antalgic] : antalgic [Stooped] : stooped [Cane] : ambulates with cane [SLR] : negative straight leg raise [LE] : Sensory: Intact in bilateral lower extremities [0] : right ankle jerk 0 [Plantar Reflex Right Only] : absent on the right [DTR Reflexes Clonus Of Right Ankle (___ Beats)] : absent on the right [Plantar Reflex Left Only] : absent on the left [DTR Reflexes Clonus Of Left Ankle (___ Beats)] : absent on the left [Obese] : obese [de-identified] : healed lumbar incision healed left ankle incisions

## 2024-02-28 NOTE — HISTORY OF PRESENT ILLNESS
[Has the patient missed work because of the injury/illness?] : The patient has missed work because of the injury/illness. [No] : The patient is currently not working. 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 Patient first missed work 1/31/2024. She states the left side back pain is a little better however now having right side pain and numbness in toes and pain into leg. Now increasing numbness in right foot 7/10. Doing PT twice a week.  Overall 30% better in left buttock.   [FreeTextEntry6] : 1/31/24

## 2024-02-28 NOTE — DISCUSSION/SUMMARY
[Medication Risks Reviewed] : Medication risks reviewed [de-identified] : Patient reports that her left buttock symptoms improved approximate 30% since her last visit with physical therapy.  However she has reported increasing right buttock pain as well as numbness increasing down into her right leg.  Given her spinal fusion surgery at this time I recommended that we check MRI lumbar spine and a prescription was provided. Continue PT twice a week and a prescription provided. tizanidine and celebrex prescribed. She is unable to return to work at this time because of her ongoing symptoms and a note to that effect was provided.  I will see her back after the MRI.  The patient was educated regarding their condition, treatment options as well as prescribed course of treatment.  Risks and benefits as well as alternatives to the proposed treatment were also provided to the patient  They were given the opportunity to have all their questions answered to their satisfaction.  Vital signs were reviewed with the patient and the patient was instructed to followup with their primary care provider for further management. There were no PAs or scribes used in the evaluation, exam or treatment plan discussion. The surgeon was the primary evaluating or treating physician as noted above.

## 2024-02-28 NOTE — RETURN TO WORK/SCHOOL
[FreeTextEntry1] : Patient needs to remain out of work for 4 weeks. She will be reevaluated at that time.

## 2024-02-29 RX ORDER — CELECOXIB 100 MG/1
100 CAPSULE ORAL TWICE DAILY
Qty: 60 | Refills: 2 | Status: ACTIVE | COMMUNITY
Start: 2023-07-05 | End: 1900-01-01

## 2024-02-29 RX ORDER — GABAPENTIN 300 MG/1
300 CAPSULE ORAL
Qty: 60 | Refills: 3 | Status: ACTIVE | COMMUNITY
Start: 2023-02-08 | End: 1900-01-01

## 2024-03-18 ENCOUNTER — APPOINTMENT (OUTPATIENT)
Dept: ORTHOPEDIC SURGERY | Facility: CLINIC | Age: 69
End: 2024-03-18
Payer: OTHER MISCELLANEOUS

## 2024-03-18 VITALS
WEIGHT: 252 LBS | HEART RATE: 60 BPM | BODY MASS INDEX: 45.22 KG/M2 | SYSTOLIC BLOOD PRESSURE: 155 MMHG | DIASTOLIC BLOOD PRESSURE: 85 MMHG | HEIGHT: 62.5 IN

## 2024-03-18 DIAGNOSIS — M54.16 RADICULOPATHY, LUMBAR REGION: ICD-10-CM

## 2024-03-18 PROCEDURE — 99214 OFFICE O/P EST MOD 30 MIN: CPT

## 2024-03-18 NOTE — HISTORY OF PRESENT ILLNESS
[de-identified] : Patient is here today to review mri lumbar spine 3/8/2024. Pain level varies from 5-7. [Walking] : walking [Standing] : standing [Prolonged Sitting] : worsened by prolonged sitting [Prolonged Standing] : worsened by prolonged standing [Rest] : relieved by rest [de-identified] : celebrex and gabapentin [Has the patient missed work because of the injury/illness?] : The patient has missed work because of the injury/illness. [No] : The patient is currently not working. [FreeTextEntry1] : Patient is here today to review mri lumbar spine 3/8/2024. Pain level varies from 5-7.    Her symptoms occur while walking and standing.      Modifying factors - worsened by prolonged sitting and worsened by prolonged standing. Relieving factors include relieved by rest . celebrex and gabapentin. 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

## 2024-03-18 NOTE — DISCUSSION/SUMMARY
[Medication Risks Reviewed] : Medication risks reviewed [de-identified] : MRI L spoine and pelvis reviewed.  She has no significant spinal stenosis and no obvious implant related complications. Will schedule bilateral SI joint injection since I do not see any lumbar spine pathology contributing to her current clinical presentation. Saw a physiatrist as recommended previously and I recommended again that she follow-up with a physiatrist for ongoing nonsurgical management for condition. Out of work till end of March.  She will continue physical therapy and exercises.  The patient was educated regarding their condition, treatment options as well as prescribed course of treatment.  Risks and benefits as well as alternatives to the proposed treatment were also provided to the patient  They were given the opportunity to have all their questions answered to their satisfaction.  Vital signs were reviewed with the patient and the patient was instructed to followup with their primary care provider for further management. There were no PAs or scribes used in the evaluation, exam or treatment plan discussion. The surgeon was the primary evaluating or treating physician as noted above.

## 2024-03-18 NOTE — HISTORY OF PRESENT ILLNESS
[de-identified] : Patient is here today to review mri lumbar spine 3/8/2024. Pain level varies from 5-7. [Walking] : walking [Standing] : standing [Prolonged Sitting] : worsened by prolonged sitting [Prolonged Standing] : worsened by prolonged standing [de-identified] : celebrex and gabapentin [Rest] : relieved by rest [Has the patient missed work because of the injury/illness?] : The patient has missed work because of the injury/illness. [No] : The patient is currently not working. [FreeTextEntry1] : Patient is here today to review mri lumbar spine 3/8/2024. Pain level varies from 5-7.    Her symptoms occur while walking and standing.      Modifying factors - worsened by prolonged sitting and worsened by prolonged standing. Relieving factors include relieved by rest . celebrex and gabapentin. 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

## 2024-03-18 NOTE — PHYSICAL EXAM
[Antalgic] : antalgic [Stooped] : stooped [Cane] : ambulates with cane [LE] : Sensory: Intact in bilateral lower extremities [0] : left ankle jerk 0 [Obese] : obese [SLR] : negative straight leg raise [Plantar Reflex Right Only] : absent on the right [Plantar Reflex Left Only] : absent on the left [DTR Reflexes Clonus Of Right Ankle (___ Beats)] : absent on the right [DTR Reflexes Clonus Of Left Ankle (___ Beats)] : absent on the left [de-identified] : healed lumbar incision healed left ankle incisions  [de-identified] :  Exam requested by: SARAN DIXON  [facility] [address], [address] [place] [address] SITE PERFORMED: [facility] SITE PHONE: [phone] Patient: HARIS COHEN YOB: 1955 Phone: [phone] MRN: [id number] Acc: [id number] Date of Exam: 03-   EXAM:  MRI LUMBAR SPINE WITHOUT CONTRAST  HISTORY: Lower back pain post fall January 2024.  History of lumbar fusion 2023.  TECHNIQUE: Multiplanar, multi-sequential MRI of the lumbar spine was obtained on a 3T scanner using a standard protocol.  COMPARISON:  Preoperative lumbar MRI dated 2/27/2019  FINDINGS:  OSSEOUS STRUCTURES: Patient is post interbody fusion and posterior instrumented fusion from L4-S1. Metallic susceptibility artifact at these levels mildly limits evaluation. No acute fracture or subluxation. Lumbar lordosis is maintained without listhesis. Scattered vertebral body hemangiomata without surrounding edema. Mild multilevel discogenic degenerative changes without new underlying endplate marrow edema. Redemonstration of endplate edema across L5-S1, unchanged.   SPINAL CORD AND CONUS MEDULLARIS: Normal signal. The conus medullaris terminates at L1-L2.  PARASPINAL AND INTRA-ABDOMINAL SOFT TISSUES: Diffuse fatty muscle atrophy of the posterior paraspinous musculature without edema. Postsurgical change of the lower lumbar spine subcutaneous tissues. The visualized retroperitoneum is unremarkable.  INCLUDED THORACIC SPINE AND SACRUM: Mild interval progression of mild multilevel degenerative changes of the lower thoracic spine. The visualized sacrum is unremarkable and intact.  DISCS: Multilevel disc desiccation. Mild multilevel intervertebral disc height loss of the lower thoracic spine.  The following axial levels are imaged and detailed below:  T12-L1: Small posterior disc bulge with superimposed left paracentral disc protrusion with focal effacement of the ventral thecal sac. No central spinal canal narrowing. Bilateral facet arthrosis without neural foraminal narrowing.  L1-L2: No significant disc osteophyte complex. Mild bilateral facet arthrosis. No spinal canal or neural foraminal narrowing.  L2-L3: Small posterior disc bulge. Mild to moderate bilateral facet arthrosis. No spinal canal or neural foraminal narrowing.  L3-L4: Unchanged small posterior disc bulge with mild effacement of the ventral thecal sac. No central spinal canal narrowing. Partially imaged left greater than right facet arthrosis without neural foraminal narrowing  L4-L5: Limited evaluation due to susceptibility artifact. No significant persistent disc osteophyte complex identified. Patent spinal canal. Suggestion of left greater than right facet arthrosis with mild narrowing of the left lateral recess. Query mild left neural foraminal narrowing however, evaluation of the neural foramina is limited due to susceptibility artifact.   L5-S1: Limited evaluation due to susceptibility artifact. Trace to small residual disc osteophyte complex without spinal canal narrowing. Bilateral facet arthrosis. Patent neural foramina.   IMPRESSION:  MRI of the lumbar spine demonstrates:  1.  Posterior instrumented fusion and interbody fusion from L4-S1. 2.  No acute osseous abnormality or malalignment. 3.  Multilevel degenerative changes, most pronounced within the partially imaged lower thoracic spine and at L3-L4. 4.  Patent spinal canal throughout the imaged spine. 5.  Limited evaluation at L4-L5 with suggestion of mild left neural foraminal narrowing.  Thank you for the opportunity to participate in the care of this patient.     JERSON MORENO MD  - Electronically Signed: 03- 12:14 PM  Physician to Physician Direct Line is: [phone]  	 Exam requested by: SARAN DIXON  [facility], [facility] [address] [facility] [address] SITE PERFORMED: [facility] Patient: HARIS COHEN YOB: 1955 Phone: [phone] MRN: [id number] Acc: [id number] Date of Exam: 02-   EXAM:  MRI PELVIS WITHOUT CONTRAST  HISTORY:  Pain  TECHNIQUE: Multiplanar multisequence MR imaging of the sacrum was completed on a 1.5 Ksenia magnet. The field-of-view includes the sacrum and medial aspect of the ileum and ischium. Metal artifact reduction sequences were added.   COMPARISON:  Radiographs 9/17/2019  FINDINGS:    There is metal artifact from lower lumbar spine instrumentation. There is anterior angulation of the S5 vertebral body without marrow edema. No acute fracture.  The SI joints show small osteophytes with a small joint effusion on the right. No evidence for synovitis or erosions. Bilateral hip replacements are noted. There are mild degenerative changes of the symphysis pubis.  Unremarkable hamstring tendons. The gluteal tendons were not included in this field of view. No evidence for bursal distention. There is asymmetric fatty infiltration of the right gluteus minimus musculature.  IMPRESSION:  1. 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 2. Likely remote fracture of S5 versus developmental variant. No acute fracture. 3. Lower lumbar spine degenerative changes and instrumentation. 4. Bilateral hip arthroplasties.   Thank you for the opportunity to participate in the care of this patient.     BENJAMIN ORR MD  - Electronically Signed: 02- 1:04 PM  Physician to Physician Direct Line is:

## 2024-03-18 NOTE — PHYSICAL EXAM
[Antalgic] : antalgic [Stooped] : stooped [Cane] : ambulates with cane [LE] : Sensory: Intact in bilateral lower extremities [0] : left ankle jerk 0 [Obese] : obese [SLR] : negative straight leg raise [Plantar Reflex Right Only] : absent on the right [Plantar Reflex Left Only] : absent on the left [DTR Reflexes Clonus Of Right Ankle (___ Beats)] : absent on the right [DTR Reflexes Clonus Of Left Ankle (___ Beats)] : absent on the left [de-identified] : healed lumbar incision healed left ankle incisions  [de-identified] :  Exam requested by: SARAN DIXON  [facility] [address], [address] [place] [address] SITE PERFORMED: [facility] SITE PHONE: [phone] Patient: HARIS COHEN YOB: 1955 Phone: [phone] MRN: [id number] Acc: [id number] Date of Exam: 03-   EXAM:  MRI LUMBAR SPINE WITHOUT CONTRAST  HISTORY: Lower back pain post fall January 2024.  History of lumbar fusion 2023.  TECHNIQUE: Multiplanar, multi-sequential MRI of the lumbar spine was obtained on a 3T scanner using a standard protocol.  COMPARISON:  Preoperative lumbar MRI dated 2/27/2019  FINDINGS:  OSSEOUS STRUCTURES: Patient is post interbody fusion and posterior instrumented fusion from L4-S1. Metallic susceptibility artifact at these levels mildly limits evaluation. No acute fracture or subluxation. Lumbar lordosis is maintained without listhesis. Scattered vertebral body hemangiomata without surrounding edema. Mild multilevel discogenic degenerative changes without new underlying endplate marrow edema. Redemonstration of endplate edema across L5-S1, unchanged.   SPINAL CORD AND CONUS MEDULLARIS: Normal signal. The conus medullaris terminates at L1-L2.  PARASPINAL AND INTRA-ABDOMINAL SOFT TISSUES: Diffuse fatty muscle atrophy of the posterior paraspinous musculature without edema. Postsurgical change of the lower lumbar spine subcutaneous tissues. The visualized retroperitoneum is unremarkable.  INCLUDED THORACIC SPINE AND SACRUM: Mild interval progression of mild multilevel degenerative changes of the lower thoracic spine. The visualized sacrum is unremarkable and intact.  DISCS: Multilevel disc desiccation. Mild multilevel intervertebral disc height loss of the lower thoracic spine.  The following axial levels are imaged and detailed below:  T12-L1: Small posterior disc bulge with superimposed left paracentral disc protrusion with focal effacement of the ventral thecal sac. No central spinal canal narrowing. Bilateral facet arthrosis without neural foraminal narrowing.  L1-L2: No significant disc osteophyte complex. Mild bilateral facet arthrosis. No spinal canal or neural foraminal narrowing.  L2-L3: Small posterior disc bulge. Mild to moderate bilateral facet arthrosis. No spinal canal or neural foraminal narrowing.  L3-L4: Unchanged small posterior disc bulge with mild effacement of the ventral thecal sac. No central spinal canal narrowing. Partially imaged left greater than right facet arthrosis without neural foraminal narrowing  L4-L5: Limited evaluation due to susceptibility artifact. No significant persistent disc osteophyte complex identified. Patent spinal canal. Suggestion of left greater than right facet arthrosis with mild narrowing of the left lateral recess. Query mild left neural foraminal narrowing however, evaluation of the neural foramina is limited due to susceptibility artifact.   L5-S1: Limited evaluation due to susceptibility artifact. Trace to small residual disc osteophyte complex without spinal canal narrowing. Bilateral facet arthrosis. Patent neural foramina.   IMPRESSION:  MRI of the lumbar spine demonstrates:  1.  Posterior instrumented fusion and interbody fusion from L4-S1. 2.  No acute osseous abnormality or malalignment. 3.  Multilevel degenerative changes, most pronounced within the partially imaged lower thoracic spine and at L3-L4. 4.  Patent spinal canal throughout the imaged spine. 5.  Limited evaluation at L4-L5 with suggestion of mild left neural foraminal narrowing.  Thank you for the opportunity to participate in the care of this patient.     JERSON MORENO MD  - Electronically Signed: 03- 12:14 PM  Physician to Physician Direct Line is: [phone]  	 Exam requested by: SARAN DIXON  [facility], [facility] [address] [facility] [address] SITE PERFORMED: [facility] Patient: HARIS COHEN YOB: 1955 Phone: [phone] MRN: [id number] Acc: [id number] Date of Exam: 02-   EXAM:  MRI PELVIS WITHOUT CONTRAST  HISTORY:  Pain  TECHNIQUE: Multiplanar multisequence MR imaging of the sacrum was completed on a 1.5 Ksenia magnet. The field-of-view includes the sacrum and medial aspect of the ileum and ischium. Metal artifact reduction sequences were added.   COMPARISON:  Radiographs 9/17/2019  FINDINGS:    There is metal artifact from lower lumbar spine instrumentation. There is anterior angulation of the S5 vertebral body without marrow edema. No acute fracture.  The SI joints show small osteophytes with a small joint effusion on the right. No evidence for synovitis or erosions. Bilateral hip replacements are noted. There are mild degenerative changes of the symphysis pubis.  Unremarkable hamstring tendons. The gluteal tendons were not included in this field of view. No evidence for bursal distention. There is asymmetric fatty infiltration of the right gluteus minimus musculature.  IMPRESSION:  1. 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 2. Likely remote fracture of S5 versus developmental variant. No acute fracture. 3. Lower lumbar spine degenerative changes and instrumentation. 4. Bilateral hip arthroplasties.   Thank you for the opportunity to participate in the care of this patient.     BENJAMIN ORR MD  - Electronically Signed: 02- 1:04 PM  Physician to Physician Direct Line is:

## 2024-03-18 NOTE — DISCUSSION/SUMMARY
[Medication Risks Reviewed] : Medication risks reviewed [de-identified] : MRI L spoine and pelvis reviewed.  She has no significant spinal stenosis and no obvious implant related complications. Will schedule bilateral SI joint injection since I do not see any lumbar spine pathology contributing to her current clinical presentation. Saw a physiatrist as recommended previously and I recommended again that she follow-up with a physiatrist for ongoing nonsurgical management for condition. Out of work till end of March.  She will continue physical therapy and exercises.  The patient was educated regarding their condition, treatment options as well as prescribed course of treatment.  Risks and benefits as well as alternatives to the proposed treatment were also provided to the patient  They were given the opportunity to have all their questions answered to their satisfaction.  Vital signs were reviewed with the patient and the patient was instructed to followup with their primary care provider for further management. There were no PAs or scribes used in the evaluation, exam or treatment plan discussion. The surgeon was the primary evaluating or treating physician as noted above.

## 2024-04-18 ENCOUNTER — TRANSCRIPTION ENCOUNTER (OUTPATIENT)
Age: 69
End: 2024-04-18

## 2024-04-18 VITALS
HEART RATE: 74 BPM | RESPIRATION RATE: 18 BRPM | WEIGHT: 248.02 LBS | OXYGEN SATURATION: 99 % | TEMPERATURE: 98 F | SYSTOLIC BLOOD PRESSURE: 176 MMHG | DIASTOLIC BLOOD PRESSURE: 61 MMHG | HEIGHT: 62 IN

## 2024-04-18 NOTE — H&P PST ADULT - BP NONINVASIVE SYSTOLIC (MM HG)
Patient Education     Shingles  Shingles is a viral infection caused by the same virus that causes chicken pox. Anyone who has had chicken pox may get shingles later in life. The virus stays in the body, but remains asleep (dormant). Shingles often occurs in older persons or persons with lowered immunity. But it can affect anyone at any age.  Shingles starts as a tingling patch of skin on one side of the body. Small, painful blisters may then appear. The rash rarely spreads to other parts of the body.  Exposure to shingles can't cause shingles. However, it can cause chicken pox in anyone who has not had chicken pox or has not been vaccinated. The contagious period ends when all blisters have crusted over, generally 1 to 2 weeks after the illness starts.  After the blisters heal, the affected skin may be sensitive or painful for weeks or months, gradually resolving over time. But, sometimes this can last longer and be permanent (called postherpetic neuralgia.)  Shingles vaccines are available. Vaccination can help prevent shingles or make it less painful. It is generally recommended for adults older than 50, even if you've had singles in the past. Talk with your healthcare provider about when to get vaccinated and which vaccine is best for you.  Home care  · Medicines may be prescribed to help relieve pain. Take these medicines as directed. Ask your healthcare provider or pharmacist before using over-the-counter medicines for helping treat pain and itching.  · In certain cases, antiviral medicines may be prescribed to reduce pain, shorten the illness, and prevent neuralgia. Take these medicines as directed.  · Compresses made from a solution of cool water mixed with cornstarch or baking soda may help relieve pain and itching.   · Gently wash skin daily with soap and water to help prevent infection. Be certain to rinse off all of the soap, which can be irritating.  · Trim fingernails and try not to scratch. Scratching  the sores may leave scars.  · Stay home from work or school until all blisters have formed a crust and you are no longer contagious.  Follow-up care  Follow up with your healthcare provider, or as directed.  When to seek medical advice  · Fever of 100.4°F (38°C) or higher, or as directed by your healthcare provider  · Affected skin is on the face or neck and any of the following occur:  ? Headache  ? Eye pain  ? Changes in vision  ? Sores near the eye  ? Weakness of facial muscles  · Blisters occurring on new areas of the body  · Pain, redness, or swelling of a joint  · Signs of skin infection: colored drainage from the sores, warmth, increasing redness, fever, or increasing pain   Date Last Reviewed: 4/1/2018  © 2962-3622 [employer] ([employer]), [employer]. [address] [place] [address], [place] [address]. All rights reserved. This information is not intended as a substitute for professional medical care. Always follow your healthcare professional's instructions.            094

## 2024-04-18 NOTE — ASU PATIENT PROFILE, ADULT - FALL HARM RISK - HARM RISK INTERVENTIONS

## 2024-04-18 NOTE — ASU PATIENT PROFILE, ADULT - NSICDXPASTSURGICALHX_GEN_ALL_CORE_FT
PAST SURGICAL HISTORY:  Adenoma of left breast excision 1997    Cyst of salivary gland excision 1989    History of parathyroid surgery     History of pterygium excision left eye 2005    History of total hip replacement, left 2020    History of total hip replacement, right 2020    S/P ankle ligament repair left ankle 1985    S/P carpal tunnel release right 2009    S/P excision of neuroma left ankle 1988    S/P hysterectomy 2002    S/P Mohs surgery for basal cell carcinoma     S/P trigger finger release     Status post trigger finger release right thumb and middle finger 2011    Tibialis posterior tendon rupture repair and foot surgery  left 2012     PAST SURGICAL HISTORY:  Adenoma of left breast excision 1997    Cyst of salivary gland excision 1989    History of lumbar fusion     History of parathyroid surgery     History of pterygium excision left eye 2005    History of total hip replacement, left 2020    History of total hip replacement, right 2020    S/P ankle ligament repair left ankle 1985    S/P carpal tunnel release right 2009    S/P excision of neuroma left ankle 1988    S/P hysterectomy 2002    S/P Mohs surgery for basal cell carcinoma     S/P trigger finger release     Status post trigger finger release right thumb and middle finger 2011    Tibialis posterior tendon rupture repair and foot surgery  left 2012

## 2024-04-18 NOTE — ASU DISCHARGE PLAN (ADULT/PEDIATRIC) - NS MD DC FALL RISK RISK
For information on Fall & Injury Prevention, visit: [email or web address] OR  [email or web address] OR  https://www.cdc.gov/steadi/patient.html

## 2024-04-18 NOTE — ASU PATIENT PROFILE, ADULT - NSICDXPASTMEDICALHX_GEN_ALL_CORE_FT
PAST MEDICAL HISTORY:  Allergic rhinitis     Chronic venous insufficiency     GERD (gastroesophageal reflux disease)     Glaucoma     H/O basal cell carcinoma excision     Hashimoto's thyroiditis     HTN (hypertension)     Hyperparathyroidism being monitored by endocrinologist    Hypothyroidism     Lumbar radiculopathy     Lumbar spinal stenosis     Migraines     Morbid obesity with BMI of 50.0-59.9, adult BMI 51.5    JOSE RAUL (obstructive sleep apnea) On CPAP    Osteoarthritis of right hip left hip, hands    Raynauds disease     Rosacea     Vertigo      PAST MEDICAL HISTORY:  Allergic rhinitis     Chronic venous insufficiency     Eczema     GERD (gastroesophageal reflux disease)     Glaucoma     H/O basal cell carcinoma excision     Hashimoto's thyroiditis     HTN (hypertension)     Hyperparathyroidism being monitored by endocrinologist    Hypothyroidism     Lumbar radiculopathy     Lumbar spinal stenosis     Migraines     Morbid obesity with BMI of 50.0-59.9, adult BMI 51.5    JOSE RAUL (obstructive sleep apnea) On CPAP    Osteoarthritis of right hip left hip, hands    Raynauds disease     Rosacea     Vertigo

## 2024-04-19 ENCOUNTER — APPOINTMENT (OUTPATIENT)
Dept: ORTHOPEDIC SURGERY | Facility: HOSPITAL | Age: 69
End: 2024-04-19

## 2024-04-19 ENCOUNTER — OUTPATIENT (OUTPATIENT)
Dept: OUTPATIENT SERVICES | Facility: HOSPITAL | Age: 69
LOS: 1 days | End: 2024-04-19
Payer: COMMERCIAL

## 2024-04-19 VITALS
HEART RATE: 81 BPM | RESPIRATION RATE: 20 BRPM | DIASTOLIC BLOOD PRESSURE: 74 MMHG | OXYGEN SATURATION: 98 % | SYSTOLIC BLOOD PRESSURE: 145 MMHG

## 2024-04-19 DIAGNOSIS — Z90.710 ACQUIRED ABSENCE OF BOTH CERVIX AND UTERUS: Chronic | ICD-10-CM

## 2024-04-19 DIAGNOSIS — Z98.1 ARTHRODESIS STATUS: Chronic | ICD-10-CM

## 2024-04-19 DIAGNOSIS — Z96.642 PRESENCE OF LEFT ARTIFICIAL HIP JOINT: Chronic | ICD-10-CM

## 2024-04-19 DIAGNOSIS — M53.3 SACROCOCCYGEAL DISORDERS, NOT ELSEWHERE CLASSIFIED: ICD-10-CM

## 2024-04-19 DIAGNOSIS — D24.2 BENIGN NEOPLASM OF LEFT BREAST: Chronic | ICD-10-CM

## 2024-04-19 DIAGNOSIS — Z96.641 PRESENCE OF RIGHT ARTIFICIAL HIP JOINT: Chronic | ICD-10-CM

## 2024-04-19 DIAGNOSIS — Z98.890 OTHER SPECIFIED POSTPROCEDURAL STATES: Chronic | ICD-10-CM

## 2024-04-19 DIAGNOSIS — K11.6 MUCOCELE OF SALIVARY GLAND: Chronic | ICD-10-CM

## 2024-04-19 DIAGNOSIS — Z98.1 ARTHRODESIS STATUS: ICD-10-CM

## 2024-04-19 DIAGNOSIS — S86.119A STRAIN OF OTHER MUSCLE(S) AND TENDON(S) OF POSTERIOR MUSCLE GROUP AT LOWER LEG LEVEL, UNSPECIFIED LEG, INITIAL ENCOUNTER: Chronic | ICD-10-CM

## 2024-04-19 PROCEDURE — 27096 INJECT SACROILIAC JOINT: CPT | Mod: LT

## 2024-04-19 PROCEDURE — G0260: CPT | Mod: LT

## 2024-04-19 RX ORDER — MILK THISTLE 150 MG
2 CAPSULE ORAL
Qty: 0 | Refills: 0 | DISCHARGE

## 2024-04-19 RX ORDER — OMEGA-3 ACID ETHYL ESTERS 1 G
1 CAPSULE ORAL
Qty: 0 | Refills: 0 | DISCHARGE

## 2024-04-19 RX ORDER — LEVOTHYROXINE SODIUM 125 MCG
1 TABLET ORAL
Qty: 0 | Refills: 0 | DISCHARGE

## 2024-04-19 RX ORDER — CELECOXIB 200 MG/1
1 CAPSULE ORAL
Refills: 0 | DISCHARGE

## 2024-04-19 RX ORDER — MULTIVIT-MIN/FERROUS GLUCONATE 9 MG/15 ML
1 LIQUID (ML) ORAL
Qty: 0 | Refills: 0 | DISCHARGE

## 2024-04-19 RX ORDER — CETIRIZINE HYDROCHLORIDE 10 MG/1
1 TABLET ORAL
Qty: 0 | Refills: 0 | DISCHARGE

## 2024-04-19 RX ORDER — GABAPENTIN 400 MG/1
1 CAPSULE ORAL
Refills: 0 | DISCHARGE

## 2024-04-19 RX ORDER — MECLIZINE HCL 12.5 MG
0 TABLET ORAL
Qty: 0 | Refills: 0 | DISCHARGE

## 2024-04-19 RX ORDER — LATANOPROST 0.05 MG/ML
1 SOLUTION/ DROPS OPHTHALMIC; TOPICAL
Qty: 0 | Refills: 0 | DISCHARGE

## 2024-04-19 RX ORDER — SPIRONOLACTONE 25 MG/1
1 TABLET, FILM COATED ORAL
Qty: 0 | Refills: 0 | DISCHARGE

## 2024-04-19 RX ORDER — TIRZEPATIDE 15 MG/.5ML
5 INJECTION, SOLUTION SUBCUTANEOUS
Refills: 0 | DISCHARGE

## 2024-04-19 RX ORDER — RUXOLITINIB 15 MG/G
1 CREAM TOPICAL
Qty: 0 | Refills: 0 | DISCHARGE

## 2024-04-19 RX ORDER — IRBESARTAN 75 MG/1
1 TABLET ORAL
Qty: 0 | Refills: 0 | DISCHARGE

## 2024-04-19 RX ORDER — OLOPATADINE HYDROCHLORIDE 1 MG/ML
1 SOLUTION/ DROPS OPHTHALMIC
Qty: 0 | Refills: 0 | DISCHARGE

## 2024-04-19 NOTE — PHYSICAL THERAPY INITIAL EVALUATION ADULT - ASR WT BEARING STATUS EVAL
Patient: Panfilo Feliz    Procedure Summary       Date: 04/19/24 Room / Location: [facility]    Anesthesia Start: 1024 Anesthesia Stop: 1108    Procedure: ADULT TRANSESOPHAGEAL ECHO (JOSE) W/ CONT IF NECESSARY PER PROTOCOL Diagnosis:       Aneurysm of ascending aorta without rupture      Nonrheumatic aortic valve insufficiency      (Valvular Disease)      (Aortic Valve Assessment)    Scheduled Providers: Dilan Houston DO Provider: Yuliana Lin MD    Anesthesia Type: MAC ASA Status: 3            Anesthesia Type: MAC    Vitals  Vitals Value Taken Time   /79 04/19/24 1209   Temp     Pulse 62 04/19/24 1209   Resp 16 04/19/24 1125   SpO2 100 % 04/19/24 1209           Post Anesthesia Care and Evaluation    Patient location during evaluation: PACU  Patient participation: complete - patient participated  Level of consciousness: awake and alert  Pain management: satisfactory to patient    Airway patency: patent  Anesthetic complications: No anesthetic complications  PONV Status: none  Cardiovascular status: acceptable  Respiratory status: acceptable  Hydration status: acceptable    
no weight-bearing restrictions

## 2024-04-22 PROBLEM — L30.9 DERMATITIS, UNSPECIFIED: Chronic | Status: ACTIVE | Noted: 2024-04-19

## 2024-05-01 ENCOUNTER — APPOINTMENT (OUTPATIENT)
Dept: ORTHOPEDIC SURGERY | Facility: CLINIC | Age: 69
End: 2024-05-01
Payer: OTHER MISCELLANEOUS

## 2024-05-01 VITALS
HEIGHT: 62.5 IN | HEART RATE: 59 BPM | DIASTOLIC BLOOD PRESSURE: 77 MMHG | SYSTOLIC BLOOD PRESSURE: 131 MMHG | BODY MASS INDEX: 43.96 KG/M2 | WEIGHT: 245 LBS

## 2024-05-01 DIAGNOSIS — M43.10 SPONDYLOLISTHESIS, SITE UNSPECIFIED: ICD-10-CM

## 2024-05-01 PROCEDURE — 99213 OFFICE O/P EST LOW 20 MIN: CPT

## 2024-05-01 NOTE — PHYSICAL EXAM
[Antalgic] : antalgic [Stooped] : stooped [Cane] : ambulates with cane [LE] : Sensory: Intact in bilateral lower extremities [0] : left ankle jerk 0 [Obese] : obese [SLR] : negative straight leg raise [Plantar Reflex Right Only] : absent on the right [Plantar Reflex Left Only] : absent on the left [DTR Reflexes Clonus Of Right Ankle (___ Beats)] : absent on the right [DTR Reflexes Clonus Of Left Ankle (___ Beats)] : absent on the left [de-identified] : healed lumbar incision healed left ankle incisions

## 2024-05-01 NOTE — ASSESSMENT
[Indicate if, in your opinion, the incident that the patient described was the competent medical cause of this injury/illness.] : The incident that the patient described was the competent medical cause of this injury/illness: Yes [Indicate if the patient's complaints are consistent with his/her history of the injury/illness.] : Indicate if the patient's complaints are consistent with his/her history of the injury/illness: Yes [Yes] : Yes, it is consistent [Physical Disability Temporary Total] : temporarily total disabled [FreeTextEntry1] : - Summary : Ms. Hatfield has ongoing low back pain and sacroiliac joint dysfunction, with some improvement after the left sacroiliac joint injection but persistent symptoms, particularly on the right side and groin pain on the left side. She also experiences tingling in the toes. - Problems : - Low back pain  - Sacroiliac joint dysfunction  - Tingling in the toes  - Differential Diagnosis : - Sacroiliac joint dysfunction  - Lumbar radiculopathy  - Lumbar spinal stenosis [Can the patient return to usual work activities as indicated? If yes, indicate date___] : The patient cannot return to usual work activities as indicated. [FreeTextEntry5] : 100

## 2024-05-01 NOTE — REASON FOR VISIT
[Follow-Up Visit] : a follow-up visit for [Workers' Comp: Date of Injury: _______] : This visit is related to worker's compensation. Date of Injury: [unfilled] [FreeTextEntry2] : Left Sacroiliac Joint Injection.  04/19/2024

## 2024-05-01 NOTE — RETURN TO WORK/SCHOOL
[FreeTextEntry1] : Patient needs to remain out of work for the next 5 weeks until she is reevaluated.

## 2024-05-01 NOTE — HISTORY OF PRESENT ILLNESS
[Has the patient missed work because of the injury/illness?] : The patient has missed work because of the injury/illness. [No] : The patient is currently not working. [de-identified] : Patient presents today for follow up after left sacroiliac joint injection on4/19/24  Patient states she felt upto 40% improvement from the injection. She states she is still having numbness and tingling especially in the left foot and toes.  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

## 2024-05-01 NOTE — DISCUSSION/SUMMARY
[Medication Risks Reviewed] : Medication risks reviewed [de-identified] : - Summary : The plan includes obtaining authorization for a right sacroiliac joint injection, following up with the physiatrist for further management and work status evaluation, continuing physical therapy, and considering an epidural injection if the tingling in the toes persists. - Plan : - Obtain authorization for a right sacroiliac joint injection  - Follow up with the physiatrist for further management and work status evaluation  - Continue physical therapy, focusing on the sacroiliac joints and back  - Consider an epidural injection at L3 bilaterally if the tingling in the toes persists  - Provide a work note and physical therapy note  - Follow up in approximately 6 weeks to reassess and decide on further interventions if need

## 2024-06-03 ENCOUNTER — APPOINTMENT (OUTPATIENT)
Dept: ORTHOPEDIC SURGERY | Facility: CLINIC | Age: 69
End: 2024-06-03
Payer: OTHER MISCELLANEOUS

## 2024-06-03 VITALS
HEIGHT: 63.5 IN | SYSTOLIC BLOOD PRESSURE: 143 MMHG | HEART RATE: 60 BPM | BODY MASS INDEX: 43.75 KG/M2 | DIASTOLIC BLOOD PRESSURE: 86 MMHG | WEIGHT: 250 LBS

## 2024-06-03 DIAGNOSIS — M53.3 SACROCOCCYGEAL DISORDERS, NOT ELSEWHERE CLASSIFIED: ICD-10-CM

## 2024-06-03 DIAGNOSIS — Z98.1 ARTHRODESIS STATUS: ICD-10-CM

## 2024-06-03 DIAGNOSIS — M54.16 RADICULOPATHY, LUMBAR REGION: ICD-10-CM

## 2024-06-03 PROCEDURE — 99214 OFFICE O/P EST MOD 30 MIN: CPT

## 2024-06-11 NOTE — PHYSICAL EXAM
[Stooped] : stooped [Walker] : ambulates with walker [LE] : Sensory: Intact in bilateral lower extremities [0] : left ankle jerk 0 [Obese] : obese [SLR] : negative straight leg raise [Plantar Reflex Right Only] : absent on the right [Plantar Reflex Left Only] : absent on the left [DTR Reflexes Clonus Of Right Ankle (___ Beats)] : absent on the right [DTR Reflexes Clonus Of Left Ankle (___ Beats)] : absent on the left [de-identified] : healed lumbar incision healed left ankle incisions SI tenderness R > L

## 2024-06-11 NOTE — HISTORY OF PRESENT ILLNESS
[Has the patient missed work because of the injury/illness?] : The patient has missed work because of the injury/illness. [No] : The patient is currently not working. [FreeTextEntry1] : Patient is here today for re evaluation on her low back and sacroiliac joint pain. Patient is going for physical therapy 2 times a week going to physiatrist. Patient states still unable to ambulate without her walker. Pain level is a 7-8 on a daily basis. Worse is walking prolong sitting and standing better is resting and using walker.  - Summary : Ms. Hatfield presented for a follow-up visit regarding her ongoing low back pain and sacroiliac joint dysfunction related to a work-related injury sustained on January 12th, 2024. She previously underwent a left sacroiliac joint injection on April 19th, 2024, which provided significant relief. - Chief Complaint (CC) : Follow-up for low back pain and sacroiliac joint dysfunction. - History of Present Illness (HPI) : Chief Complaint: Follow-up for low back pain and sacroiliac joint dysfunction. Onset of Symptoms: January 12th, 2024 (work-related injury) Characterization of the Discomfort: Intermittent pain in the low back and waist area, exacerbated by sitting. Duration: Ongoing since January 12th, 2024 Location: Low back, waist area Severity: Intermittent, not constant Aggravating Factors: Sitting, particularly after dinner Relieving Factors: Left sacroiliac joint injection provided significant relief Associated Signs and Symptoms: null Temporal Factors: Pain has been occurring more frequently, both during the day and at night Context: Work-related injury on January 12th, 2024 Associated Symptoms: null Severity and Impact: Intermittent, not constant, but causing discomfort and concern Comparative Analysis: Left sacroiliac joint injection provided significant relief Patient's Medical Regimen: Currently taking gabapentin, but has not started the increased dose of 600 mg at night due to prescription issues Contextual Factors: Workers' compensation case,  involved, and independent medical evaluation ([employer]) conducted Recent Medical Interventions: Left sacroiliac joint injection on April 19th, 2024 [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

## 2024-06-11 NOTE — REASON FOR VISIT
[Follow-Up Visit] : a follow-up visit for [Workers' Comp: Date of Injury: _______] : This visit is related to worker's compensation. Date of Injury: [unfilled] [Back Pain] : back pain [FreeTextEntry2] : Left sacroiliac joint pain

## 2024-06-11 NOTE — DISCUSSION/SUMMARY
[Medication Risks Reviewed] : Medication risks reviewed [de-identified] : Assessment: - Summary : Ms. Hatfield is a patient with ongoing low back pain and sacroiliac joint dysfunction following a work-related injury on January 12th, 2024. She has previously undergone a left sacroiliac joint injection, which provided significant relief. However, her symptoms have been recurring more frequently, particularly in the low back and waist area. An MRI from March 8th, 2024, revealed disc bulging at L3-L4 and a previous fusion from L4 to S1. - Problems : - Low back pain  - Sacroiliac joint dysfunction  - Disc bulging at L3-L4  - Differential Diagnosis : - Radiculopathy from disc bulging at L3-L4  - Sacroiliac joint dysfunction  - Residual pain from previous lumbar fusion  Plan: - Summary : The plan is to proceed with a right sacroiliac joint injection, as the previous left-sided injection provided significant relief. Additionally, Ms. Hatfield will continue to follow up with the physiatrist for ongoing management of her condition, including medication adjustments, physical therapy, and work status evaluation. If the right sacroiliac joint injection is not approved, an epidural steroid injection at L3-L4 may be considered as an alternative treatment option. - Plan : - Submit request for right sacroiliac joint injection  - Provide work excuse note for an additional month  - Refer to physiatrist for ongoing management, including medication adjustments, physical therapy, and work status evaluation  - Consider epidural steroid injection at L3-L4 if right sacroiliac joint injection is not approved

## 2024-08-14 ENCOUNTER — TRANSCRIPTION ENCOUNTER (OUTPATIENT)
Age: 69
End: 2024-08-14

## 2024-08-16 ENCOUNTER — APPOINTMENT (OUTPATIENT)
Dept: ORTHOPEDIC SURGERY | Facility: HOSPITAL | Age: 69
End: 2024-08-16

## 2024-09-04 ENCOUNTER — APPOINTMENT (OUTPATIENT)
Dept: ORTHOPEDIC SURGERY | Facility: CLINIC | Age: 69
End: 2024-09-04
Payer: OTHER MISCELLANEOUS

## 2024-09-04 DIAGNOSIS — M48.062 SPINAL STENOSIS, LUMBAR REGION WITH NEUROGENIC CLAUDICATION: ICD-10-CM

## 2024-09-04 DIAGNOSIS — Z98.1 ARTHRODESIS STATUS: ICD-10-CM

## 2024-09-04 DIAGNOSIS — M54.16 RADICULOPATHY, LUMBAR REGION: ICD-10-CM

## 2024-09-04 DIAGNOSIS — M51.37 OTHER INTERVERTEBRAL DISC DEGENERATION, LUMBOSACRAL REGION: ICD-10-CM

## 2024-09-04 PROCEDURE — 99214 OFFICE O/P EST MOD 30 MIN: CPT

## 2024-09-04 NOTE — HISTORY OF PRESENT ILLNESS
[FreeTextEntry1] : Patient here today for a follow up after her sacroiliac join injection 8/16/24. She states she feels about 25% improvement in her low back pain but the radiating pain into her buttock is becoming more frequent. She states the pain is worse with prolonged standing, prolonged sitting and waling and feels better with position changes. She is walking with a walker.  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 [No] : The patient is currently not working. [FreeTextEntry6] : 1/31/24

## 2024-09-04 NOTE — PHYSICAL EXAM
[Stooped] : stooped [Walker] : ambulates with walker [SLR] : negative straight leg raise [LE] : Sensory: Intact in bilateral lower extremities [0] : left ankle jerk 0 [Plantar Reflex Right Only] : absent on the right [Plantar Reflex Left Only] : absent on the left [DTR Reflexes Clonus Of Right Ankle (___ Beats)] : absent on the right [DTR Reflexes Clonus Of Left Ankle (___ Beats)] : absent on the left [Obese] : obese [de-identified] : healed lumbar incision healed left ankle incisions SI tenderness R > L

## 2024-09-04 NOTE — REASON FOR VISIT
[Workers' Comp: Date of Injury: _______] : This visit is related to worker's compensation. Date of Injury: [unfilled] [FreeTextEntry2] : sacroiliac joint pain, injection

## 2024-09-04 NOTE — PHYSICAL EXAM
[Stooped] : stooped [Walker] : ambulates with walker [SLR] : negative straight leg raise [LE] : Sensory: Intact in bilateral lower extremities [0] : left ankle jerk 0 [Plantar Reflex Right Only] : absent on the right [Plantar Reflex Left Only] : absent on the left [DTR Reflexes Clonus Of Right Ankle (___ Beats)] : absent on the right [DTR Reflexes Clonus Of Left Ankle (___ Beats)] : absent on the left [Obese] : obese [de-identified] : healed lumbar incision healed left ankle incisions SI tenderness R > L

## 2024-12-04 ENCOUNTER — APPOINTMENT (OUTPATIENT)
Dept: ORTHOPEDIC SURGERY | Facility: CLINIC | Age: 69
End: 2024-12-04
Payer: OTHER MISCELLANEOUS

## 2024-12-04 DIAGNOSIS — Z98.1 ARTHRODESIS STATUS: ICD-10-CM

## 2024-12-04 DIAGNOSIS — M51.379 OTH INTVRT DISC DEGEN, LUMBOSACR W/O LUM BCK OR LW EXTRM PN: ICD-10-CM

## 2024-12-04 DIAGNOSIS — M54.16 RADICULOPATHY, LUMBAR REGION: ICD-10-CM

## 2024-12-04 PROCEDURE — 99214 OFFICE O/P EST MOD 30 MIN: CPT

## 2024-12-30 NOTE — ASSESSMENT
[FreeTextEntry1] : h/o hyperparathyroidism and sleep apnea - uses CPAP, sialoadenitis\par left parotid tender\par dry oral mucosa\par \par US parotid 12/9/2020: normal\par \par Plan:\par Reviewed imaging results. MRI parotid. FU after test. 
No

## 2025-02-15 ENCOUNTER — NON-APPOINTMENT (OUTPATIENT)
Age: 70
End: 2025-02-15

## 2025-03-05 ENCOUNTER — APPOINTMENT (OUTPATIENT)
Dept: ORTHOPEDIC SURGERY | Facility: CLINIC | Age: 70
End: 2025-03-05
Payer: OTHER MISCELLANEOUS

## 2025-03-05 DIAGNOSIS — Z98.1 ARTHRODESIS STATUS: ICD-10-CM

## 2025-03-05 PROCEDURE — 72170 X-RAY EXAM OF PELVIS: CPT

## 2025-03-05 PROCEDURE — 72110 X-RAY EXAM L-2 SPINE 4/>VWS: CPT

## 2025-03-05 PROCEDURE — 99213 OFFICE O/P EST LOW 20 MIN: CPT

## 2025-05-07 NOTE — H&P PST ADULT - HEIGHT IN INCHES
[facility]  ELECTROPHYSIOLOGY Follow up Visit    05/14/25    Warren Neff  1996    LAST VISIT: Visit date not found     Chief complaint:   Chief Complaint   Patient presents with   • Palpitations         HISTORY OF PRESENT ILLNESS     Warren Neff is being seen in consultation at the request of Dr. Stark for an opinion regarding evaluation and further management of palpitations, on Atenolol 25 mg daily.  Also a transfer of care from Dr Arriaga (last seen 2/9/2023).  The patient is a 28 year old male who is normotensive, non diabetic,  smoker with no known structural heart disease.  He has a history of anxiety, migraines, past cocaine use, ETOH abuse and tobacco use.   His palpitations date back to 7/27/2022 when he went to the ED for CP and palpitations following cocaine use. EKGs showed nonspecific ST and T wave changes with CP, resolved when CP went away.  Thought to be due to vasospasm from cocaine. At an OV with PCP 1/3/2023, he reported no additional cocaine use, but palpitations continued intermittently. He was referred to EP.   1/5/2023 he saw Dr Arriaga who ordered a 3 week monitor and Echo  1/26/2023 21 day event monitor showed SR, , avg HR 80 bpm, 0% PVCs, <1% PACs, 6 pt triggered episodes for CP, dizziness, skipped beats and SOB all correlated to SR or ST  1/31/2023 Echo EF 54%  2/9/2023 OV with Dr Arriaga general measures discussed.  PRN follow up  4/24/2023 Propanolol 20 mg bid started by PCP  6/20/2023 BB switched to Atenolol 25 mg daily by PCP  12/26/2024 the patient saw Dr Stark for heart racing at night keeping him awake.  He reported he quit smoking, now vapes occasionally.  He had also cut back on ETOH (2 cases of beer/week, down to 2-6 beers/week) due to the palpitations.  He now takes THC tincture to replace the ETOH. Labs and a 4 week monitor were ordered along with referral back to EP.  Labs and monitor never done.          PAST MEDICAL, FAMILY AND SOCIAL  HISTORY     Medications:  Current Outpatient Medications   Medication Sig Dispense Refill   • sildenafil (VIAGRA) 50 MG tablet Take 1 tablet by mouth daily as needed for Erectile Dysfunction. (Patient not taking: Reported on 5/14/2025) 10 tablet 11   • buPROPion XL (WELLBUTRIN XL) 300 MG 24 hr tablet Take 1 tablet by mouth daily. (Patient not taking: Reported on 5/14/2025) 30 tablet 3   • buPROPion (WELLBUTRIN SR) 100 MG 12 hr tablet One p.o. daily if needed for worsening depression symptoms (Patient not taking: Reported on 5/14/2025) 30 tablet 3   • atenolol (TENORMIN) 25 MG tablet Take 1 tablet by mouth daily. (Patient not taking: Reported on 5/14/2025) 30 tablet 3   • pantoprazole (PROTONIX) 40 MG tablet Take 1 tablet by mouth daily. (Patient not taking: Reported on 5/14/2025) 30 tablet 3   • escitalopram (LEXAPRO) 20 MG tablet Take 1 tablet by mouth daily. (Patient not taking: Reported on 5/14/2025) 30 tablet 5   • hydrOXYzine (ATARAX) 50 MG tablet Take 1 tablet by mouth nightly. (Patient not taking: Reported on 5/14/2025) 30 tablet 1   • Omega-3 Fatty Acids (FISH OIL OMEGA-3 PO) Take by mouth daily.       No current facility-administered medications for this visit.       Allergies:  ALLERGIES:   Allergen Reactions   • Bee Venom HIVES       Past Medical  History/Surgeries:  Past Medical History:   Diagnosis Date   • Anxiety disorder 10/4/2023   • Heart palpitations 3/23/2023       No past surgical history on file.    Family History:  Family History   Problem Relation Age of Onset   • Sleep Apnea Mother    • Dementia/Alzheimers Father    • Anxiety disorder Sister    • Depression Sister        Social History:  Social History     Tobacco Use   • Smoking status: Every Day     Current packs/day: 1.00     Average packs/day: 1 pack/day for 11.8 years (11.8 ttl pk-yrs)     Types: Cigarettes     Start date: 7/17/2013   • Smokeless tobacco: Never   • Tobacco comments:     Pt vapes currently   Substance Use Topics   •  Alcohol use: Not Currently     Alcohol/week: 56.0 standard drinks of alcohol     Types: 56 Cans of beer per week       REVIEW OF SYSTEMS     A complete review of systems was performed, and aside from what is mentioned in HPI, was negative.     PHYSICAL EXAM     Physical Exam:  Visit Vitals  /68   Pulse 75   Ht 5' 7\" (1.702 m)   Wt 68 kg (150 lb)   BMI 23.49 kg/m²      General: In no acute distress, normal body habitus, well developed.  Head/Eyes: EOMI (extraocular movements intact)  Cardiovascular system: RRR  Respiratory: Anterior/posterior: Non labored breathing.  Psychiatric: Alert, oriented to time, place, and person. Mood and affect appropriate.  Skin: Warm and dry. No rashes.  Neurological: Moves all extremities.     LABORATORY DATA     WBC (K/mcL)   Date Value   04/01/2023 11.8 (H)   01/03/2023 7.2   07/27/2022 9.2     HGB (g/dL)   Date Value   04/01/2023 14.8   01/03/2023 15.4   07/27/2022 14.6     PLT (K/mcL)   Date Value   04/01/2023 288   01/03/2023 278   07/27/2022 262     Potassium (mmol/L)   Date Value   04/01/2023 3.9     Creatinine (mg/dL)   Date Value   04/01/2023 0.90     GOT/AST (Units/L)   Date Value   01/03/2023 15     GPT/ALT (Units/L)   Date Value   01/03/2023 26     TSH (mcUnits/mL)   Date Value   01/03/2023 0.748         DIAGNOSTIC TESTING     EKG  Results for orders placed or performed in visit on 05/14/25   Electrocardiogram 12-Lead   Result Value Ref Range    Ventricular Rate EKG/Min (BPM) 75     Atrial Rate (BPM) 75     IN-Interval (MSEC) 142     QRS-Interval (MSEC) 96     QT-Interval (MSEC) 366     QTc 409     P Axis (Degrees) 69     R Axis (Degrees) 88     T Axis (Degrees) 61     REPORT TEXT       Normal sinus rhythm  Normal ECG  When compared with ECG of  01-APR-2023 14:48,  No significant change was found  Confirmed by ELIZABETH GUNTER, CLAUDIA ([id number]) on 5/14/2025 11:04:07 AM        Rhythm Strip 1/5/2023        21 day event monitor 1/26/2023  SR, , avg HR 80 bpm, 0% PVCs, <1% PACs,  6 pt triggered episodes for CP, dizziness, skipped beats and SOB all correlated to SR or ST    Echo 1/31/2023  Normal transthoracic echocardiogram  LVEF 54%    Stress Testing  None    Telemetry   (personally reviewed and interpreted by Dr. Rodriguez)    ASSESSMENT/PLAN       On 5/14/2025, I,  , RN scribed the services personally performed by Dr Rodriguez             2

## 2025-05-16 NOTE — ADDENDUM
[FreeTextEntry1] : Documented by Isabel Ann acting as scribe for Dr. Trevizo on 04/19/2021.\par \par All Medical record entries made by the Scribe were at my, Dr. Trevizo, direction and personally dictated by me on 04/19/2021 . I have reviewed the chart and agree that the record accurately reflects my personal performance of the history, physical exam, assessment and plan. I have also personally directed, reviewed, and agreed with the discharge instructions. 
No

## 2025-06-09 ENCOUNTER — APPOINTMENT (OUTPATIENT)
Dept: ORTHOPEDIC SURGERY | Facility: CLINIC | Age: 70
End: 2025-06-09
Payer: OTHER MISCELLANEOUS

## 2025-06-09 PROCEDURE — 99214 OFFICE O/P EST MOD 30 MIN: CPT

## (undated) DEVICE — SUT POLYSORB 1 27" GS-12 UNDYED

## (undated) DEVICE — PREP DURAPREP 26CC

## (undated) DEVICE — VENODYNE/SCD SLEEVE CALF MEDIUM

## (undated) DEVICE — GLV 8.5 PROTEXIS (WHITE)

## (undated) DEVICE — PACK IV START WITH CHG

## (undated) DEVICE — NDL SPINAL 18G X 3.5" (PINK)

## (undated) DEVICE — SYM-CONSIGN ZIMMER SEQUOIA CORE INST A: Type: DURABLE MEDICAL EQUIPMENT

## (undated) DEVICE — CATH IV SAFE INSYTE 24G X 3/4" (YELLOW)

## (undated) DEVICE — PACK SPINE

## (undated) DEVICE — NDL HYPO SAFE 22G X 1.5" (BLACK)

## (undated) DEVICE — GLV 7.5 PROTEXIS (WHITE)

## (undated) DEVICE — NDL SPINAL 22G X 3.5" QUINCKE

## (undated) DEVICE — SYR LUER LOK 10CC

## (undated) DEVICE — DRAPE MICROSCOPE LEICA 26" X 150"

## (undated) DEVICE — ELCTR BOVIE TIP BLADE INSULATED 4" EDGE

## (undated) DEVICE — WARMING BLANKET LOWER ADULT

## (undated) DEVICE — CONTAINER SPECIMEN 4OZ

## (undated) DEVICE — TRAY EPIDURAL SINGLE DOSE

## (undated) DEVICE — DRAPE MAYO STAND 30"

## (undated) DEVICE — CATH IV SAFE BC 22G X 1" (BLUE)

## (undated) DEVICE — DRAPE C ARM UNIVERSAL

## (undated) DEVICE — GLV 8 PROTEXIS (WHITE)

## (undated) DEVICE — GLV 7.5 ULTRAFREE MAX

## (undated) DEVICE — WARMING BLANKET UPPER ADULT

## (undated) DEVICE — SUT MONOSOF 2-0 18" C-15

## (undated) DEVICE — DRAPE 3/4 SHEET 52X76"

## (undated) DEVICE — CATH ANGIOCATH 12G

## (undated) DEVICE — POSITIONER JACKSON TABLE HEADREST 7", CHEST, HIP, THIGH PADS

## (undated) DEVICE — DRAPE COVER TABLE 44X75"

## (undated) DEVICE — SUT MONOCRYL 3-0 18" PS-2 UNDYED

## (undated) DEVICE — SOL INJ LR 500ML

## (undated) DEVICE — BASIN SET MINOR SINGLE

## (undated) DEVICE — STAPLER COVIDIEN SKIN APPOSE 35

## (undated) DEVICE — DRAPE BACK TABLE COVER BIG CASE

## (undated) DEVICE — VENODYNE/SCD SLEEVE CALF LARGE

## (undated) DEVICE — DRAIN JACKSON PRATT 10FR ROUND

## (undated) DEVICE — SYM-STRYKER THE MILL: Type: DURABLE MEDICAL EQUIPMENT

## (undated) DEVICE — DRSG CURITY GAUZE SPONGE 4 X 4" 12-PLY

## (undated) DEVICE — SYM-CONSIGN ZIMMER SEQUOIA IMPLANTS: Type: DURABLE MEDICAL EQUIPMENT

## (undated) DEVICE — SOL TOPICAL POVIDONE IODINE PVP-1